# Patient Record
Sex: FEMALE | Race: WHITE | NOT HISPANIC OR LATINO | Employment: OTHER | URBAN - METROPOLITAN AREA
[De-identification: names, ages, dates, MRNs, and addresses within clinical notes are randomized per-mention and may not be internally consistent; named-entity substitution may affect disease eponyms.]

---

## 2017-09-11 ENCOUNTER — OFFICE VISIT (OUTPATIENT)
Dept: HEMATOLOGY/ONCOLOGY | Facility: CLINIC | Age: 72
End: 2017-09-11
Payer: COMMERCIAL

## 2017-09-11 VITALS
BODY MASS INDEX: 23.46 KG/M2 | DIASTOLIC BLOOD PRESSURE: 68 MMHG | HEIGHT: 66 IN | RESPIRATION RATE: 18 BRPM | SYSTOLIC BLOOD PRESSURE: 158 MMHG | TEMPERATURE: 98 F | WEIGHT: 146 LBS | HEART RATE: 74 BPM

## 2017-09-11 DIAGNOSIS — I10 ESSENTIAL HYPERTENSION: ICD-10-CM

## 2017-09-11 DIAGNOSIS — E78.00 HIGH CHOLESTEROL: ICD-10-CM

## 2017-09-11 DIAGNOSIS — G20.A1 PARKINSON'S DISEASE: Primary | ICD-10-CM

## 2017-09-11 DIAGNOSIS — G20.A1 PARKINSON DISEASE: ICD-10-CM

## 2017-09-11 DIAGNOSIS — E83.110 HEREDITARY HEMOCHROMATOSIS: ICD-10-CM

## 2017-09-11 PROBLEM — E83.119 HEMOCHROMATOSIS: Status: ACTIVE | Noted: 2017-09-11

## 2017-09-11 PROCEDURE — 1159F MED LIST DOCD IN RCRD: CPT | Mod: ,,, | Performed by: INTERNAL MEDICINE

## 2017-09-11 PROCEDURE — 1126F AMNT PAIN NOTED NONE PRSNT: CPT | Mod: ,,, | Performed by: INTERNAL MEDICINE

## 2017-09-11 PROCEDURE — 99214 OFFICE O/P EST MOD 30 MIN: CPT | Mod: ,,, | Performed by: INTERNAL MEDICINE

## 2017-09-11 PROCEDURE — 3008F BODY MASS INDEX DOCD: CPT | Mod: ,,, | Performed by: INTERNAL MEDICINE

## 2017-09-11 RX ORDER — BIMATOPROST 0.3 MG/ML
1 SOLUTION/ DROPS OPHTHALMIC NIGHTLY
COMMUNITY
End: 2020-11-02

## 2017-09-11 RX ORDER — PRAVASTATIN SODIUM 10 MG/1
10 TABLET ORAL DAILY
COMMUNITY
End: 2019-09-16

## 2017-09-11 RX ORDER — LITHIUM CARBONATE 150 MG/1
150 CAPSULE ORAL 2 TIMES DAILY
COMMUNITY
End: 2022-04-20 | Stop reason: SDUPTHER

## 2017-09-11 RX ORDER — AA/PROT/LYSINE/METHIO/VIT C/B6 50-12.5 MG
10 TABLET ORAL DAILY
COMMUNITY
End: 2021-05-03

## 2017-09-11 RX ORDER — CARBIDOPA AND LEVODOPA 10; 100 MG/1; MG/1
1 TABLET ORAL 2 TIMES DAILY
Qty: 60 TABLET | Refills: 2
Start: 2017-09-11 | End: 2020-11-02

## 2017-09-11 RX ORDER — CARVEDILOL 12.5 MG/1
12.5 TABLET ORAL 2 TIMES DAILY WITH MEALS
COMMUNITY
End: 2017-09-11 | Stop reason: CLARIF

## 2017-09-11 RX ORDER — FLUOXETINE 10 MG/1
10 CAPSULE ORAL DAILY
COMMUNITY
End: 2020-04-17 | Stop reason: SDUPTHER

## 2017-09-11 RX ORDER — LISINOPRIL 5 MG/1
5 TABLET ORAL DAILY
COMMUNITY
End: 2020-01-08

## 2017-09-11 NOTE — LETTER
September 11, 2017      Max Landeros MD  [address]  [address]  [facility] [address]           [facility]  [address] [facility] [address]  [address]  [facility] [address]  Phone: [phone]  Fax: [phone]          Patient: Payton Gutierrez   MR Number: [id number]   YOB: 1945   Date of Visit: 9/11/2017       Dear Dr. Max Landeros:    Thank you for referring Payton Gutierrez to me for evaluation. Attached you will find relevant portions of my assessment and plan of care.    If you have questions, please do not hesitate to call me. I look forward to following Payton Gutierrez along with you.    Sincerely,    BRANDON Nava MD    Enclosure  CC:  No Recipients    If you would like to receive this communication electronically, please contact [email or web address] or [phone] to request more information on [facility] access.    For providers and/or their staff who would like to refer a patient to [facility], please contact us through our one-stop-shop provider referral line, [facility], at [phone].    If you feel you have received this communication in error or would no longer like to receive these types of communications, please e-mail [email or web address]

## 2017-09-11 NOTE — PROGRESS NOTES
[facility] History & Physical    Subjective:      Patient ID:   Payton Gutierrez  71 y.o. female  1945  [place]      Chief Complaint:   Iron discorder follow up    HPI:  71 y.o. female with diagnosis of increased arm stores, hemochromatosis treated with intermittent phlebotomy in the past. Last ferritin was 31, current ferritin is 52, phlebotomy is on hold.    She has history of GERD, glaucoma, hypercholesterolemia,hypertension, and has been diagnosed with Parkinson's disease.She sees Dr. Spencer.    She is status post tonsillectomy, appendectomy, complete hysterectomy.    She is a retired , she is allergic to codeine. She does not smoke, she does not drink alcohol with regularity.    Her dad passed away of a motor vehicle accident and her mother  of vaginal cancer. She has a brother with the hemochromatosis gene and increased ferritin on intermittent phlebotomy.     She denies gallbladder disease jaundice or hepatitis.      ROS:   GEN: normal without any fever, night sweats or weight loss  HEENT: normal with no HA's, sore throat, stiff neck, changes in vision  CV: normal with no CP, SOB, PND, SAUNDERS or orthopnea  PULM: normal with no SOB, cough, hemoptysis, sputum or pleuritic pain  GI: normal with no abdominal pain, nausea, vomiting, constipation, diarrhea, melanotic stools, BRBPR, or hematemesis  : normal with no hematuria, dysuria  BREAST: normal with no mass, discharge, pain  SKIN: normal with no rash, erythema, bruising, or swelling     Past Medical History:   Diagnosis Date    Glaucoma      Past Surgical History:   Procedure Laterality Date    CATARACT EXTRACTION  2016    HYSTERECTOMY         Review of patient's allergies indicates:   Allergen Reactions    Codeine     Sulfa (sulfonamide antibiotics)      Social History     Social History    Marital status: Single     Spouse name: N/A    Number of children: N/A    Years of education: N/A     Occupational History    Not on file.     Social  "History Main Topics    Smoking status: Never Smoker    Smokeless tobacco: Never Used    Alcohol use No    Drug use: No    Sexual activity: Not on file     Other Topics Concern    Not on file     Social History Narrative    No narrative on file         Current Outpatient Prescriptions:     bimatoprost (LUMIGAN) 0.03 % ophthalmic drops, 1 drop every evening., Disp: , Rfl:     coenzyme Q10 (CO Q-10) 10 mg capsule, Take 10 mg by mouth once daily., Disp: , Rfl:     fluoxetine (PROZAC) 10 MG capsule, Take 10 mg by mouth once daily., Disp: , Rfl:     lisinopril (PRINIVIL,ZESTRIL) 5 MG tablet, Take 5 mg by mouth once daily., Disp: , Rfl:     lithium carbonate 150 MG capsule, Take 150 mg by mouth 3 (three) times daily with meals., Disp: , Rfl:     pravastatin (PRAVACHOL) 10 MG tablet, Take 10 mg by mouth once daily., Disp: , Rfl:     carbidopa-levodopa  mg (SINEMET)  mg per tablet, Take 1 tablet by mouth 2 (two) times daily. Pt not sure of pill strength, bring in bottle next time, Disp: 60 tablet, Rfl: 2    travoprost, benzalkonium, (TRAVATAN) 0.004 % ophthalmic solution, 1 drop every evening.  , Disp: , Rfl:           Objective:   Vitals:  Blood pressure (!) 158/68, pulse 74, temperature 98.2 °F (36.8 °C), resp. rate 18, height 5' 6" (1.676 m), weight 66.2 kg (146 lb).    Physical Examination:   GEN: no apparent distress, comfortable  HEAD: atraumatic and normocephalic  EYES: no pallor, no icterus  ENT: OMM, no pharyngeal erythema, external ears WNL; no nasal discharge; no thrush  NECK: no masses, thyroid normal, trachea midline, no LAD/LN's, supple  CV: RRR with no murmur; normal pulse; normal S1 and S2; no pedal edema  CHEST: Normal respiratory effort; CTAB; normal breath sounds; no wheeze or crackles  ABDOM: nontender and nondistended; soft; normal bowel sounds; no rebound/guarding, the liver and spleen are not palpable  MUSC/Skeletal: ROM normal; no crepitus; joints normal  EXTREM: no " clubbing, cyanosis, inflammation or swelling  SKIN: no rashes, lesions, ulcers, petechia  : no maradiaga  NEURO: grossly intact; motor/sensory WNL;  no tremors  PSYCH: normal mood, affect and behavior  LYMPH: normal cervical, supraclavicular, axillary and groin LN's  BREASTS:she left the bra on, I did not examine the breasts    Radiology/Diagnostic Studies:    Lab from 09/05/2017 showed a hemoglobin of 12.9, and a ferritin of 52.    Dr. Jain did colonoscopy on August 17, diverticular disease were found, biopsy was negative for colitis.      Assessment:   (1) 71 y.o. female with diagnosis of increased iron store worse, or hemochromatosis. She is status post intermittent phlebotomy in the past. Ferritin level is normal at 52 and relatively stable. Observation is the plan. She will have follow-up CBC, and ferritin and return to clinic in 6 months time    (2)other history includes hypertension, hypercholesterolemia, she will follow-up with Dr. Landeros for primary care. She has recently been diagnosed with Parkinson's disease and will follow-up with Dr. Spencer for that condition.    Return to clinic here in 6 months with CBC and ferritin.          1. Parkinson's disease    2. Parkinson disease    3. Essential hypertension    4. High cholesterol    5. Hereditary hemochromatosis        Plan:     Return in about 6 months (around 3/11/2018) for check of blood status after therapy.

## 2018-02-26 LAB
BASOPHILS ABSOLUTE COUNT: 0.1 K/UL (ref 0–0.2)
BASOPHILS NFR BLD: 1.3 % (ref 0–2)
DIFFERENTIAL TYPE: NORMAL
EOSINOPHIL NFR BLD: 3.9 % (ref 0–4)
EOSINOPHILS ABSOLUTE COUNT: 0.2 K/UL (ref 0–0.7)
ERYTHROCYTE [DISTWIDTH] IN BLOOD BY AUTOMATED COUNT: 14 GRAM/DL (ref 12–15.3)
FERRITIN SERPL-MCNC: 99.2 NANOGRAM/ML (ref 13–150)
HCT VFR BLD AUTO: 40.9 % (ref 37–47)
HGB BLD-MCNC: 14.2 GRAM/DL (ref 12–16)
LYMPHOCYTES %: 31.5 % (ref 15–45)
LYMPHOCYTES ABSOLUTE COUNT: 1.5 K/UL (ref 1–4.2)
MCH RBC QN AUTO: 30.5 PICOGRAM (ref 27–33)
MCHC RBC AUTO-ENTMCNC: 34.7 GRAM/DL (ref 32–36)
MCV RBC AUTO: 88.1 FEMTOLITER (ref 81–99)
MONOCYTES %: 9.2 % (ref 3–13)
MONOCYTES ABSOLUTE COUNT: 0.4 K/UL (ref 0.1–0.8)
NEUTROPHILS ABSOLUTE COUNT: 2.5 K/UL (ref 2.1–7.6)
NEUTROPHILS RELATIVE PERCENT: 54.1 % (ref 32–80)
PLATELET # BLD AUTO: 280 K/UL (ref 150–350)
PMV BLD AUTO: 8.1 FEMTOLITER (ref 7–10.2)
RBC # BLD AUTO: 4.64 MIL/UL (ref 4.2–5.4)
WBC # BLD AUTO: 4.6 K/UL (ref 4.5–11)

## 2018-03-05 ENCOUNTER — OFFICE VISIT (OUTPATIENT)
Dept: HEMATOLOGY/ONCOLOGY | Facility: CLINIC | Age: 73
End: 2018-03-05
Payer: COMMERCIAL

## 2018-03-05 VITALS
WEIGHT: 143 LBS | SYSTOLIC BLOOD PRESSURE: 172 MMHG | RESPIRATION RATE: 18 BRPM | BODY MASS INDEX: 23.08 KG/M2 | DIASTOLIC BLOOD PRESSURE: 74 MMHG | TEMPERATURE: 98 F | HEART RATE: 64 BPM

## 2018-03-05 DIAGNOSIS — E83.110 HEREDITARY HEMOCHROMATOSIS: Primary | ICD-10-CM

## 2018-03-05 PROCEDURE — 3078F DIAST BP <80 MM HG: CPT | Mod: ,,, | Performed by: INTERNAL MEDICINE

## 2018-03-05 PROCEDURE — 99214 OFFICE O/P EST MOD 30 MIN: CPT | Mod: ,,, | Performed by: INTERNAL MEDICINE

## 2018-03-05 PROCEDURE — 3077F SYST BP >= 140 MM HG: CPT | Mod: ,,, | Performed by: INTERNAL MEDICINE

## 2018-03-05 NOTE — LETTER
March 5, 2018      Max Landeros MD  [address]  [address]  [facility] [address]           [facility]  [address] [facility]  [address]  [facility] [address]  Phone: [phone]  Fax: [phone]          Patient: Payton Gutierrez   MR Number: [id number]   YOB: 1945   Date of Visit: 3/5/2018       Dear Dr. Max Landeros:    Thank you for referring Payton Gutierrez to me for evaluation. Attached you will find relevant portions of my assessment and plan of care.    If you have questions, please do not hesitate to call me. I look forward to following Payton Gutierrez along with you.    Sincerely,    BRANDON Nava MD    Enclosure  CC:  No Recipients    If you would like to receive this communication electronically, please contact [email or web address] or [phone] to request more information on [facility] access.    For providers and/or their staff who would like to refer a patient to [facility], please contact us through our one-stop-shop provider referral line, [facility], at [phone].    If you feel you have received this communication in error or would no longer like to receive these types of communications, please e-mail [email or web address]

## 2018-03-05 NOTE — PROGRESS NOTES
[facility] History & Physical    Subjective:      Patient ID:   Payton Gutierrez  72 y.o. female  1945  [place]      Chief Complaint:   Iron discorder follow up    HPI:  72 y.o. female with diagnosis of increased iron stores, hemochromatosis treated with intermittent phlebotomy in the past. Ferritin is 99, Hgb 14 now, phlebotomy is on hold.    She has history of GERD, glaucoma, hypercholesterolemia,hypertension, and has been diagnosed with Parkinson's disease.  She sees Dr. Spencer.    She is status post tonsillectomy, appendectomy, complete hysterectomy.    She is a retired , she is allergic to codeine. She does not smoke, she does not drink alcohol with regularity.    Her dad passed away of a motor vehicle accident and her mother  of vaginal cancer. She has a brother with the hemochromatosis gene and increased ferritin on intermittent phlebotomy.    She denies gallbladder disease jaundice or hepatitis.  She is on lithium.  She is seeing Dr. Watts for renal dx.  She recently had diverticulitis.    2nd cousin Adry STARKS.    ROS:   GEN: normal without any fever, night sweats or weight loss  HEENT: normal with no HA's, sore throat, stiff neck, changes in vision  CV: normal with no CP, SOB, PND, SAUNDERS or orthopnea  PULM: normal with no SOB, cough, hemoptysis, sputum or pleuritic pain  GI: normal with no abdominal pain, nausea, vomiting, constipation, diarrhea, melanotic stools, BRBPR, or hematemesis  : normal with no hematuria, dysuria  BREAST: normal with no mass, discharge, pain  SKIN: normal with no rash, erythema, bruising, or swelling     Past Medical History:   Diagnosis Date    Glaucoma      Past Surgical History:   Procedure Laterality Date    CATARACT EXTRACTION  2016    HYSTERECTOMY         Review of patient's allergies indicates:   Allergen Reactions    Codeine     Sulfa (sulfonamide antibiotics)      Social History     Social History    Marital status: Single     Spouse name: N/A     Number of children: N/A    Years of education: N/A     Occupational History    Not on file.     Social History Main Topics    Smoking status: Never Smoker    Smokeless tobacco: Never Used    Alcohol use No    Drug use: No    Sexual activity: Not on file     Other Topics Concern    Not on file     Social History Narrative    No narrative on file         Current Outpatient Prescriptions:     bimatoprost (LUMIGAN) 0.03 % ophthalmic drops, 1 drop every evening., Disp: , Rfl:     carbidopa-levodopa  mg (SINEMET)  mg per tablet, Take 1 tablet by mouth 2 (two) times daily. Pt not sure of pill strength, bring in bottle next time, Disp: 60 tablet, Rfl: 2    coenzyme Q10 (CO Q-10) 10 mg capsule, Take 10 mg by mouth once daily., Disp: , Rfl:     fluoxetine (PROZAC) 10 MG capsule, Take 10 mg by mouth once daily., Disp: , Rfl:     lisinopril (PRINIVIL,ZESTRIL) 5 MG tablet, Take 5 mg by mouth once daily., Disp: , Rfl:     lithium carbonate 150 MG capsule, Take 150 mg by mouth 3 (three) times daily with meals., Disp: , Rfl:     pravastatin (PRAVACHOL) 10 MG tablet, Take 10 mg by mouth once daily., Disp: , Rfl:     travoprost, benzalkonium, (TRAVATAN) 0.004 % ophthalmic solution, 1 drop every evening.  , Disp: , Rfl:           Objective:   Vitals:  Blood pressure (!) 172/74, pulse 64, temperature 97.9 °F (36.6 °C), resp. rate 18, weight 64.9 kg (143 lb).    Physical Examination:   GEN: no apparent distress, comfortable  HEAD: atraumatic and normocephalic  EYES: no pallor, no icterus  ENT: OMM, no pharyngeal erythema, external ears WNL; no nasal discharge; no thrush  NECK: no masses, thyroid normal, trachea midline, no LAD/LN's, supple  CV: RRR with no murmur; normal pulse; normal S1 and S2; no pedal edema  CHEST: Normal respiratory effort; CTAB; normal breath sounds; no wheeze or crackles  ABDOM: nontender and nondistended; soft; normal bowel sounds; no rebound/guarding, the liver and spleen are not  palpable  MUSC/Skeletal: ROM normal; no crepitus; joints normal  EXTREM: no clubbing, cyanosis, inflammation or swelling  SKIN: no rashes, lesions, ulcers, petechia  : no maradiaga  NEURO: grossly intact; motor/sensory WNL;  no tremors  PSYCH: normal mood, affect and behavior  LYMPH: normal cervical, supraclavicular, axillary and groin LN's  BREASTS:she left the bra on, I did not examine the breasts    Radiology/Diagnostic Studies:    Ferritin 99, Hgb 14.    Dr. Jain did colonoscopy on August 17, diverticular disease were found, biopsy was negative for colitis.      Assessment:   (1) 72 y.o. female with diagnosis of increased iron stores, or hemochromatosis. She is status post intermittent phlebotomy in the past. Ferritin level is normal at 80-90  and relatively stable. Observation is the plan. She will have follow-up CBC, and ferritin and return to clinic in 6 months time.    (2)other history includes hypertension, hypercholesterolemia, she will follow-up with Dr. Landeros for primary care. She has recently been diagnosed with Parkinson's disease and will follow-up with Dr. Spencer for that condition and Dr. Chapa for renal.    Return to clinic here in 6 months with CBC and ferritin.

## 2018-09-04 LAB
BASOPHILS NFR BLD: 0.1 K/UL (ref 0–0.2)
BASOPHILS NFR BLD: 1 %
EOSINOPHIL NFR BLD: 0.3 K/UL (ref 0–0.7)
EOSINOPHIL NFR BLD: 4.4 %
ERYTHROCYTE [DISTWIDTH] IN BLOOD BY AUTOMATED COUNT: 12.7 % (ref 11.7–14.9)
FERRITIN SERPL-MCNC: 85 NG/ML (ref 24–162)
GRAN #: 3.3 K/UL (ref 1.4–6.5)
GRAN%: 54.1 %
HCT VFR BLD AUTO: 41.1 % (ref 36–48)
HGB BLD-MCNC: 13.8 G/DL (ref 12–15)
IMMATURE GRANS (ABS): 0 K/UL (ref 0–1)
IMMATURE GRANULOCYTES: 0.3 %
LYMPH #: 1.9 K/UL (ref 1.2–3.4)
LYMPH%: 30.4 %
MCH RBC QN AUTO: 31.4 PG (ref 25–35)
MCHC RBC AUTO-ENTMCNC: 33.6 G/DL (ref 31–36)
MCV RBC AUTO: 93.6 FL (ref 79–98)
MONO #: 0.6 K/UL (ref 0.1–0.6)
MONO%: 9.8 %
NUCLEATED RBCS: 0 %
PLATELET # BLD AUTO: 235 K/UL (ref 140–440)
PMV BLD AUTO: 10.4 FL (ref 8.8–12.7)
RBC # BLD AUTO: 4.39 M/UL (ref 3.5–5.5)
WBC # BLD AUTO: 6.1 K/UL (ref 5–10)

## 2018-09-10 ENCOUNTER — OFFICE VISIT (OUTPATIENT)
Dept: HEMATOLOGY/ONCOLOGY | Facility: CLINIC | Age: 73
End: 2018-09-10
Payer: COMMERCIAL

## 2018-09-10 VITALS
SYSTOLIC BLOOD PRESSURE: 168 MMHG | TEMPERATURE: 98 F | BODY MASS INDEX: 24.41 KG/M2 | WEIGHT: 151.88 LBS | HEIGHT: 66 IN | HEART RATE: 73 BPM | DIASTOLIC BLOOD PRESSURE: 76 MMHG

## 2018-09-10 DIAGNOSIS — E83.110 HEREDITARY HEMOCHROMATOSIS: Primary | ICD-10-CM

## 2018-09-10 PROCEDURE — 1101F PT FALLS ASSESS-DOCD LE1/YR: CPT | Mod: ,,, | Performed by: INTERNAL MEDICINE

## 2018-09-10 PROCEDURE — 3077F SYST BP >= 140 MM HG: CPT | Mod: ,,, | Performed by: INTERNAL MEDICINE

## 2018-09-10 PROCEDURE — 99214 OFFICE O/P EST MOD 30 MIN: CPT | Mod: ,,, | Performed by: INTERNAL MEDICINE

## 2018-09-10 PROCEDURE — 3078F DIAST BP <80 MM HG: CPT | Mod: ,,, | Performed by: INTERNAL MEDICINE

## 2018-09-10 NOTE — LETTER
September 10, 2018      Max Landeros MD  [address]  [address]  [facility]  [place] [address]           [facility] - Hematology Oncology  [address] [facility] [address]  [address]  [place] [address]  Phone: [phone]  Fax: [phone]          Patient: Payton Gutierrez   MR Number: [id number]   YOB: 1945   Date of Visit: 9/10/2018       Dear Dr. Max Landeros:    Thank you for referring Payton Gutierrez to me for evaluation. Attached you will find relevant portions of my assessment and plan of care.    If you have questions, please do not hesitate to call me. I look forward to following Payton Gutierrez along with you.    Sincerely,    BRANDON Nava MD    Enclosure  CC:  No Recipients    If you would like to receive this communication electronically, please contact [email or web address] or [phone] to request more information on [facility] access.    For providers and/or their staff who would like to refer a patient to [facility], please contact us through our one-stop-shop provider referral line, [facility] [place], at [phone].    If you feel you have received this communication in error or would no longer like to receive these types of communications, please e-mail [email or web address]

## 2018-09-10 NOTE — PROGRESS NOTES
[facility] History & Physical    Subjective:      Patient ID:   Payton Gutierrez  72 y.o. female  1945  [place]      Chief Complaint:   Iron discorder follow up    HPI:  72 y.o. female with diagnosis of increased iron stores, hemochromatosis treated with intermittent phlebotomy in the past. Ferritin is 99, Hgb 14 now, phlebotomy is on hold. Her last therapeutic phlebotomy was 1-1/2 year ago.    She has history of GERD, glaucoma, hypercholesterolemia,hypertension, and has been diagnosed with Parkinson's disease.  She sees Dr. Spencer.    She is status post tonsillectomy, appendectomy, complete hysterectomy.    She is a retired , she is allergic to codeine. She does not smoke, she does not drink alcohol with regularity.    Her dad passed away of a motor vehicle accident and her mother  of vaginal cancer. She has a brother with the hemochromatosis gene and increased ferritin on intermittent phlebotomy.    She denies gallbladder disease jaundice or hepatitis.  She is on lithium.  She is seeing Dr. Watts for renal dx.  She recently had diverticulitis.    2nd cousin Adry STARKS.    ROS:   GEN: normal without any fever, night sweats or weight loss  HEENT: normal with no HA's, sore throat, stiff neck, changes in vision  CV: normal with no CP, SOB, PND, SAUNDERS or orthopnea  PULM: normal with no SOB, cough, hemoptysis, sputum or pleuritic pain  GI: normal with no abdominal pain, nausea, vomiting, constipation, diarrhea, melanotic stools, BRBPR, or hematemesis  : normal with no hematuria, dysuria  BREAST: normal with no mass, discharge, pain  SKIN: normal with no rash, erythema, bruising, or swelling     Past Medical History:   Diagnosis Date    Glaucoma      Past Surgical History:   Procedure Laterality Date    CATARACT EXTRACTION  2016    HYSTERECTOMY         Review of patient's allergies indicates:   Allergen Reactions    Codeine     Sulfa (sulfonamide antibiotics)      Social History     Socioeconomic  "History    Marital status: Single     Spouse name: Not on file    Number of children: Not on file    Years of education: Not on file    Highest education level: Not on file   Social Needs    Financial resource strain: Not on file    Food insecurity - worry: Not on file    Food insecurity - inability: Not on file    Transportation needs - medical: Not on file    Transportation needs - non-medical: Not on file   Occupational History    Not on file   Tobacco Use    Smoking status: Never Smoker    Smokeless tobacco: Never Used   Substance and Sexual Activity    Alcohol use: No    Drug use: No    Sexual activity: Not on file   Other Topics Concern    Not on file   Social History Narrative    Not on file         Current Outpatient Medications:     bimatoprost (LUMIGAN) 0.03 % ophthalmic drops, 1 drop every evening., Disp: , Rfl:     carbidopa-levodopa  mg (SINEMET)  mg per tablet, Take 1 tablet by mouth 2 (two) times daily. Pt not sure of pill strength, bring in bottle next time, Disp: 60 tablet, Rfl: 2    coenzyme Q10 (CO Q-10) 10 mg capsule, Take 10 mg by mouth once daily., Disp: , Rfl:     fluoxetine (PROZAC) 10 MG capsule, Take 10 mg by mouth once daily., Disp: , Rfl:     lisinopril (PRINIVIL,ZESTRIL) 5 MG tablet, Take 5 mg by mouth once daily., Disp: , Rfl:     lithium carbonate 150 MG capsule, Take 150 mg by mouth 3 (three) times daily with meals., Disp: , Rfl:     pravastatin (PRAVACHOL) 10 MG tablet, Take 10 mg by mouth once daily., Disp: , Rfl:     travoprost, benzalkonium, (TRAVATAN) 0.004 % ophthalmic solution, 1 drop every evening.  , Disp: , Rfl:           Objective:   Vitals:  Blood pressure (!) 168/76, pulse 73, temperature 97.5 °F (36.4 °C), height 5' 6" (1.676 m), weight 68.9 kg (151 lb 14.4 oz).    Physical Examination:   GEN: no apparent distress, comfortable  HEAD: atraumatic and normocephalic  EYES: no pallor, no icterus  ENT: OMM, no pharyngeal erythema, external " ears WNL; no nasal discharge; no thrush  NECK: no masses, thyroid normal, trachea midline, no LAD/LN's, supple  CV: RRR with no murmur; normal pulse; normal S1 and S2; no pedal edema  CHEST: Normal respiratory effort; CTAB; normal breath sounds; no wheeze or crackles  ABDOM: nontender and nondistended; soft; normal bowel sounds; no rebound/guarding, the liver and spleen are not palpable  MUSC/Skeletal: ROM normal; no crepitus; joints normal  EXTREM: no clubbing, cyanosis, inflammation or swelling  SKIN: no rashes, lesions, ulcers, petechia  : no maradiaga  NEURO: grossly intact; motor/sensory WNL;  no tremors  PSYCH: normal mood, affect and behavior  LYMPH: normal cervical, supraclavicular, axillary and groin LN's  BREASTS:she left the bra on, I did not examine the breasts    Radiology/Diagnostic Studies:    Ferritin 85, Hgb 14.    Dr. Jain did colonoscopy on August 17, 2017, diverticular disease were found, biopsy was negative for colitis.      Assessment:   (1) 72 y.o. female with diagnosis of increased iron stores, or hemochromatosis. She is status post intermittent phlebotomy in the past. Ferritin level is normal at 80-90  and relatively stable. Observation is the plan. She will have follow-up CBC, and ferritin and return to clinic in 6 months time.    (2)other history includes hypertension, hypercholesterolemia, she will follow-up with Dr. Landeros for primary care. She has recently been diagnosed with Parkinson's disease and will follow-up with Dr. Spencer for that condition and Dr. Chapa for renal.    Return to clinic here in 6 months with CBC and ferritin.

## 2019-03-04 LAB
BASOPHILS NFR BLD: 0.1 K/UL (ref 0–0.2)
BASOPHILS NFR BLD: 1.2 %
EOSINOPHIL NFR BLD: 0.3 K/UL (ref 0–0.7)
EOSINOPHIL NFR BLD: 4.8 %
ERYTHROCYTE [DISTWIDTH] IN BLOOD BY AUTOMATED COUNT: 12.8 % (ref 11.7–14.9)
FERRITIN SERPL-MCNC: 107 NG/ML (ref 24–162)
GRAN #: 3.2 K/UL (ref 1.4–6.5)
GRAN%: 57 %
HCT VFR BLD AUTO: 42 % (ref 36–48)
HGB BLD-MCNC: 13.7 G/DL (ref 12–15)
IMMATURE GRANS (ABS): 0 K/UL (ref 0–1)
IMMATURE GRANULOCYTES: 0.2 %
LYMPH #: 1.6 K/UL (ref 1.2–3.4)
LYMPH%: 27.6 %
MCH RBC QN AUTO: 30.2 PG (ref 25–35)
MCHC RBC AUTO-ENTMCNC: 32.6 G/DL (ref 31–36)
MCV RBC AUTO: 92.5 FL (ref 79–98)
MONO #: 0.5 K/UL (ref 0.1–0.6)
MONO%: 9.2 %
NUCLEATED RBCS: 0 %
PLATELET # BLD AUTO: 263 K/UL (ref 140–440)
PMV BLD AUTO: 10.2 FL (ref 8.8–12.7)
RBC # BLD AUTO: 4.54 M/UL (ref 3.5–5.5)
WBC # BLD AUTO: 5.7 K/UL (ref 5–10)

## 2019-03-11 ENCOUNTER — OFFICE VISIT (OUTPATIENT)
Dept: HEMATOLOGY/ONCOLOGY | Facility: CLINIC | Age: 74
End: 2019-03-11
Payer: COMMERCIAL

## 2019-03-11 DIAGNOSIS — E83.119 HEMOCHROMATOSIS, UNSPECIFIED HEMOCHROMATOSIS TYPE: Primary | ICD-10-CM

## 2019-03-11 PROCEDURE — 99214 PR OFFICE/OUTPT VISIT, EST, LEVL IV, 30-39 MIN: ICD-10-PCS | Mod: ,,, | Performed by: INTERNAL MEDICINE

## 2019-03-11 PROCEDURE — 99214 OFFICE O/P EST MOD 30 MIN: CPT | Mod: ,,, | Performed by: INTERNAL MEDICINE

## 2019-03-12 NOTE — PROGRESS NOTES
[facility] History & Physical    Subjective:      Patient ID:   Payton Gutierrez  73 y.o. female  1945  [place]      Chief Complaint:   Iron discorder follow up    HPI:  73 y.o. female with diagnosis of increased iron stores, hemochromatosis treated with intermittent phlebotomy in the past. Ferritin is 107 , Hgb 14 now, phlebotomy is on hold. Her last therapeutic phlebotomy was 2 years  ago.    She has history of GERD, glaucoma, hypercholesterolemia,hypertension, and has been diagnosed with Parkinson's disease.  She sees Dr. Spencer.    She is status post tonsillectomy, appendectomy, complete hysterectomy.    She is a retired , she is allergic to codeine. She does not smoke, she does not drink alcohol with regularity.    Her dad passed away of a motor vehicle accident and her mother  of vaginal cancer. She has a brother with the hemochromatosis gene and increased ferritin on intermittent phlebotomy.    She denies gallbladder disease jaundice or hepatitis.  She is on lithium.  She is seeing Dr. Watts for renal dx.  She recently had diverticulitis.    2nd cousin Adry STARKS.    ROS:   GEN: normal without any fever, night sweats or weight loss  HEENT: normal with no HA's, sore throat, stiff neck, changes in vision  CV: normal with no CP, SOB, PND, SAUNDERS or orthopnea  PULM: normal with no SOB, cough, hemoptysis, sputum or pleuritic pain  GI: normal with no abdominal pain, nausea, vomiting, constipation, diarrhea, melanotic stools, BRBPR, or hematemesis  : normal with no hematuria, dysuria  BREAST: normal with no mass, discharge, pain  SKIN: normal with no rash, erythema, bruising, or swelling     Past Medical History:   Diagnosis Date    Glaucoma      Past Surgical History:   Procedure Laterality Date    CATARACT EXTRACTION  2016    HYSTERECTOMY         Review of patient's allergies indicates:   Allergen Reactions    Codeine     Sulfa (sulfonamide antibiotics)      Social History     Socioeconomic  History    Marital status: Single     Spouse name: Not on file    Number of children: Not on file    Years of education: Not on file    Highest education level: Not on file   Social Needs    Financial resource strain: Not on file    Food insecurity - worry: Not on file    Food insecurity - inability: Not on file    Transportation needs - medical: Not on file    Transportation needs - non-medical: Not on file   Occupational History    Not on file   Tobacco Use    Smoking status: Never Smoker    Smokeless tobacco: Never Used   Substance and Sexual Activity    Alcohol use: No    Drug use: No    Sexual activity: Not on file   Other Topics Concern    Not on file   Social History Narrative    Not on file         Current Outpatient Medications:     bimatoprost (LUMIGAN) 0.03 % ophthalmic drops, 1 drop every evening., Disp: , Rfl:     carbidopa-levodopa  mg (SINEMET)  mg per tablet, Take 1 tablet by mouth 2 (two) times daily. Pt not sure of pill strength, bring in bottle next time, Disp: 60 tablet, Rfl: 2    coenzyme Q10 (CO Q-10) 10 mg capsule, Take 10 mg by mouth once daily., Disp: , Rfl:     fluoxetine (PROZAC) 10 MG capsule, Take 10 mg by mouth once daily., Disp: , Rfl:     lisinopril (PRINIVIL,ZESTRIL) 5 MG tablet, Take 5 mg by mouth once daily., Disp: , Rfl:     lithium carbonate 150 MG capsule, Take 150 mg by mouth 3 (three) times daily with meals., Disp: , Rfl:     pravastatin (PRAVACHOL) 10 MG tablet, Take 10 mg by mouth once daily., Disp: , Rfl:     travoprost, benzalkonium, (TRAVATAN) 0.004 % ophthalmic solution, 1 drop every evening.  , Disp: , Rfl:           Objective:   Vitals:  There were no vitals taken for this visit.    Physical Examination:   GEN: no apparent distress, comfortable  HEAD: atraumatic and normocephalic  EYES: no pallor, no icterus  ENT: OMM, no pharyngeal erythema, external ears WNL; no nasal discharge; no thrush  NECK: no masses, thyroid normal, trachea  midline, no LAD/LN's, supple  CV: RRR with no murmur; normal pulse; normal S1 and S2; no pedal edema  CHEST: Normal respiratory effort; CTAB; normal breath sounds; no wheeze or crackles  ABDOM: nontender and nondistended; soft;  no rebound/guarding, the liver and spleen are not palpable  MUSC/Skeletal: ROM normal; no crepitus; joints normal  EXTREM: no clubbing, cyanosis, inflammation or swelling  SKIN: no rashes, lesions, ulcers, petechia  : no cvat  NEURO: grossly intact; motor/sensory WNL;  no tremors  PSYCH: normal mood, affect and behavior  LYMPH: normal cervical, supraclavicular, axillary and groin LN's  BREASTS:she left the bra on, I did not examine the breasts    Radiology/Diagnostic Studies:    Ferritin 107, Hgb 14.    Dr. Jain did colonoscopy on August 17, 2017, diverticular disease were found, biopsy was negative for colitis.      Assessment:   (1) 73 y.o. female with diagnosis of increased iron stores, or hemochromatosis. She is status post intermittent phlebotomy in the past. Ferritin level is normal at 107  and relatively stable. Observation is the plan. She will have follow-up CBC, and ferritin and return to clinic in 6 months time.    (2)other history includes hypertension, hypercholesterolemia, she will follow-up with Dr. Landeros for primary care. She has recently been diagnosed with Parkinson's disease and will follow-up with Dr. Spencer for that condition and Dr. Chapa for renal.    Return to clinic here in 6 months with CBC and ferritin.

## 2019-09-10 ENCOUNTER — LAB VISIT (OUTPATIENT)
Dept: LAB | Facility: HOSPITAL | Age: 74
End: 2019-09-10
Attending: INTERNAL MEDICINE
Payer: COMMERCIAL

## 2019-09-10 DIAGNOSIS — E83.119 DILATED CARDIOMYOPATHY SECONDARY TO HEMOCHROMATOSIS: Primary | ICD-10-CM

## 2019-09-10 DIAGNOSIS — I43 DILATED CARDIOMYOPATHY SECONDARY TO HEMOCHROMATOSIS: Primary | ICD-10-CM

## 2019-09-10 LAB
BASOPHILS # BLD AUTO: 0.07 K/UL (ref 0–0.2)
BASOPHILS NFR BLD: 1.5 % (ref 0–1.9)
DIFFERENTIAL METHOD: NORMAL
EOSINOPHIL # BLD AUTO: 0.2 K/UL (ref 0–0.5)
EOSINOPHIL NFR BLD: 4.2 % (ref 0–8)
ERYTHROCYTE [DISTWIDTH] IN BLOOD BY AUTOMATED COUNT: 12.8 % (ref 11.5–14.5)
FERRITIN SERPL-MCNC: 117 NG/ML (ref 20–300)
HCT VFR BLD AUTO: 40.1 % (ref 37–48.5)
HGB BLD-MCNC: 13.2 G/DL (ref 12–16)
IMM GRANULOCYTES # BLD AUTO: 0.01 K/UL (ref 0–0.04)
IMM GRANULOCYTES NFR BLD AUTO: 0.2 % (ref 0–0.5)
LYMPHOCYTES # BLD AUTO: 1.3 K/UL (ref 1–4.8)
LYMPHOCYTES NFR BLD: 28.9 % (ref 18–48)
MCH RBC QN AUTO: 30.3 PG (ref 27–31)
MCHC RBC AUTO-ENTMCNC: 32.9 G/DL (ref 32–36)
MCV RBC AUTO: 92 FL (ref 82–98)
MONOCYTES # BLD AUTO: 0.4 K/UL (ref 0.3–1)
MONOCYTES NFR BLD: 8.6 % (ref 4–15)
NEUTROPHILS # BLD AUTO: 2.6 K/UL (ref 1.8–7.7)
NEUTROPHILS NFR BLD: 56.6 % (ref 38–73)
NRBC BLD-RTO: 0 /100 WBC
PLATELET # BLD AUTO: 221 K/UL (ref 150–350)
PMV BLD AUTO: 10.5 FL (ref 9.2–12.9)
RBC # BLD AUTO: 4.36 M/UL (ref 4–5.4)
WBC # BLD AUTO: 4.53 K/UL (ref 3.9–12.7)

## 2019-09-10 PROCEDURE — 82728 ASSAY OF FERRITIN: CPT

## 2019-09-10 PROCEDURE — 36415 COLL VENOUS BLD VENIPUNCTURE: CPT

## 2019-09-10 PROCEDURE — 85025 COMPLETE CBC W/AUTO DIFF WBC: CPT

## 2019-09-16 ENCOUNTER — OFFICE VISIT (OUTPATIENT)
Dept: HEMATOLOGY/ONCOLOGY | Facility: CLINIC | Age: 74
End: 2019-09-16
Payer: COMMERCIAL

## 2019-09-16 VITALS
HEART RATE: 65 BPM | DIASTOLIC BLOOD PRESSURE: 70 MMHG | WEIGHT: 154.38 LBS | BODY MASS INDEX: 24.92 KG/M2 | TEMPERATURE: 98 F | RESPIRATION RATE: 20 BRPM | SYSTOLIC BLOOD PRESSURE: 155 MMHG

## 2019-09-16 DIAGNOSIS — E83.119 HEMOCHROMATOSIS, UNSPECIFIED HEMOCHROMATOSIS TYPE: Primary | ICD-10-CM

## 2019-09-16 PROCEDURE — 1101F PT FALLS ASSESS-DOCD LE1/YR: CPT | Mod: S$GLB,,, | Performed by: INTERNAL MEDICINE

## 2019-09-16 PROCEDURE — 1101F PR PT FALLS ASSESS DOC 0-1 FALLS W/OUT INJ PAST YR: ICD-10-PCS | Mod: S$GLB,,, | Performed by: INTERNAL MEDICINE

## 2019-09-16 PROCEDURE — 3078F DIAST BP <80 MM HG: CPT | Mod: S$GLB,,, | Performed by: INTERNAL MEDICINE

## 2019-09-16 PROCEDURE — 3077F SYST BP >= 140 MM HG: CPT | Mod: S$GLB,,, | Performed by: INTERNAL MEDICINE

## 2019-09-16 PROCEDURE — 3077F PR MOST RECENT SYSTOLIC BLOOD PRESSURE >= 140 MM HG: ICD-10-PCS | Mod: S$GLB,,, | Performed by: INTERNAL MEDICINE

## 2019-09-16 PROCEDURE — 3078F PR MOST RECENT DIASTOLIC BLOOD PRESSURE < 80 MM HG: ICD-10-PCS | Mod: S$GLB,,, | Performed by: INTERNAL MEDICINE

## 2019-09-16 PROCEDURE — 99214 PR OFFICE/OUTPT VISIT, EST, LEVL IV, 30-39 MIN: ICD-10-PCS | Mod: S$GLB,,, | Performed by: INTERNAL MEDICINE

## 2019-09-16 PROCEDURE — 99214 OFFICE O/P EST MOD 30 MIN: CPT | Mod: S$GLB,,, | Performed by: INTERNAL MEDICINE

## 2019-09-16 RX ORDER — ROSUVASTATIN CALCIUM 10 MG/1
10 TABLET, COATED ORAL DAILY
COMMUNITY
End: 2020-04-17 | Stop reason: SDUPTHER

## 2019-09-16 NOTE — PROGRESS NOTES
[facility] History & Physical    Subjective:      Patient ID:   Payton Gutierrez  73 y.o. female  1945  [place]      Chief Complaint:   Iron discorder follow up    HPI:  73 y.o. female with diagnosis of increased iron stores, hemochromatosis treated with intermittent phlebotomy in the past. Ferritin is 117 , Hgb 14 now, phlebotomy is on hold. Her last therapeutic phlebotomy   was 2 1/2 years  Ago.    She will have CBC done every 6 months, our plan is to resume phlebotomy when her ferritin is in the 150 range.    She has history of GERD, glaucoma, hypercholesterolemia,hypertension, and has been diagnosed with Parkinson's disease.  She sees Dr. Spencer.  He tells me her Parkinson's symptoms have resolved, and she is off Parkinson medications.    She is status post tonsillectomy, appendectomy, complete hysterectomy.    She is a retired , she is allergic to codeine. She does not smoke, she does not drink alcohol with regularity.    Her dad passed away of a motor vehicle accident and her mother  of vaginal cancer. She has a brother with the hemochromatosis gene and increased ferritin on intermittent phlebotomy.    She denies gallbladder disease jaundice or hepatitis.  She is on lithium.  She is seeing Dr. Watts for renal dx.  She recently had diverticulitis.    2nd cousin Adry STARKS.    ROS:   GEN: normal without any fever, night sweats or weight loss  HEENT: normal with no HA's, sore throat, stiff neck, changes in vision  CV: normal with no CP, SOB, PND, SAUNDERS or orthopnea  PULM: normal with no SOB, cough, hemoptysis, sputum or pleuritic pain  GI: normal with no abdominal pain, nausea, vomiting, constipation, diarrhea, melanotic stools, BRBPR, or hematemesis  : normal with no hematuria, dysuria  BREAST: normal with no mass, discharge, pain  SKIN: normal with no rash, erythema, bruising, or swelling     Past Medical History:   Diagnosis Date    Glaucoma      Past Surgical History:   Procedure Laterality  Date    CATARACT EXTRACTION  2016    HYSTERECTOMY         Review of patient's allergies indicates:   Allergen Reactions    Codeine     Sulfa (sulfonamide antibiotics)            Current Outpatient Medications:     bimatoprost (LUMIGAN) 0.03 % ophthalmic drops, 1 drop every evening., Disp: , Rfl:     coenzyme Q10 (CO Q-10) 10 mg capsule, Take 10 mg by mouth once daily., Disp: , Rfl:     fluoxetine (PROZAC) 10 MG capsule, Take 10 mg by mouth once daily., Disp: , Rfl:     lisinopril (PRINIVIL,ZESTRIL) 5 MG tablet, Take 5 mg by mouth once daily., Disp: , Rfl:     lithium carbonate 150 MG capsule, Take 150 mg by mouth 3 (three) times daily with meals., Disp: , Rfl:     rosuvastatin (CRESTOR) 10 MG tablet, Take 10 mg by mouth once daily., Disp: , Rfl:     travoprost, benzalkonium, (TRAVATAN) 0.004 % ophthalmic solution, 1 drop every evening.  , Disp: , Rfl:     carbidopa-levodopa  mg (SINEMET)  mg per tablet, Take 1 tablet by mouth 2 (two) times daily. Pt not sure of pill strength, bring in bottle next time, Disp: 60 tablet, Rfl: 2          Objective:   Vitals:  Blood pressure (!) 155/70, pulse 65, temperature 97.7 °F (36.5 °C), temperature source Oral, resp. rate 20, weight 70 kg (154 lb 6.4 oz).    Physical Examination:   GEN: no apparent distress, comfortable  HEAD: atraumatic and normocephalic  EYES: no pallor, no icterus  ENT: OMM, no pharyngeal erythema, external ears WNL; no nasal discharge; no thrush  NECK: no masses, thyroid normal, trachea midline, no LAD/LN's, supple  CV: RRR with no murmur; normal pulse; normal S1 and S2; no pedal edema  CHEST: Normal respiratory effort; CTAB; normal breath sounds; no wheeze or crackles  ABDOM: nontender and nondistended; soft;  no rebound/guarding, the liver and spleen are not palpable  MUSC/Skeletal: ROM normal; no crepitus; joints normal  EXTREM: no clubbing, cyanosis, inflammation or swelling  SKIN: no rashes, lesions, ulcers, petechia  : no  cvat  NEURO: grossly intact; motor/sensory WNL;  no tremors  PSYCH: normal mood, affect and behavior  LYMPH: normal cervical, supraclavicular, axillary and groin LN's  BREASTS:she left the bra on, I did not examine the breasts    Radiology/Diagnostic Studies:    Ferritin 117, Hgb 14.    Dr. Jain did colonoscopy on August 17, 2017, diverticular disease were found, biopsy was negative for colitis.      Assessment:   (1) 73 y.o. female with diagnosis of increased iron stores, or hemochromatosis. She is status post intermittent phlebotomy in the past. Ferritin level is normal at 117  and relatively stable. Observation is the plan. She will have follow-up CBC, and ferritin and return to clinic in 6 months time.    (2)other history includes hypertension, hypercholesterolemia, she will follow-up with Dr. Landeros for primary care. She has recently been diagnosed with Parkinson's disease and will follow-up with Dr. Spencer for that condition and Dr. Chapa for renal.    Return to clinic here in 6 months with CBC and ferritin.  Can Cancel.  RTC 1 year.

## 2019-10-08 ENCOUNTER — CLINICAL SUPPORT (OUTPATIENT)
Dept: FAMILY MEDICINE | Facility: CLINIC | Age: 74
End: 2019-10-08
Payer: COMMERCIAL

## 2019-10-08 VITALS — TEMPERATURE: 98 F

## 2019-10-08 DIAGNOSIS — Z23 NEED FOR INFLUENZA VACCINATION: Primary | ICD-10-CM

## 2019-10-08 PROCEDURE — 90471 FLU VACCINE - HIGH DOSE (65+) PRESERVATIVE FREE IM: ICD-10-PCS | Mod: S$GLB,,, | Performed by: NURSE PRACTITIONER

## 2019-10-08 PROCEDURE — 90471 IMMUNIZATION ADMIN: CPT | Mod: S$GLB,,, | Performed by: NURSE PRACTITIONER

## 2019-10-08 PROCEDURE — 90662 FLU VACCINE - HIGH DOSE (65+) PRESERVATIVE FREE IM: ICD-10-PCS | Mod: S$GLB,,, | Performed by: NURSE PRACTITIONER

## 2019-10-08 PROCEDURE — 90662 IIV NO PRSV INCREASED AG IM: CPT | Mod: S$GLB,,, | Performed by: NURSE PRACTITIONER

## 2019-11-18 DIAGNOSIS — Z12.31 ENCOUNTER FOR SCREENING MAMMOGRAM FOR MALIGNANT NEOPLASM OF BREAST: Primary | ICD-10-CM

## 2019-12-02 ENCOUNTER — HOSPITAL ENCOUNTER (OUTPATIENT)
Dept: RADIOLOGY | Facility: HOSPITAL | Age: 74
Discharge: HOME | End: 2019-12-02
Attending: FAMILY MEDICINE
Payer: COMMERCIAL

## 2019-12-02 VITALS — BODY MASS INDEX: 24.92 KG/M2 | HEIGHT: 66 IN

## 2019-12-02 DIAGNOSIS — Z12.31 ENCOUNTER FOR SCREENING MAMMOGRAM FOR MALIGNANT NEOPLASM OF BREAST: ICD-10-CM

## 2019-12-02 PROCEDURE — 77067 SCR MAMMO BI INCL CAD: CPT | Mod: TC,PO

## 2019-12-02 PROCEDURE — 77063 BREAST TOMOSYNTHESIS BI: CPT | Mod: TC,PO

## 2019-12-10 ENCOUNTER — TELEPHONE (OUTPATIENT)
Dept: FAMILY MEDICINE | Facility: CLINIC | Age: 74
End: 2019-12-10

## 2019-12-10 NOTE — TELEPHONE ENCOUNTER
----- Message from Sushil Bolton PA-C sent at 12/9/2019  7:38 PM CST -----  NO evidence of malignancy on mammogram. Repeat again in one year.

## 2019-12-23 ENCOUNTER — LAB VISIT (OUTPATIENT)
Dept: LAB | Facility: HOSPITAL | Age: 74
End: 2019-12-23
Attending: INTERNAL MEDICINE
Payer: COMMERCIAL

## 2019-12-23 DIAGNOSIS — N18.30 CHRONIC KIDNEY DISEASE, STAGE III (MODERATE): ICD-10-CM

## 2019-12-23 DIAGNOSIS — H40.9 GLAUCOMA SYNDROME: ICD-10-CM

## 2019-12-23 DIAGNOSIS — I10 ESSENTIAL HYPERTENSION, MALIGNANT: ICD-10-CM

## 2019-12-23 DIAGNOSIS — E55.9 AVITAMINOSIS D: ICD-10-CM

## 2019-12-23 DIAGNOSIS — D64.9 ANEMIA, UNSPECIFIED: Primary | ICD-10-CM

## 2019-12-23 DIAGNOSIS — D68.52 HETEROZYGOUS FOR PROTHROMBIN G20210A MUTATION: ICD-10-CM

## 2019-12-23 DIAGNOSIS — F31.9 BIPOLAR DISORDER, UNSPECIFIED: ICD-10-CM

## 2019-12-23 DIAGNOSIS — Z79.899 ENCOUNTER FOR LONG-TERM (CURRENT) USE OF OTHER MEDICATIONS: ICD-10-CM

## 2019-12-23 DIAGNOSIS — E78.5 HYPERLIPEMIA: ICD-10-CM

## 2019-12-23 LAB
ALBUMIN SERPL BCP-MCNC: 4 G/DL (ref 3.5–5.2)
ANION GAP SERPL CALC-SCNC: 8 MMOL/L (ref 8–16)
BACTERIA #/AREA URNS HPF: NEGATIVE /HPF
BASOPHILS # BLD AUTO: 0.05 K/UL (ref 0–0.2)
BASOPHILS NFR BLD: 1.1 % (ref 0–1.9)
BILIRUB UR QL STRIP: NEGATIVE
BUN SERPL-MCNC: 24 MG/DL (ref 8–23)
CALCIUM SERPL-MCNC: 9.4 MG/DL (ref 8.7–10.5)
CHLORIDE SERPL-SCNC: 106 MMOL/L (ref 95–110)
CLARITY UR: CLEAR
CO2 SERPL-SCNC: 24 MMOL/L (ref 23–29)
COLOR UR: YELLOW
CREAT SERPL-MCNC: 1.3 MG/DL (ref 0.5–1.4)
CREAT UR-MCNC: 156 MG/DL (ref 15–325)
DIFFERENTIAL METHOD: NORMAL
EOSINOPHIL # BLD AUTO: 0.2 K/UL (ref 0–0.5)
EOSINOPHIL NFR BLD: 3.4 % (ref 0–8)
ERYTHROCYTE [DISTWIDTH] IN BLOOD BY AUTOMATED COUNT: 12.2 % (ref 11.5–14.5)
EST. GFR  (AFRICAN AMERICAN): 46 ML/MIN/1.73 M^2
EST. GFR  (NON AFRICAN AMERICAN): 39.9 ML/MIN/1.73 M^2
GLUCOSE SERPL-MCNC: 105 MG/DL (ref 70–110)
GLUCOSE UR QL STRIP: NEGATIVE
HCT VFR BLD AUTO: 40.1 % (ref 37–48.5)
HGB BLD-MCNC: 13.6 G/DL (ref 12–16)
HGB UR QL STRIP: ABNORMAL
HYALINE CASTS #/AREA URNS LPF: 8 /LPF
IMM GRANULOCYTES # BLD AUTO: 0.01 K/UL (ref 0–0.04)
IMM GRANULOCYTES NFR BLD AUTO: 0.2 % (ref 0–0.5)
KETONES UR QL STRIP: NEGATIVE
LEUKOCYTE ESTERASE UR QL STRIP: ABNORMAL
LYMPHOCYTES # BLD AUTO: 1.3 K/UL (ref 1–4.8)
LYMPHOCYTES NFR BLD: 28.2 % (ref 18–48)
MCH RBC QN AUTO: 30.4 PG (ref 27–31)
MCHC RBC AUTO-ENTMCNC: 33.9 G/DL (ref 32–36)
MCV RBC AUTO: 90 FL (ref 82–98)
MICROSCOPIC COMMENT: ABNORMAL
MONOCYTES # BLD AUTO: 0.4 K/UL (ref 0.3–1)
MONOCYTES NFR BLD: 8.2 % (ref 4–15)
NEUTROPHILS # BLD AUTO: 2.8 K/UL (ref 1.8–7.7)
NEUTROPHILS NFR BLD: 58.9 % (ref 38–73)
NITRITE UR QL STRIP: NEGATIVE
NRBC BLD-RTO: 0 /100 WBC
PH UR STRIP: 5 [PH] (ref 5–8)
PHOSPHATE SERPL-MCNC: 3.3 MG/DL (ref 2.7–4.5)
PLATELET # BLD AUTO: 227 K/UL (ref 150–350)
PMV BLD AUTO: 10.1 FL (ref 9.2–12.9)
POTASSIUM SERPL-SCNC: 3.8 MMOL/L (ref 3.5–5.1)
PROT UR QL STRIP: NEGATIVE
PROT UR-MCNC: 13 MG/DL (ref 0–15)
PROT/CREAT UR: 0.08 MG/G{CREAT} (ref 0–0.2)
RBC # BLD AUTO: 4.47 M/UL (ref 4–5.4)
RBC #/AREA URNS HPF: 3 /HPF (ref 0–4)
SODIUM SERPL-SCNC: 138 MMOL/L (ref 136–145)
SP GR UR STRIP: 1.02 (ref 1–1.03)
SQUAMOUS #/AREA URNS HPF: 2 /HPF
URN SPEC COLLECT METH UR: ABNORMAL
UROBILINOGEN UR STRIP-ACNC: NEGATIVE EU/DL
WBC # BLD AUTO: 4.76 K/UL (ref 3.9–12.7)
WBC #/AREA URNS HPF: 5 /HPF (ref 0–5)

## 2019-12-23 PROCEDURE — 85025 COMPLETE CBC W/AUTO DIFF WBC: CPT

## 2019-12-23 PROCEDURE — 80069 RENAL FUNCTION PANEL: CPT

## 2019-12-23 PROCEDURE — 81001 URINALYSIS AUTO W/SCOPE: CPT

## 2019-12-23 PROCEDURE — 84156 ASSAY OF PROTEIN URINE: CPT

## 2019-12-23 PROCEDURE — 36415 COLL VENOUS BLD VENIPUNCTURE: CPT

## 2020-01-08 ENCOUNTER — LAB VISIT (OUTPATIENT)
Dept: LAB | Facility: HOSPITAL | Age: 75
End: 2020-01-08
Payer: COMMERCIAL

## 2020-01-08 DIAGNOSIS — I10 ESSENTIAL HYPERTENSION: Primary | ICD-10-CM

## 2020-01-08 DIAGNOSIS — F31.30 BIPOLAR I DISORDER, MOST RECENT EPISODE DEPRESSED: Primary | ICD-10-CM

## 2020-01-08 LAB — LITHIUM SERPL-SCNC: 0.2 MMOL/L (ref 0.6–1.2)

## 2020-01-08 PROCEDURE — 36415 COLL VENOUS BLD VENIPUNCTURE: CPT

## 2020-01-08 PROCEDURE — 80178 ASSAY OF LITHIUM: CPT

## 2020-01-08 RX ORDER — LISINOPRIL 20 MG/1
1 TABLET ORAL DAILY
COMMUNITY
Start: 2019-10-14 | End: 2020-01-08 | Stop reason: SDUPTHER

## 2020-01-08 RX ORDER — LISINOPRIL 20 MG/1
20 TABLET ORAL DAILY
Qty: 90 TABLET | Refills: 1 | Status: SHIPPED | OUTPATIENT
Start: 2020-01-08 | End: 2020-04-17 | Stop reason: SDUPTHER

## 2020-01-08 NOTE — TELEPHONE ENCOUNTER
----- Message from Kaela Gifford sent at 1/8/2020  1:47 PM CST -----  Contact: Payton  Refill lisinopril 20 mg tablets a  90 days supply.  Changing pharmacies  To [facility] on [address]. pts # [phone]

## 2020-03-02 ENCOUNTER — LAB VISIT (OUTPATIENT)
Dept: LAB | Facility: HOSPITAL | Age: 75
End: 2020-03-02
Attending: INTERNAL MEDICINE
Payer: COMMERCIAL

## 2020-03-02 DIAGNOSIS — I43 DILATED CARDIOMYOPATHY SECONDARY TO HEMOCHROMATOSIS: Primary | ICD-10-CM

## 2020-03-02 DIAGNOSIS — E83.119 DILATED CARDIOMYOPATHY SECONDARY TO HEMOCHROMATOSIS: Primary | ICD-10-CM

## 2020-03-02 LAB
BASOPHILS # BLD AUTO: 0.07 K/UL (ref 0–0.2)
BASOPHILS NFR BLD: 1.3 % (ref 0–1.9)
DIFFERENTIAL METHOD: NORMAL
EOSINOPHIL # BLD AUTO: 0.3 K/UL (ref 0–0.5)
EOSINOPHIL NFR BLD: 5.8 % (ref 0–8)
ERYTHROCYTE [DISTWIDTH] IN BLOOD BY AUTOMATED COUNT: 13 % (ref 11.5–14.5)
FERRITIN SERPL-MCNC: 157 NG/ML (ref 20–300)
HCT VFR BLD AUTO: 40.3 % (ref 37–48.5)
HGB BLD-MCNC: 13.3 G/DL (ref 12–16)
IMM GRANULOCYTES # BLD AUTO: 0.01 K/UL (ref 0–0.04)
IMM GRANULOCYTES NFR BLD AUTO: 0.2 % (ref 0–0.5)
LYMPHOCYTES # BLD AUTO: 1.3 K/UL (ref 1–4.8)
LYMPHOCYTES NFR BLD: 23 % (ref 18–48)
MCH RBC QN AUTO: 30.4 PG (ref 27–31)
MCHC RBC AUTO-ENTMCNC: 33 G/DL (ref 32–36)
MCV RBC AUTO: 92 FL (ref 82–98)
MONOCYTES # BLD AUTO: 0.4 K/UL (ref 0.3–1)
MONOCYTES NFR BLD: 7.1 % (ref 4–15)
NEUTROPHILS # BLD AUTO: 3.4 K/UL (ref 1.8–7.7)
NEUTROPHILS NFR BLD: 62.6 % (ref 38–73)
NRBC BLD-RTO: 0 /100 WBC
PLATELET # BLD AUTO: 247 K/UL (ref 150–350)
PMV BLD AUTO: 10.2 FL (ref 9.2–12.9)
RBC # BLD AUTO: 4.38 M/UL (ref 4–5.4)
WBC # BLD AUTO: 5.49 K/UL (ref 3.9–12.7)

## 2020-03-02 PROCEDURE — 36415 COLL VENOUS BLD VENIPUNCTURE: CPT

## 2020-03-02 PROCEDURE — 82728 ASSAY OF FERRITIN: CPT

## 2020-03-02 PROCEDURE — 85025 COMPLETE CBC W/AUTO DIFF WBC: CPT

## 2020-03-05 ENCOUNTER — TELEPHONE (OUTPATIENT)
Dept: HEMATOLOGY/ONCOLOGY | Facility: CLINIC | Age: 75
End: 2020-03-05

## 2020-03-05 DIAGNOSIS — E83.119 HEMOCHROMATOSIS, UNSPECIFIED HEMOCHROMATOSIS TYPE: Primary | ICD-10-CM

## 2020-04-14 ENCOUNTER — TELEPHONE (OUTPATIENT)
Dept: FAMILY MEDICINE | Facility: CLINIC | Age: 75
End: 2020-04-14

## 2020-04-14 NOTE — TELEPHONE ENCOUNTER
----- Message from Kaela Gifford sent at 4/14/2020 10:30 AM CDT -----  Contact: Payton  The patient has an apt April 23rd with Dr. Landeros. She can not do the virtual apt. She will need Rxs filled . What does she do ? pts # [phone]

## 2020-04-14 NOTE — TELEPHONE ENCOUNTER
Pt called back, states she does not have access to a smart phone but will need refills on all of her meds before the end of the month. Audio visit scheduled

## 2020-04-17 RX ORDER — BIMATOPROST 0.1 MG/ML
1 SOLUTION/ DROPS OPHTHALMIC NIGHTLY
COMMUNITY
Start: 2020-03-26

## 2020-04-23 ENCOUNTER — OFFICE VISIT (OUTPATIENT)
Dept: FAMILY MEDICINE | Facility: CLINIC | Age: 75
End: 2020-04-23
Payer: COMMERCIAL

## 2020-04-23 DIAGNOSIS — I10 ESSENTIAL HYPERTENSION: ICD-10-CM

## 2020-04-23 DIAGNOSIS — G20.A1 PARKINSON DISEASE: Primary | ICD-10-CM

## 2020-04-23 DIAGNOSIS — E78.00 HIGH CHOLESTEROL: ICD-10-CM

## 2020-04-23 DIAGNOSIS — Z78.0 MENOPAUSE: ICD-10-CM

## 2020-04-23 DIAGNOSIS — E83.119 HEMOCHROMATOSIS, UNSPECIFIED HEMOCHROMATOSIS TYPE: ICD-10-CM

## 2020-04-23 DIAGNOSIS — K21.9 GASTROESOPHAGEAL REFLUX DISEASE WITHOUT ESOPHAGITIS: ICD-10-CM

## 2020-04-23 DIAGNOSIS — N18.30 CKD (CHRONIC KIDNEY DISEASE) STAGE 3, GFR 30-59 ML/MIN: ICD-10-CM

## 2020-04-23 PROCEDURE — 1159F PR MEDICATION LIST DOCUMENTED IN MEDICAL RECORD: ICD-10-PCS | Mod: ,,, | Performed by: FAMILY MEDICINE

## 2020-04-23 PROCEDURE — 99213 OFFICE O/P EST LOW 20 MIN: CPT | Mod: 95,,, | Performed by: FAMILY MEDICINE

## 2020-04-23 PROCEDURE — 1101F PR PT FALLS ASSESS DOC 0-1 FALLS W/OUT INJ PAST YR: ICD-10-PCS | Mod: ,,, | Performed by: FAMILY MEDICINE

## 2020-04-23 PROCEDURE — 1159F MED LIST DOCD IN RCRD: CPT | Mod: ,,, | Performed by: FAMILY MEDICINE

## 2020-04-23 PROCEDURE — 99213 PR OFFICE/OUTPT VISIT, EST, LEVL III, 20-29 MIN: ICD-10-PCS | Mod: 95,,, | Performed by: FAMILY MEDICINE

## 2020-04-23 PROCEDURE — 1101F PT FALLS ASSESS-DOCD LE1/YR: CPT | Mod: ,,, | Performed by: FAMILY MEDICINE

## 2020-04-23 RX ORDER — LISINOPRIL 20 MG/1
20 TABLET ORAL DAILY
Qty: 90 TABLET | Refills: 1 | Status: SHIPPED | OUTPATIENT
Start: 2020-04-23 | End: 2020-11-02 | Stop reason: SDUPTHER

## 2020-04-23 RX ORDER — OMEPRAZOLE 20 MG/1
20 CAPSULE, DELAYED RELEASE ORAL DAILY
Qty: 30 CAPSULE | Refills: 5 | Status: SHIPPED | OUTPATIENT
Start: 2020-04-23 | End: 2021-05-03

## 2020-04-23 RX ORDER — FLUOXETINE 10 MG/1
10 CAPSULE ORAL DAILY
Qty: 90 CAPSULE | Refills: 1 | Status: SHIPPED | OUTPATIENT
Start: 2020-04-23 | End: 2022-05-05 | Stop reason: SDUPTHER

## 2020-04-23 RX ORDER — ROSUVASTATIN CALCIUM 10 MG/1
10 TABLET, COATED ORAL DAILY
Qty: 90 TABLET | Refills: 1 | Status: SHIPPED | OUTPATIENT
Start: 2020-04-23 | End: 2020-11-02

## 2020-04-23 NOTE — PROGRESS NOTES
Subjective:        The chief complaint leading to consultation is:  Acid reflux  The patient location is:  Home  Visit type: Virtual visit with synchronous audio/video or audio only  This was a phone conversation in lieu of in-person visit due to the coronavirus emergency. Patient acknowledged and agreed to the telephone encounter.     This 74-year-old female is here for a telemedicine visit.  Audio only.  She states she is being staying safe at home during this COVID-19 virus restrictions.  For exercise she will take a 45 min walk 5-6 times per week.  She denies any upper respiratory symptoms such as fever cough or shortness of breath.  She does admit to acid reflux flaring up recently.    Chronic kidney disease stage 3-sees Dr. Lukas Horton-GFR equals 46    She sees Dr. Derick Nava for hemochromatosis.  Last hematocrit was 40 ferritin level 157.    Sees Dr. David Bradley for Parkinson's disease-she has increased tone and cogwheeling but no tremor      Past Surgical History:   Procedure Laterality Date    CATARACT EXTRACTION  2016    HYSTERECTOMY       Past Medical History:   Diagnosis Date    Glaucoma      Family History   Problem Relation Age of Onset    Cancer Mother         Social History:   Marital Status: Single  Alcohol History:  reports that she does not drink alcohol.  Tobacco History:  reports that she has never smoked. She has never used smokeless tobacco.  Drug History:  reports that she does not use drugs.    Review of patient's allergies indicates:   Allergen Reactions    Codeine     Sulfa (sulfonamide antibiotics)     Sulfur Other (See Comments)       Current Outpatient Medications   Medication Sig Dispense Refill    bimatoprost (LUMIGAN) 0.03 % ophthalmic drops 1 drop every evening.      carbidopa-levodopa  mg (SINEMET)  mg per tablet Take 1 tablet by mouth 2 (two) times daily. Pt not sure of pill strength, bring in bottle next time 60 tablet 2    coenzyme Q10 (CO Q-10)  10 mg capsule Take 10 mg by mouth once daily.      FLUoxetine 10 MG capsule Take 1 capsule (10 mg total) by mouth once daily. 90 capsule 1    lisinopriL (PRINIVIL,ZESTRIL) 20 MG tablet Take 1 tablet (20 mg total) by mouth once daily. 90 tablet 1    lithium carbonate 150 MG capsule Take 150 mg by mouth 3 (three) times daily with meals.      LUMIGAN 0.01 % Drop INSTILL 1 DROP BOTH EYES EVERY NIGHT      omeprazole (PRILOSEC) 20 MG capsule Take 1 capsule (20 mg total) by mouth once daily. 30 capsule 5    rosuvastatin (CRESTOR) 10 MG tablet Take 1 tablet (10 mg total) by mouth once daily. 90 tablet 1    travoprost, benzalkonium, (TRAVATAN) 0.004 % ophthalmic solution 1 drop every evening.         No current facility-administered medications for this visit.        Review of Systems   Constitutional: Negative for appetite change, chills, fatigue, fever and unexpected weight change.        Trying to increase fiber   HENT: Negative for congestion, ear pain, sinus pain, sore throat and trouble swallowing.    Eyes: Negative for pain, discharge and visual disturbance.   Respiratory: Negative for apnea, cough, shortness of breath and wheezing.    Cardiovascular: Negative for chest pain, palpitations and leg swelling.   Gastrointestinal: Positive for diarrhea (after meals,Dr Jain blamed lithium). Negative for abdominal pain, blood in stool, constipation, nausea and vomiting.        Gerd is flaring up   Endocrine: Negative for heat intolerance, polydipsia and polyuria.   Genitourinary: Negative for difficulty urinating, dyspareunia, dysuria, frequency, hematuria and menstrual problem.   Musculoskeletal: Negative for arthralgias, back pain, gait problem, joint swelling and myalgias.        Still like to bowl   Allergic/Immunologic: Negative for environmental allergies, food allergies and immunocompromised state.   Neurological: Negative for dizziness, tremors, seizures, numbness and headaches.        See Dr Bradley for  parkinson's disease, increased tone ,cogwheeling on no sinemet x 1 yr   Psychiatric/Behavioral: Negative for behavioral problems, confusion, hallucinations and suicidal ideas. The patient is not nervous/anxious.          Objective:        Physical Exam:   Physical Exam         Assessment:       1. Parkinson disease    2. Essential hypertension    3. Menopause    4. Gastroesophageal reflux disease without esophagitis    5. Hemochromatosis, unspecified hemochromatosis type    6. High cholesterol    7. CKD (chronic kidney disease) stage 3, GFR 30-59 ml/min      Plan:   Parkinson disease  Continue current Parkinson's medications  Essential hypertension  -     lisinopriL (PRINIVIL,ZESTRIL) 20 MG tablet; Take 1 tablet (20 mg total) by mouth once daily.  Dispense: 90 tablet; Refill: 1  Blood pressure stable on current medication  Menopause  -     FLUoxetine 10 MG capsule; Take 1 capsule (10 mg total) by mouth once daily.  Dispense: 90 capsule; Refill: 1  -     DXA Bone Density Spine And Hip; Future; Expected date: 05/23/2020  DEXA scan later in fall  Gastroesophageal reflux disease without esophagitis  -     omeprazole (PRILOSEC) 20 MG capsule; Take 1 capsule (20 mg total) by mouth once daily.  Dispense: 30 capsule; Refill: 5  Add omeprazole daily  Hemochromatosis, unspecified hemochromatosis type  Continue to see Dr. Derick Nava  High cholesterol  -     rosuvastatin (CRESTOR) 10 MG tablet; Take 1 tablet (10 mg total) by mouth once daily.  Dispense: 90 tablet; Refill: 1  -     Lipid panel; Future; Expected date: 04/23/2020  -     Comprehensive metabolic panel; Future; Expected date: 04/23/2020  Continue rosuvastatin will get a lipid panel in 6 months  CKD (chronic kidney disease) stage 3, GFR 30-59 ml/min  Stable with a GFR in the 40s    No follow-ups on file.    Total time spent with patient:  20 min    Each patient to whom he or she provides medical services by telemedicine is:  (1) informed of the relationship between  the physician and patient and the respective role of any other health care provider with respect to management of the patient; and (2) notified that he or she may decline to receive medical services by telemedicine and may withdraw from such care at any time.    This note was created using [employer] voice recognition software that occasionally misinterprets phrases or words. Established Patient - Audio Only Telehealth Visit                               This service was not originating from a related E/M service provided within the previous 7 days nor will  to an E/M service or procedure within the next 24 hours or my soonest available appointment.  Prevailing standard of care was able to be met in this audio-only visit.

## 2020-04-29 ENCOUNTER — TELEPHONE (OUTPATIENT)
Dept: FAMILY MEDICINE | Facility: CLINIC | Age: 75
End: 2020-04-29

## 2020-04-29 NOTE — TELEPHONE ENCOUNTER
----- Message from Soco Avila MA sent at 4/29/2020 11:52 AM CDT -----      ----- Message -----  From: Max Landeros MD  Sent: 4/23/2020   8:22 PM CDT  To: Soco Aivla MA    Call patient and set up a six-month office visit with lab work

## 2020-06-09 ENCOUNTER — LAB VISIT (OUTPATIENT)
Dept: LAB | Facility: HOSPITAL | Age: 75
End: 2020-06-09
Payer: COMMERCIAL

## 2020-06-09 DIAGNOSIS — F31.30 BIPOLAR I DISORDER, MOST RECENT EPISODE DEPRESSED: Primary | ICD-10-CM

## 2020-06-09 LAB — LITHIUM SERPL-SCNC: 1 MMOL/L (ref 0.6–1.2)

## 2020-06-09 PROCEDURE — 80178 ASSAY OF LITHIUM: CPT

## 2020-06-09 PROCEDURE — 36415 COLL VENOUS BLD VENIPUNCTURE: CPT

## 2020-06-22 ENCOUNTER — HOSPITAL ENCOUNTER (OUTPATIENT)
Dept: RADIOLOGY | Facility: HOSPITAL | Age: 75
Discharge: HOME | End: 2020-06-22
Attending: FAMILY MEDICINE
Payer: COMMERCIAL

## 2020-06-22 DIAGNOSIS — Z78.0 MENOPAUSE: ICD-10-CM

## 2020-06-22 PROCEDURE — 77080 DXA BONE DENSITY AXIAL: CPT | Mod: TC,PO

## 2020-07-01 ENCOUNTER — TELEPHONE (OUTPATIENT)
Dept: FAMILY MEDICINE | Facility: CLINIC | Age: 75
End: 2020-07-01

## 2020-07-01 NOTE — TELEPHONE ENCOUNTER
Spoke to patient with results verbatim per Dr Landeros. Informed her Dr Landeros would likd for her to take Calcium plus D twice a day, states she was only taking Vitamin D, but will start calcium as well. Added to med list.

## 2020-07-01 NOTE — TELEPHONE ENCOUNTER
----- Message from Max Landeros MD sent at 7/1/2020  3:07 PM CDT -----  Call patient.  DEXA scan shows osteopenia of the bones.  Treatment includes taking calcium plus D vitamins twice a day.  Recheck DEXA scan in 2 years

## 2020-09-08 ENCOUNTER — LAB VISIT (OUTPATIENT)
Dept: LAB | Facility: HOSPITAL | Age: 75
End: 2020-09-08
Attending: INTERNAL MEDICINE
Payer: COMMERCIAL

## 2020-09-08 DIAGNOSIS — E83.119 HEMOCHROMATOSIS, UNSPECIFIED HEMOCHROMATOSIS TYPE: ICD-10-CM

## 2020-09-08 LAB
BASOPHILS # BLD AUTO: 0.04 K/UL (ref 0–0.2)
BASOPHILS NFR BLD: 0.8 % (ref 0–1.9)
DIFFERENTIAL METHOD: NORMAL
EOSINOPHIL # BLD AUTO: 0.2 K/UL (ref 0–0.5)
EOSINOPHIL NFR BLD: 5.1 % (ref 0–8)
ERYTHROCYTE [DISTWIDTH] IN BLOOD BY AUTOMATED COUNT: 12.7 % (ref 11.5–14.5)
FERRITIN SERPL-MCNC: 138 NG/ML (ref 20–300)
HCT VFR BLD AUTO: 39.6 % (ref 37–48.5)
HGB BLD-MCNC: 12.9 G/DL (ref 12–16)
IMM GRANULOCYTES # BLD AUTO: 0.02 K/UL (ref 0–0.04)
IMM GRANULOCYTES NFR BLD AUTO: 0.4 % (ref 0–0.5)
LYMPHOCYTES # BLD AUTO: 1.4 K/UL (ref 1–4.8)
LYMPHOCYTES NFR BLD: 29.1 % (ref 18–48)
MCH RBC QN AUTO: 30.6 PG (ref 27–31)
MCHC RBC AUTO-ENTMCNC: 32.6 G/DL (ref 32–36)
MCV RBC AUTO: 94 FL (ref 82–98)
MONOCYTES # BLD AUTO: 0.4 K/UL (ref 0.3–1)
MONOCYTES NFR BLD: 8.5 % (ref 4–15)
NEUTROPHILS # BLD AUTO: 2.6 K/UL (ref 1.8–7.7)
NEUTROPHILS NFR BLD: 56.1 % (ref 38–73)
NRBC BLD-RTO: 0 /100 WBC
PLATELET # BLD AUTO: 242 K/UL (ref 150–350)
PMV BLD AUTO: 10.1 FL (ref 9.2–12.9)
RBC # BLD AUTO: 4.21 M/UL (ref 4–5.4)
WBC # BLD AUTO: 4.71 K/UL (ref 3.9–12.7)

## 2020-09-08 PROCEDURE — 36415 COLL VENOUS BLD VENIPUNCTURE: CPT

## 2020-09-08 PROCEDURE — 82728 ASSAY OF FERRITIN: CPT

## 2020-09-08 PROCEDURE — 85025 COMPLETE CBC W/AUTO DIFF WBC: CPT

## 2020-09-14 ENCOUNTER — OFFICE VISIT (OUTPATIENT)
Dept: HEMATOLOGY/ONCOLOGY | Facility: CLINIC | Age: 75
End: 2020-09-14
Payer: COMMERCIAL

## 2020-09-14 VITALS
RESPIRATION RATE: 18 BRPM | TEMPERATURE: 98 F | DIASTOLIC BLOOD PRESSURE: 71 MMHG | BODY MASS INDEX: 25.23 KG/M2 | WEIGHT: 156.31 LBS | SYSTOLIC BLOOD PRESSURE: 193 MMHG | HEART RATE: 74 BPM

## 2020-09-14 DIAGNOSIS — E83.118 OTHER HEMOCHROMATOSIS: Primary | ICD-10-CM

## 2020-09-14 PROCEDURE — 1101F PR PT FALLS ASSESS DOC 0-1 FALLS W/OUT INJ PAST YR: ICD-10-PCS | Mod: S$GLB,,, | Performed by: INTERNAL MEDICINE

## 2020-09-14 PROCEDURE — 3077F PR MOST RECENT SYSTOLIC BLOOD PRESSURE >= 140 MM HG: ICD-10-PCS | Mod: S$GLB,,, | Performed by: INTERNAL MEDICINE

## 2020-09-14 PROCEDURE — 1126F PR PAIN SEVERITY QUANTIFIED, NO PAIN PRESENT: ICD-10-PCS | Mod: S$GLB,,, | Performed by: INTERNAL MEDICINE

## 2020-09-14 PROCEDURE — 3078F DIAST BP <80 MM HG: CPT | Mod: S$GLB,,, | Performed by: INTERNAL MEDICINE

## 2020-09-14 PROCEDURE — 3077F SYST BP >= 140 MM HG: CPT | Mod: S$GLB,,, | Performed by: INTERNAL MEDICINE

## 2020-09-14 PROCEDURE — 1159F MED LIST DOCD IN RCRD: CPT | Mod: S$GLB,,, | Performed by: INTERNAL MEDICINE

## 2020-09-14 PROCEDURE — 1101F PT FALLS ASSESS-DOCD LE1/YR: CPT | Mod: S$GLB,,, | Performed by: INTERNAL MEDICINE

## 2020-09-14 PROCEDURE — 3078F PR MOST RECENT DIASTOLIC BLOOD PRESSURE < 80 MM HG: ICD-10-PCS | Mod: S$GLB,,, | Performed by: INTERNAL MEDICINE

## 2020-09-14 PROCEDURE — 99214 PR OFFICE/OUTPT VISIT, EST, LEVL IV, 30-39 MIN: ICD-10-PCS | Mod: S$GLB,,, | Performed by: INTERNAL MEDICINE

## 2020-09-14 PROCEDURE — 99214 OFFICE O/P EST MOD 30 MIN: CPT | Mod: S$GLB,,, | Performed by: INTERNAL MEDICINE

## 2020-09-14 PROCEDURE — 1159F PR MEDICATION LIST DOCUMENTED IN MEDICAL RECORD: ICD-10-PCS | Mod: S$GLB,,, | Performed by: INTERNAL MEDICINE

## 2020-09-14 PROCEDURE — 1126F AMNT PAIN NOTED NONE PRSNT: CPT | Mod: S$GLB,,, | Performed by: INTERNAL MEDICINE

## 2020-09-15 NOTE — PROGRESS NOTES
[facility] hematology oncology in office follow-up encounter progress note    2020    Subjective:      Patient ID:   Payton Gutierrez  74 y.o. female  1945  [place]      Chief Complaint:   Iron discorder follow up    HPI:  74 y.o. female with diagnosis of increased iron stores, hemochromatosis treated with intermittent phlebotomy in the past. Ferritin is 117 , Hgb 14 now, phlebotomy is on hold. Her last therapeutic phlebotomy   was 3 1/2 years  Ago.    She will have CBC done every 6 months, our plan is to resume phlebotomy when her ferritin is in the 150 range.    She has history of GERD, glaucoma, hypercholesterolemia,hypertension, and has been diagnosed with Parkinson's disease.  She sees Dr. Spencer.  He tells me her Parkinson's symptoms have resolved, and she is off Parkinson medications.    She is status post tonsillectomy, appendectomy, complete hysterectomy.    She is a retired , she is allergic to codeine. She does not smoke, she does not drink alcohol with regularity.    Her dad passed away of a motor vehicle accident and her mother  of vaginal cancer. She has a brother with the hemochromatosis gene and increased ferritin on intermittent phlebotomy.    She denies gallbladder disease jaundice or hepatitis.  She is on lithium.  She is seeing Dr. Watts for renal dx.  She recently had diverticulitis.    2nd cousin Adry STARKS.    ROS:   GEN: normal without any fever, night sweats or weight loss  HEENT: normal with no HA's, sore throat, stiff neck, changes in vision  CV: normal with no CP, SOB, PND, SAUNDERS or orthopnea  PULM: normal with no SOB, cough, hemoptysis, sputum or pleuritic pain  GI: normal with no abdominal pain, nausea, vomiting, constipation, diarrhea, melanotic stools, BRBPR, or hematemesis  : normal with no hematuria, dysuria  BREAST: normal with no mass, discharge, pain  SKIN: normal with no rash, erythema, bruising, or swelling     Past Medical History:    Diagnosis Date    Glaucoma      Past Surgical History:   Procedure Laterality Date    CATARACT EXTRACTION  2016    HYSTERECTOMY         Review of patient's allergies indicates:   Allergen Reactions    Codeine     Sulfa (sulfonamide antibiotics)            Current Outpatient Medications:     bimatoprost (LUMIGAN) 0.03 % ophthalmic drops, 1 drop every evening., Disp: , Rfl:     calcium carbonate/vitamin D3 (CALCIUM 500 + D ORAL), Take 1 capsule by mouth 2 (two) times a day., Disp: , Rfl:     coenzyme Q10 (CO Q-10) 10 mg capsule, Take 10 mg by mouth once daily., Disp: , Rfl:     FLUoxetine 10 MG capsule, Take 1 capsule (10 mg total) by mouth once daily., Disp: 90 capsule, Rfl: 1    lisinopriL (PRINIVIL,ZESTRIL) 20 MG tablet, Take 1 tablet (20 mg total) by mouth once daily., Disp: 90 tablet, Rfl: 1    lithium carbonate 150 MG capsule, Take 150 mg by mouth 3 (three) times daily with meals., Disp: , Rfl:     LUMIGAN 0.01 % Drop, INSTILL 1 DROP BOTH EYES EVERY NIGHT, Disp: , Rfl:     omeprazole (PRILOSEC) 20 MG capsule, Take 1 capsule (20 mg total) by mouth once daily., Disp: 30 capsule, Rfl: 5    rosuvastatin (CRESTOR) 10 MG tablet, Take 1 tablet (10 mg total) by mouth once daily., Disp: 90 tablet, Rfl: 1    travoprost, benzalkonium, (TRAVATAN) 0.004 % ophthalmic solution, 1 drop every evening.  , Disp: , Rfl:     carbidopa-levodopa  mg (SINEMET)  mg per tablet, Take 1 tablet by mouth 2 (two) times daily. Pt not sure of pill strength, bring in bottle next time, Disp: 60 tablet, Rfl: 2          Objective:   Vitals:  Blood pressure (!) 193/71, pulse 74, temperature 98.2 °F (36.8 °C), resp. rate 18, weight 70.9 kg (156 lb 4.8 oz).    Physical Examination:   GEN: no apparent distress, comfortable  HEAD: atraumatic and normocephalic  EYES: no pallor, no icterus  ENT: OMM, no pharyngeal erythema, external ears WNL; no nasal discharge; no thrush  NECK: no masses, thyroid normal, trachea midline, no  LAD/LN's, supple  CV: RRR with no murmur; normal pulse; normal S1 and S2; no pedal edema  CHEST: Normal respiratory effort; CTAB; normal breath sounds; no wheeze or crackles  ABDOM: nontender and nondistended; soft;  no rebound/guarding, the liver and spleen are not palpable  MUSC/Skeletal: ROM normal; no crepitus; joints normal  EXTREM: no clubbing, cyanosis, inflammation or swelling  SKIN: no rashes, lesions, ulcers, petechia  : no cvat  NEURO: grossly intact; motor/sensory WNL;  no tremors  PSYCH: normal mood, affect and behavior  LYMPH: normal cervical, supraclavicular, axillary and groin LN's  BREASTS:she left the bra on, I did not examine the breasts    Radiology/Diagnostic Studies:    Ferritin 139, Hgb 12.8.  Stable.    Dr. Jain did colonoscopy on August 17, 2017, diverticular disease were found, biopsy was negative for colitis.      Assessment:   (1) 74 y.o. female with diagnosis of increased iron stores, or hemochromatosis. She is status post intermittent phlebotomy in the past. Ferritin level is normal at 139  and relatively stable. Observation is the plan. She will have follow-up CBC, and ferritin and return to clinic in 6 months time.    (2)other history includes hypertension, hypercholesterolemia, she will follow-up with Dr. Landeros for primary care. She has recently been diagnosed with Parkinson's disease and will follow-up with Dr. Spencer for that condition and Dr. Chapa for renal.    Return to clinic here in 6 months with CBC and ferritin.  Can Cancel.  RTC 1 year.

## 2020-09-23 ENCOUNTER — LAB VISIT (OUTPATIENT)
Dept: LAB | Facility: HOSPITAL | Age: 75
End: 2020-09-23
Attending: INTERNAL MEDICINE
Payer: COMMERCIAL

## 2020-09-23 DIAGNOSIS — N18.30 CHRONIC KIDNEY DISEASE, STAGE III (MODERATE): Primary | ICD-10-CM

## 2020-09-23 LAB
25(OH)D3+25(OH)D2 SERPL-MCNC: 55 NG/ML (ref 30–96)
ALBUMIN SERPL BCP-MCNC: 3.9 G/DL (ref 3.5–5.2)
ANION GAP SERPL CALC-SCNC: 9 MMOL/L (ref 8–16)
BACTERIA #/AREA URNS HPF: NEGATIVE /HPF
BASOPHILS # BLD AUTO: 0.05 K/UL (ref 0–0.2)
BASOPHILS NFR BLD: 1 % (ref 0–1.9)
BUN SERPL-MCNC: 20 MG/DL (ref 8–23)
CALCIUM SERPL-MCNC: 9.7 MG/DL (ref 8.7–10.5)
CHLORIDE SERPL-SCNC: 106 MMOL/L (ref 95–110)
CO2 SERPL-SCNC: 23 MMOL/L (ref 23–29)
CREAT SERPL-MCNC: 1.3 MG/DL (ref 0.5–1.4)
DIFFERENTIAL METHOD: NORMAL
EOSINOPHIL # BLD AUTO: 0.2 K/UL (ref 0–0.5)
EOSINOPHIL NFR BLD: 3.8 % (ref 0–8)
ERYTHROCYTE [DISTWIDTH] IN BLOOD BY AUTOMATED COUNT: 12.7 % (ref 11.5–14.5)
EST. GFR  (AFRICAN AMERICAN): 46 ML/MIN/1.73 M^2
EST. GFR  (NON AFRICAN AMERICAN): 39.9 ML/MIN/1.73 M^2
GLUCOSE SERPL-MCNC: 103 MG/DL (ref 70–110)
HCT VFR BLD AUTO: 37.9 % (ref 37–48.5)
HGB BLD-MCNC: 12.5 G/DL (ref 12–16)
HYALINE CASTS #/AREA URNS LPF: 3 /LPF
IMM GRANULOCYTES # BLD AUTO: 0.02 K/UL (ref 0–0.04)
IMM GRANULOCYTES NFR BLD AUTO: 0.4 % (ref 0–0.5)
LYMPHOCYTES # BLD AUTO: 1.3 K/UL (ref 1–4.8)
LYMPHOCYTES NFR BLD: 25.8 % (ref 18–48)
MCH RBC QN AUTO: 30.9 PG (ref 27–31)
MCHC RBC AUTO-ENTMCNC: 33 G/DL (ref 32–36)
MCV RBC AUTO: 94 FL (ref 82–98)
MICROSCOPIC COMMENT: ABNORMAL
MONOCYTES # BLD AUTO: 0.5 K/UL (ref 0.3–1)
MONOCYTES NFR BLD: 9.1 % (ref 4–15)
NEUTROPHILS # BLD AUTO: 3 K/UL (ref 1.8–7.7)
NEUTROPHILS NFR BLD: 59.9 % (ref 38–73)
NRBC BLD-RTO: 0 /100 WBC
PHOSPHATE SERPL-MCNC: 3.6 MG/DL (ref 2.7–4.5)
PLATELET # BLD AUTO: 243 K/UL (ref 150–350)
PMV BLD AUTO: 10.2 FL (ref 9.2–12.9)
POTASSIUM SERPL-SCNC: 4.2 MMOL/L (ref 3.5–5.1)
PROT UR-MCNC: 10 MG/DL (ref 6–15)
RBC # BLD AUTO: 4.05 M/UL (ref 4–5.4)
RBC #/AREA URNS HPF: 1 /HPF (ref 0–4)
SODIUM SERPL-SCNC: 138 MMOL/L (ref 136–145)
SQUAMOUS #/AREA URNS HPF: 1 /HPF
WBC # BLD AUTO: 5.04 K/UL (ref 3.9–12.7)
WBC #/AREA URNS HPF: 2 /HPF (ref 0–5)

## 2020-09-23 PROCEDURE — 85025 COMPLETE CBC W/AUTO DIFF WBC: CPT

## 2020-09-23 PROCEDURE — 80069 RENAL FUNCTION PANEL: CPT

## 2020-09-23 PROCEDURE — 84156 ASSAY OF PROTEIN URINE: CPT

## 2020-09-23 PROCEDURE — 82306 VITAMIN D 25 HYDROXY: CPT

## 2020-09-23 PROCEDURE — 36415 COLL VENOUS BLD VENIPUNCTURE: CPT

## 2020-09-23 PROCEDURE — 81001 URINALYSIS AUTO W/SCOPE: CPT

## 2020-10-22 ENCOUNTER — TELEPHONE (OUTPATIENT)
Dept: FAMILY MEDICINE | Facility: CLINIC | Age: 75
End: 2020-10-22

## 2020-10-22 NOTE — TELEPHONE ENCOUNTER
----- Message from Mile Vargas sent at 10/22/2020 11:04 AM CDT -----  Ms. Cain came in and has an upcoming appt she needs to know if she needs labs before her appt?

## 2020-10-30 ENCOUNTER — TELEPHONE (OUTPATIENT)
Dept: FAMILY MEDICINE | Facility: CLINIC | Age: 75
End: 2020-10-30

## 2020-10-30 NOTE — TELEPHONE ENCOUNTER
Lines either busy on no answer at all phone #s given.  She needs lab done during her OV 11/2 if she is fasting/ba

## 2020-11-02 ENCOUNTER — OFFICE VISIT (OUTPATIENT)
Dept: FAMILY MEDICINE | Facility: CLINIC | Age: 75
End: 2020-11-02
Payer: COMMERCIAL

## 2020-11-02 VITALS
SYSTOLIC BLOOD PRESSURE: 134 MMHG | TEMPERATURE: 98 F | HEIGHT: 66 IN | HEART RATE: 68 BPM | BODY MASS INDEX: 24.75 KG/M2 | WEIGHT: 154 LBS | DIASTOLIC BLOOD PRESSURE: 88 MMHG

## 2020-11-02 DIAGNOSIS — G20.A1 PARKINSON DISEASE: Primary | ICD-10-CM

## 2020-11-02 DIAGNOSIS — K21.9 GASTROESOPHAGEAL REFLUX DISEASE WITHOUT ESOPHAGITIS: ICD-10-CM

## 2020-11-02 DIAGNOSIS — I10 ESSENTIAL HYPERTENSION: ICD-10-CM

## 2020-11-02 DIAGNOSIS — E83.118 OTHER HEMOCHROMATOSIS: ICD-10-CM

## 2020-11-02 DIAGNOSIS — Z78.0 MENOPAUSE: ICD-10-CM

## 2020-11-02 DIAGNOSIS — N18.31 STAGE 3A CHRONIC KIDNEY DISEASE: ICD-10-CM

## 2020-11-02 DIAGNOSIS — Z23 NEED FOR INFLUENZA VACCINATION: ICD-10-CM

## 2020-11-02 DIAGNOSIS — E78.00 HIGH CHOLESTEROL: ICD-10-CM

## 2020-11-02 DIAGNOSIS — F31.76 BIPOLAR DISORDER, IN FULL REMISSION, MOST RECENT EPISODE DEPRESSED: ICD-10-CM

## 2020-11-02 PROCEDURE — 3079F DIAST BP 80-89 MM HG: CPT | Mod: S$GLB,,, | Performed by: FAMILY MEDICINE

## 2020-11-02 PROCEDURE — 3075F PR MOST RECENT SYSTOLIC BLOOD PRESS GE 130-139MM HG: ICD-10-PCS | Mod: S$GLB,,, | Performed by: FAMILY MEDICINE

## 2020-11-02 PROCEDURE — 3075F SYST BP GE 130 - 139MM HG: CPT | Mod: S$GLB,,, | Performed by: FAMILY MEDICINE

## 2020-11-02 PROCEDURE — 90471 FLU VACCINE - QUADRIVALENT - HIGH DOSE (65+) PRESERVATIVE FREE IM: ICD-10-PCS | Mod: S$GLB,,, | Performed by: FAMILY MEDICINE

## 2020-11-02 PROCEDURE — 90662 FLU VACCINE - QUADRIVALENT - HIGH DOSE (65+) PRESERVATIVE FREE IM: ICD-10-PCS | Mod: S$GLB,,, | Performed by: FAMILY MEDICINE

## 2020-11-02 PROCEDURE — 3079F PR MOST RECENT DIASTOLIC BLOOD PRESSURE 80-89 MM HG: ICD-10-PCS | Mod: S$GLB,,, | Performed by: FAMILY MEDICINE

## 2020-11-02 PROCEDURE — 90662 IIV NO PRSV INCREASED AG IM: CPT | Mod: S$GLB,,, | Performed by: FAMILY MEDICINE

## 2020-11-02 PROCEDURE — 1159F MED LIST DOCD IN RCRD: CPT | Mod: S$GLB,,, | Performed by: FAMILY MEDICINE

## 2020-11-02 PROCEDURE — 99214 OFFICE O/P EST MOD 30 MIN: CPT | Mod: 25,S$GLB,, | Performed by: FAMILY MEDICINE

## 2020-11-02 PROCEDURE — 90471 IMMUNIZATION ADMIN: CPT | Mod: S$GLB,,, | Performed by: FAMILY MEDICINE

## 2020-11-02 PROCEDURE — 1101F PT FALLS ASSESS-DOCD LE1/YR: CPT | Mod: S$GLB,,, | Performed by: FAMILY MEDICINE

## 2020-11-02 PROCEDURE — 1159F PR MEDICATION LIST DOCUMENTED IN MEDICAL RECORD: ICD-10-PCS | Mod: S$GLB,,, | Performed by: FAMILY MEDICINE

## 2020-11-02 PROCEDURE — 99214 PR OFFICE/OUTPT VISIT, EST, LEVL IV, 30-39 MIN: ICD-10-PCS | Mod: 25,S$GLB,, | Performed by: FAMILY MEDICINE

## 2020-11-02 PROCEDURE — 1101F PR PT FALLS ASSESS DOC 0-1 FALLS W/OUT INJ PAST YR: ICD-10-PCS | Mod: S$GLB,,, | Performed by: FAMILY MEDICINE

## 2020-11-02 RX ORDER — OMEPRAZOLE 20 MG/1
20 CAPSULE, DELAYED RELEASE ORAL DAILY
Qty: 30 CAPSULE | Refills: 5 | Status: CANCELLED | OUTPATIENT
Start: 2020-11-02 | End: 2021-11-02

## 2020-11-02 RX ORDER — FLUOXETINE 10 MG/1
10 CAPSULE ORAL DAILY
Qty: 90 CAPSULE | Refills: 1 | Status: CANCELLED | OUTPATIENT
Start: 2020-11-02

## 2020-11-02 RX ORDER — LITHIUM CARBONATE 150 MG/1
150 CAPSULE ORAL
Status: CANCELLED | OUTPATIENT
Start: 2020-11-02

## 2020-11-02 RX ORDER — LISINOPRIL 20 MG/1
20 TABLET ORAL DAILY
Qty: 90 TABLET | Refills: 1 | Status: SHIPPED | OUTPATIENT
Start: 2020-11-02 | End: 2021-05-03 | Stop reason: SDUPTHER

## 2020-11-02 NOTE — PROGRESS NOTES
SUBJECTIVE:    Patient ID: Payton Gutierrez is a 75 y.o. female.    Chief Complaint: Follow-up (brought bottles ac)    This 75-year-old female stays active by walking 45 minutes 4-5 days per week.  She has taken herself off her cholesterol medication rosuvastatin accidentally.  She sees Dr. Derick Nava every 6 months for hemochromatosis.  Her ferritin levels have been normal lately and she has not had to do a phlebotomy in the last 4 years.    Chronic kidney disease-sees -GFR is 46 and stable.    Bipolar disease- sees Dr. Loni Lombardo is maintained on lithium 150 mg b.i.d.    Parkinson's disease is mild and she is currently on no medications for this.  She was taken off her Sinemet concurrent is followed by .      Office Visit on 11/02/2020   Component Date Value Ref Range Status    Lithium Level 11/02/2020 0.5* 0.6 - 1.2 mmol/L Final    Cholesterol 11/02/2020 274* <200 mg/dL Final    HDL 11/02/2020 56  > OR = 50 mg/dL Final    Triglycerides 11/02/2020 146  <150 mg/dL Final    LDL Cholesterol 11/02/2020 189* mg/dL (calc) Final    HDL/Cholesterol Ratio 11/02/2020 4.9  <5.0 (calc) Final    Non HDL Chol. (LDL+VLDL) 11/02/2020 218* <130 mg/dL (calc) Final   Lab Visit on 09/23/2020   Component Date Value Ref Range Status    Protein, Urine Random 09/23/2020 10  6 - 15 mg/dL Final    Vit D, 25-Hydroxy 09/23/2020 55  30 - 96 ng/mL Final    WBC 09/23/2020 5.04  3.90 - 12.70 K/uL Final    RBC 09/23/2020 4.05  4.00 - 5.40 M/uL Final    Hemoglobin 09/23/2020 12.5  12.0 - 16.0 g/dL Final    Hematocrit 09/23/2020 37.9  37.0 - 48.5 % Final    MCV 09/23/2020 94  82 - 98 fL Final    MCH 09/23/2020 30.9  27.0 - 31.0 pg Final    MCHC 09/23/2020 33.0  32.0 - 36.0 g/dL Final    RDW 09/23/2020 12.7  11.5 - 14.5 % Final    Platelets 09/23/2020 243  150 - 350 K/uL Final    MPV 09/23/2020 10.2  9.2 - 12.9 fL Final    Immature Granulocytes 09/23/2020 0.4  0.0 - 0.5 % Final    Gran # (ANC)  09/23/2020 3.0  1.8 - 7.7 K/uL Final    Immature Grans (Abs) 09/23/2020 0.02  0.00 - 0.04 K/uL Final    Lymph # 09/23/2020 1.3  1.0 - 4.8 K/uL Final    Mono # 09/23/2020 0.5  0.3 - 1.0 K/uL Final    Eos # 09/23/2020 0.2  0.0 - 0.5 K/uL Final    Baso # 09/23/2020 0.05  0.00 - 0.20 K/uL Final    nRBC 09/23/2020 0  0 /100 WBC Final    Gran % 09/23/2020 59.9  38.0 - 73.0 % Final    Lymph % 09/23/2020 25.8  18.0 - 48.0 % Final    Mono % 09/23/2020 9.1  4.0 - 15.0 % Final    Eosinophil % 09/23/2020 3.8  0.0 - 8.0 % Final    Basophil % 09/23/2020 1.0  0.0 - 1.9 % Final    Differential Method 09/23/2020 Automated   Final    Glucose 09/23/2020 103  70 - 110 mg/dL Final    Sodium 09/23/2020 138  136 - 145 mmol/L Final    Potassium 09/23/2020 4.2  3.5 - 5.1 mmol/L Final    Chloride 09/23/2020 106  95 - 110 mmol/L Final    CO2 09/23/2020 23  23 - 29 mmol/L Final    BUN 09/23/2020 20  8 - 23 mg/dL Final    Calcium 09/23/2020 9.7  8.7 - 10.5 mg/dL Final    Creatinine 09/23/2020 1.3  0.5 - 1.4 mg/dL Final    Albumin 09/23/2020 3.9  3.5 - 5.2 g/dL Final    Phosphorus 09/23/2020 3.6  2.7 - 4.5 mg/dL Final    eGFR if  09/23/2020 46.0* >60 mL/min/1.73 m^2 Final    eGFR if non African American 09/23/2020 39.9* >60 mL/min/1.73 m^2 Final    Anion Gap 09/23/2020 9  8 - 16 mmol/L Final    RBC, UA 09/23/2020 1  0 - 4 /hpf Final    WBC, UA 09/23/2020 2  0 - 5 /hpf Final    Bacteria 09/23/2020 Negative  None-Occ /hpf Final    Squam Epithel, UA 09/23/2020 1  /hpf Final    Hyaline Casts, UA 09/23/2020 3* 0-1/lpf /lpf Final    Microscopic Comment 09/23/2020 SEE COMMENT   Final   Lab Visit on 09/08/2020   Component Date Value Ref Range Status    WBC 09/08/2020 4.71  3.90 - 12.70 K/uL Final    RBC 09/08/2020 4.21  4.00 - 5.40 M/uL Final    Hemoglobin 09/08/2020 12.9  12.0 - 16.0 g/dL Final    Hematocrit 09/08/2020 39.6  37.0 - 48.5 % Final    MCV 09/08/2020 94  82 - 98 fL Final    MCH 09/08/2020  30.6  27.0 - 31.0 pg Final    MCHC 09/08/2020 32.6  32.0 - 36.0 g/dL Final    RDW 09/08/2020 12.7  11.5 - 14.5 % Final    Platelets 09/08/2020 242  150 - 350 K/uL Final    MPV 09/08/2020 10.1  9.2 - 12.9 fL Final    Immature Granulocytes 09/08/2020 0.4  0.0 - 0.5 % Final    Gran # (ANC) 09/08/2020 2.6  1.8 - 7.7 K/uL Final    Immature Grans (Abs) 09/08/2020 0.02  0.00 - 0.04 K/uL Final    Lymph # 09/08/2020 1.4  1.0 - 4.8 K/uL Final    Mono # 09/08/2020 0.4  0.3 - 1.0 K/uL Final    Eos # 09/08/2020 0.2  0.0 - 0.5 K/uL Final    Baso # 09/08/2020 0.04  0.00 - 0.20 K/uL Final    nRBC 09/08/2020 0  0 /100 WBC Final    Gran % 09/08/2020 56.1  38.0 - 73.0 % Final    Lymph % 09/08/2020 29.1  18.0 - 48.0 % Final    Mono % 09/08/2020 8.5  4.0 - 15.0 % Final    Eosinophil % 09/08/2020 5.1  0.0 - 8.0 % Final    Basophil % 09/08/2020 0.8  0.0 - 1.9 % Final    Differential Method 09/08/2020 Automated   Final    Ferritin 09/08/2020 138  20.0 - 300.0 ng/mL Final   Lab Visit on 06/09/2020   Component Date Value Ref Range Status    Lithium Level 06/09/2020 1.0  0.6 - 1.2 mmol/L Final       Past Medical History:   Diagnosis Date    Glaucoma      Social History     Socioeconomic History    Marital status: Single     Spouse name: Not on file    Number of children: Not on file    Years of education: Not on file    Highest education level: Not on file   Occupational History    Not on file   Social Needs    Financial resource strain: Not on file    Food insecurity     Worry: Not on file     Inability: Not on file    Transportation needs     Medical: Not on file     Non-medical: Not on file   Tobacco Use    Smoking status: Never Smoker    Smokeless tobacco: Never Used   Substance and Sexual Activity    Alcohol use: No    Drug use: No    Sexual activity: Not on file   Lifestyle    Physical activity     Days per week: Not on file     Minutes per session: Not on file    Stress: Not on file   Relationships     Social connections     Talks on phone: Not on file     Gets together: Not on file     Attends Hinduism service: Not on file     Active member of club or organization: Not on file     Attends meetings of clubs or organizations: Not on file     Relationship status: Not on file   Other Topics Concern    Not on file   Social History Narrative    Not on file     Past Surgical History:   Procedure Laterality Date    CATARACT EXTRACTION  2016    HYSTERECTOMY       Family History   Problem Relation Age of Onset    Cancer Mother        Review of patient's allergies indicates:   Allergen Reactions    Codeine     Sulfa (sulfonamide antibiotics)     Sulfur Other (See Comments)       Current Outpatient Medications:     FLUoxetine 10 MG capsule, Take 1 capsule (10 mg total) by mouth once daily., Disp: 90 capsule, Rfl: 1    lisinopriL (PRINIVIL,ZESTRIL) 20 MG tablet, Take 1 tablet (20 mg total) by mouth once daily., Disp: 90 tablet, Rfl: 1    lithium carbonate 150 MG capsule, Take 150 mg by mouth 3 (three) times daily with meals., Disp: , Rfl:     LUMIGAN 0.01 % Drop, INSTILL 1 DROP BOTH EYES EVERY NIGHT, Disp: , Rfl:     calcium carbonate/vitamin D3 (CALCIUM 500 + D ORAL), Take 1 capsule by mouth 2 (two) times a day., Disp: , Rfl:     coenzyme Q10 (CO Q-10) 10 mg capsule, Take 10 mg by mouth once daily., Disp: , Rfl:     omeprazole (PRILOSEC) 20 MG capsule, Take 1 capsule (20 mg total) by mouth once daily., Disp: 30 capsule, Rfl: 5    Review of Systems   Constitutional: Negative for appetite change, chills, fatigue, fever and unexpected weight change.   HENT: Negative for congestion, ear pain, sinus pain, sore throat and trouble swallowing.    Eyes: Negative for pain, discharge and visual disturbance.   Respiratory: Negative for apnea, cough, shortness of breath and wheezing.    Cardiovascular: Negative for chest pain, palpitations and leg swelling.   Gastrointestinal: Negative for abdominal pain, blood in  "stool, constipation, diarrhea, nausea and vomiting.   Endocrine: Negative for heat intolerance, polydipsia and polyuria.   Genitourinary: Negative for difficulty urinating, dyspareunia, dysuria, frequency, hematuria and menstrual problem.   Musculoskeletal: Negative for arthralgias, back pain, gait problem, joint swelling and myalgias.   Allergic/Immunologic: Negative for environmental allergies, food allergies and immunocompromised state.   Neurological: Negative for dizziness, tremors, seizures, numbness and headaches.   Psychiatric/Behavioral: Negative for behavioral problems, confusion, hallucinations and suicidal ideas. The patient is not nervous/anxious.           Objective:      Vitals:    11/02/20 0919   BP: 134/88   Pulse: 68   Temp: 97.9 °F (36.6 °C)   Weight: 69.9 kg (154 lb)   Height: 5' 6" (1.676 m)     Physical Exam  Vitals signs and nursing note reviewed.   Constitutional:       Appearance: She is well-developed.   HENT:      Head: Normocephalic and atraumatic.      Right Ear: External ear normal.      Left Ear: External ear normal.      Nose: Nose normal.   Eyes:      Pupils: Pupils are equal, round, and reactive to light.   Neck:      Musculoskeletal: Normal range of motion and neck supple.      Thyroid: No thyromegaly.      Vascular: No carotid bruit.   Cardiovascular:      Rate and Rhythm: Normal rate and regular rhythm.      Heart sounds: Normal heart sounds. No murmur.   Pulmonary:      Effort: Pulmonary effort is normal.      Breath sounds: Normal breath sounds. No wheezing or rales.   Abdominal:      General: Bowel sounds are normal. There is no distension.      Palpations: Abdomen is soft.      Tenderness: There is no abdominal tenderness.   Musculoskeletal: Normal range of motion.         General: No tenderness or deformity.      Lumbar back: Normal. She exhibits no pain and no spasm.      Comments: Bends 90 degrees at  waist   Lymphadenopathy:      Cervical: No cervical adenopathy. "   Skin:     General: Skin is warm and dry.      Findings: No rash.   Neurological:      Mental Status: She is alert and oriented to person, place, and time.      Cranial Nerves: No cranial nerve deficit.      Coordination: Coordination normal.      Comments: No cogwheeling noted, walks with a normal gait   Psychiatric:         Behavior: Behavior normal.         Thought Content: Thought content normal.         Judgment: Judgment normal.           Assessment:       1. Parkinson disease    2. Gastroesophageal reflux disease without esophagitis    3. Essential hypertension    4. Menopause    5. High cholesterol    6. Stage 3a chronic kidney disease    7. Other hemochromatosis    8. Need for influenza vaccination    9. Bipolar disorder, in full remission, most recent episode depressed         Plan:       Parkinson disease  Patient doing very well on no medication at this time  Gastroesophageal reflux disease without esophagitis    Essential hypertension  -     lisinopriL (PRINIVIL,ZESTRIL) 20 MG tablet; Take 1 tablet (20 mg total) by mouth once daily.  Dispense: 90 tablet; Refill: 1  Stable on current regimen  Menopause    High cholesterol  -     Lipid Panel; Future; Expected date: 11/02/2020  Today's cholesterol high at 274 as she is off her cholesterol medication.  Will restart rosuvastatin daily  Stage 3a chronic kidney disease    Other hemochromatosis    Need for influenza vaccination  -     Influenza - Quadrivalent - High Dose (65+) (PF) (IM)  Flu vaccine today  Bipolar disorder, in full remission, most recent episode depressed  -     Lithium level; Future; Expected date: 11/02/2020  Lithium level low but patient under control.  Other orders  -     Lipid Panel      Follow up in about 6 months (around 5/2/2021), or parkinsons.        11/3/2020 Max Landeros

## 2020-11-03 PROBLEM — F31.76 BIPOLAR DISORDER, IN FULL REMISSION, MOST RECENT EPISODE DEPRESSED: Status: ACTIVE | Noted: 2020-11-03

## 2020-11-03 PROBLEM — Z78.0 MENOPAUSE: Status: ACTIVE | Noted: 2020-11-03

## 2020-11-03 LAB
CHOLEST SERPL-MCNC: 274 MG/DL
CHOLEST/HDLC SERPL: 4.9 (CALC)
HDLC SERPL-MCNC: 56 MG/DL
LDLC SERPL CALC-MCNC: 189 MG/DL (CALC)
LITHIUM SERPL-SCNC: 0.5 MMOL/L (ref 0.6–1.2)
NONHDLC SERPL-MCNC: 218 MG/DL (CALC)
TRIGL SERPL-MCNC: 146 MG/DL

## 2020-11-03 RX ORDER — ROSUVASTATIN CALCIUM 10 MG/1
10 TABLET, COATED ORAL DAILY
Qty: 90 TABLET | Refills: 1 | Status: SHIPPED | OUTPATIENT
Start: 2020-11-03 | End: 2021-05-03 | Stop reason: SDUPTHER

## 2020-11-21 ENCOUNTER — OFFICE VISIT (OUTPATIENT)
Dept: URGENT CARE | Facility: CLINIC | Age: 75
End: 2020-11-21
Payer: COMMERCIAL

## 2020-11-21 VITALS
DIASTOLIC BLOOD PRESSURE: 65 MMHG | BODY MASS INDEX: 24.91 KG/M2 | TEMPERATURE: 98 F | OXYGEN SATURATION: 100 % | WEIGHT: 155 LBS | HEIGHT: 66 IN | SYSTOLIC BLOOD PRESSURE: 137 MMHG | HEART RATE: 67 BPM

## 2020-11-21 DIAGNOSIS — N30.01 ACUTE CYSTITIS WITH HEMATURIA: Primary | ICD-10-CM

## 2020-11-21 DIAGNOSIS — R30.0 DYSURIA: ICD-10-CM

## 2020-11-21 LAB
BILIRUB UR QL STRIP: NEGATIVE
GLUCOSE UR QL STRIP: NEGATIVE
KETONES UR QL STRIP: NEGATIVE
LEUKOCYTE ESTERASE UR QL STRIP: POSITIVE
PH, POC UA: 5
POC BLOOD, URINE: POSITIVE
POC NITRATES, URINE: NEGATIVE
PROT UR QL STRIP: POSITIVE
SP GR UR STRIP: 1.02 (ref 1–1.03)
UROBILINOGEN UR STRIP-ACNC: NORMAL (ref 0.1–1.1)

## 2020-11-21 PROCEDURE — 81003 URINALYSIS AUTO W/O SCOPE: CPT | Mod: QW,S$GLB,, | Performed by: NURSE PRACTITIONER

## 2020-11-21 PROCEDURE — 81003 POCT URINALYSIS, DIPSTICK, AUTOMATED, W/O SCOPE: ICD-10-PCS | Mod: QW,S$GLB,, | Performed by: NURSE PRACTITIONER

## 2020-11-21 PROCEDURE — 99204 OFFICE O/P NEW MOD 45 MIN: CPT | Mod: 25,S$GLB,, | Performed by: NURSE PRACTITIONER

## 2020-11-21 PROCEDURE — 99204 PR OFFICE/OUTPT VISIT, NEW, LEVL IV, 45-59 MIN: ICD-10-PCS | Mod: 25,S$GLB,, | Performed by: NURSE PRACTITIONER

## 2020-11-21 RX ORDER — CEPHALEXIN 500 MG/1
500 CAPSULE ORAL EVERY 12 HOURS
Qty: 14 CAPSULE | Refills: 0 | Status: SHIPPED | OUTPATIENT
Start: 2020-11-21 | End: 2020-11-22 | Stop reason: SDUPTHER

## 2020-11-21 NOTE — PROGRESS NOTES
"Subjective:       Patient ID: Payton Gutierrez is a 75 y.o. female.    Vitals:  height is 5' 6" (1.676 m) and weight is 70.3 kg (155 lb). Her oral temperature is 97.5 °F (36.4 °C). Her blood pressure is 137/65 and her pulse is 67. Her oxygen saturation is 100%.     Chief Complaint: Dysuria    Pt states "urine urgency/painful urination/says she is also feeling a little irritation from her belly button to her breast bone when she urinates. X 3 days."      Constitution: Negative for chills and fever.   Neck: Negative for painful lymph nodes.   Gastrointestinal: Negative for abdominal pain, nausea and vomiting.   Genitourinary: Positive for dysuria, frequency and urgency. Negative for urine decreased, hematuria, history of kidney stones, painful menstruation, irregular menstruation, missed menses, heavy menstrual bleeding, ovarian cysts, genital trauma, vaginal pain, vaginal discharge, vaginal bleeding, vaginal odor, painful intercourse, genital sore, painful ejaculation and pelvic pain.   Musculoskeletal: Negative for back pain.   Skin: Negative for rash and lesion.   Hematologic/Lymphatic: Negative for swollen lymph nodes.       Objective:      Physical Exam   Constitutional: She is oriented to person, place, and time. She appears well-developed.   HENT:   Head: Normocephalic and atraumatic.   Ears:   Right Ear: External ear normal.   Left Ear: External ear normal.   Nose: Nose normal. No nasal deformity. No epistaxis.   Mouth/Throat: Oropharynx is clear and moist and mucous membranes are normal.   Eyes: Lids are normal.   Neck: Trachea normal, normal range of motion and phonation normal. Neck supple.   Cardiovascular: Normal pulses.   Pulmonary/Chest: Effort normal.   Abdominal: Soft. Normal appearance and bowel sounds are normal. She exhibits no distension. There is no abdominal tenderness.   Neurological: She is alert and oriented to person, place, and time.   Skin: Skin is warm, dry and intact. Psychiatric: Her " speech is normal and behavior is normal.   Nursing note and vitals reviewed.        Assessment:       1. Acute cystitis with hematuria    2. Dysuria        Plan:         Acute cystitis with hematuria    Dysuria  -     POCT Urinalysis, Dipstick, Automated, W/O Scope  -     Urine culture    Other orders  -     cephALEXin (KEFLEX) 500 MG capsule; Take 1 capsule (500 mg total) by mouth every 12 (twelve) hours. for 7 days  Dispense: 14 capsule; Refill: 0

## 2020-11-21 NOTE — PATIENT INSTRUCTIONS
Understanding Urinary Tract Infections (UTIs)  Most UTIs are caused by bacteria, although they may also be caused by viruses or fungi. Bacteria from the bowel are the most common source of infection. The infection may start because of any of the following:  · Sexual activity. During sex, bacteria can travel from the penis, vagina, or rectum into the urethra.   · Bacteria on the skin outside the rectum may travel into the urethra. This is more common in women since the rectum and urethra are closer to each other than in men. Wiping from front to back after using the toilet and keeping the area clean can help prevent germs from getting to the urethra.  · Blockage of urine flow through the urinary tract. If urine sits too long, germs may start to grow out of control.      Parts of the urinary tract  The infection can occur in any part of the urinary tract.  · The kidneys collect and store urine.  · The ureters carry urine from the kidneys to the bladder.  · The bladder holds urine until you are ready to let it out.  · The urethra carries urine from the bladder out of the body. It is shorter in women, so bacteria can move through it more easily. The urethra is longer in men, so a UTI is less likely to reach the bladder or kidneys in men.  Date Last Reviewed: 1/1/2017  © 6070-8567 [employer]. [address] [place] [address], [place] [address]. All rights reserved. This information is not intended as a substitute for professional medical care. Always follow your healthcare professional's instructions.        Dysuria     Painful urination (dysuria) is often caused by a problem in the urinary tract.   Dysuria is pain felt during urination. It is often described as a burning. Learn more about this problem and how it can be treated.  What causes dysuria?  Possible causes include:  · Infection with a bacteria or virus such as a urinary tract infection (UTI or a sexually transmitted infection (STI)  · Sensitivity or  "allergy to chemicals such as those found in lotions and other products  · Prostate or bladder problems  · Radiation therapy to the pelvic area  How is dysuria diagnosed?  Your healthcare provider will examine you. He or she will ask about your symptoms and health. After talking with you and doing a physical exam, your healthcare provider may know what is causing your dysuria. He or she will usually request  a sample of your urine. Tests of your urine, or a "urinalysis," are done. A urinalysis may include:  · Looking at the urine sample (visual exam)  · Checking for substances (chemical exam)  · Looking at a small amount under a microscope (microscopic exam)  Some parts of the urinalysis may be done in the provider's office and some in a lab. And, the urine sample may be checked for bacteria and yeast (urine culture). Your healthcare provider will tell you more about these tests if they are needed.  How is dysuria treated?  Treatment depends on the cause. If you have a bacterial infection, you may need antibiotics. You may be given medicines to make it easier for you to urinate and help relieve pain. Your healthcare provider can tell you more about your treatment options. Untreated, symptoms may get worse.  When to call your healthcare provider  Call the healthcare provider right away if you have any of the following:  · Fever of 100.4°F (38°C) or higher   · No improvement after three days of treatment  · Trouble urinating because of pain  · New or increased discharge from the vagina or penis  · Rash or joint pain  · Increased back or abdominal pain  · Enlarged painful lymph nodes (lumps) in the groin   Date Last Reviewed: 1/1/2017 © 2000-2017 [employer]. [address] [place] [address], [place] [address]. All rights reserved. This information is not intended as a substitute for professional medical care. Always follow your healthcare professional's instructions.        Blood in the Urine    Blood in the urine " (hematuria) has many possible causes. If it occurs after an injury (such as a car accident or fall), it is most often a sign of bruising to the kidney or bladder. Common causes of blood in the urine include urinary tract infections, kidney stones, inflammation, tumors, or certain other diseases of the kidney or bladder. Menstruation can cause blood to appear in the urine sample, although it is not coming from the urinary tract.  If only a trace amount of blood is present, it will show up on the urine test, even though the urine may be yellow and not pink or red. This may occur with any of the above conditions, as well as heavy exercise or high fever. In this case, your doctor may want to repeat the urine test on another day. This will show if the blood is still present. If it is, then other tests can be done to find out the cause.  Home care  Follow these home care guidelines:  · If your urine does not appear bloody (pink, brown or red) then you do not need to restrict your activity in any way.  · If you can see blood in your urine, rest and avoid heavy exertion until your next exam. Do not use aspirin, blood thinners, or anti-platelet or anti-inflammatory medicines. These include ibuprofen and naproxen. These thin the blood and may increase bleeding.  Follow-up care  Follow up with your healthcare provider, or as advised. If you were injured and had blood in your urine, you should have a repeat urine test in 1 to 2 days. Contact your doctor for this test.  A radiologist will review any X-rays that were taken. You will be told of any new findings that may affect your care.  When to seek medical advice  Call your healthcare provider right away if any of these occur:  · Bright red blood or blood clots in the urine (if you did not have this before)  · Weakness, dizziness or fainting  · New groin, abdominal, or back pain  · Fever of 100.4ºF (38ºC) or higher, or as directed by your healthcare provider  · Repeated  vomiting  · Bleeding from the nose or gums or easy bruising  Date Last Reviewed: 9/1/2016  © 6652-1031 [employer]. [address] [place] [address], [place] [address]. All rights reserved. This information is not intended as a substitute for professional medical care. Always follow your healthcare professional's instructions.

## 2020-11-22 RX ORDER — CEPHALEXIN 500 MG/1
500 CAPSULE ORAL EVERY 12 HOURS
Qty: 14 CAPSULE | Refills: 0 | Status: SHIPPED | OUTPATIENT
Start: 2020-11-22 | End: 2020-11-29

## 2020-11-25 DIAGNOSIS — Z12.31 ENCOUNTER FOR SCREENING MAMMOGRAM FOR MALIGNANT NEOPLASM OF BREAST: Primary | ICD-10-CM

## 2020-11-28 LAB
BACTERIA UR CULT: ABNORMAL
BACTERIA UR CULT: ABNORMAL
OTHER ANTIBIOTIC SUSC ISLT: ABNORMAL

## 2020-11-29 ENCOUNTER — TELEPHONE (OUTPATIENT)
Dept: URGENT CARE | Facility: CLINIC | Age: 75
End: 2020-11-29

## 2020-11-29 NOTE — TELEPHONE ENCOUNTER
----- Message from JOO Strange sent at 11/28/2020 10:52 AM CST -----  Culture results positive, prescribed keflex which is appropriate. Please call to notify patient

## 2020-11-30 RX ORDER — NITROFURANTOIN 25; 75 MG/1; MG/1
100 CAPSULE ORAL 2 TIMES DAILY
Qty: 14 CAPSULE | Refills: 0 | Status: SHIPPED | OUTPATIENT
Start: 2020-11-30 | End: 2021-05-03

## 2020-11-30 NOTE — TELEPHONE ENCOUNTER
----- Message from Robin Britt sent at 11/30/2020  2:46 PM CST -----  Regarding: needing call back  Pt went to urgent care a week ago  Saturday with uti and you pt still has it   Pt [phone]

## 2020-11-30 NOTE — TELEPHONE ENCOUNTER
Please call pt and see what sx she is currently having. May need another urine. Keflex should have taken care of infection. Will have to ask  if he wants to send in something else or get another sample. But see what sx she is having first.

## 2020-11-30 NOTE — TELEPHONE ENCOUNTER
"Spoke with pt, states she is 80% better but now she is "stinging" when she goes to the bathroom.    Printed   "

## 2020-12-11 ENCOUNTER — TELEPHONE (OUTPATIENT)
Dept: FAMILY MEDICINE | Facility: CLINIC | Age: 75
End: 2020-12-11
Payer: COMMERCIAL

## 2020-12-11 DIAGNOSIS — R31.9 HEMATURIA, UNSPECIFIED TYPE: Primary | ICD-10-CM

## 2020-12-11 LAB
BILIRUB UR QL STRIP: NEGATIVE
GLUCOSE UR QL STRIP: NEGATIVE
KETONES UR QL STRIP: NEGATIVE
LEUKOCYTE ESTERASE UR QL STRIP: NEGATIVE
PH, POC UA: 5.5
POC BLOOD, URINE: POSITIVE
POC NITRATES, URINE: NEGATIVE
PROT UR QL STRIP: NEGATIVE
SP GR UR STRIP: 1.03 (ref 1–1.03)
UROBILINOGEN UR STRIP-ACNC: 0.2 (ref 0.1–1.1)

## 2020-12-11 PROCEDURE — 81003 URINALYSIS AUTO W/O SCOPE: CPT | Mod: QW,S$GLB,, | Performed by: FAMILY MEDICINE

## 2020-12-11 PROCEDURE — 81003 POCT URINALYSIS, DIPSTICK, AUTOMATED, W/O SCOPE: ICD-10-PCS | Mod: QW,S$GLB,, | Performed by: FAMILY MEDICINE

## 2020-12-11 NOTE — TELEPHONE ENCOUNTER
"Pt dropped off a urine sample - states she had a UTI weeks ago and she "feels" better but states last time she had an infection it lasted over a month so she just wanted to be sure.   Dip U/A shows 1+ blood.  "

## 2020-12-11 NOTE — TELEPHONE ENCOUNTER
Should be fine if she is not having any other symptoms. Looks like she had Keflex at UC and then a round of Macrobid as well. We can culture it if she wants but considering how much abx she just got, would not treat again unless culture grew out.

## 2020-12-11 NOTE — TELEPHONE ENCOUNTER
Spoke with pt, states she would rather not taking anything else but wants a culture sent and will take something else if she has to.

## 2020-12-14 ENCOUNTER — HOSPITAL ENCOUNTER (OUTPATIENT)
Dept: RADIOLOGY | Facility: HOSPITAL | Age: 75
Discharge: HOME | End: 2020-12-14
Attending: FAMILY MEDICINE
Payer: COMMERCIAL

## 2020-12-14 DIAGNOSIS — Z12.31 ENCOUNTER FOR SCREENING MAMMOGRAM FOR MALIGNANT NEOPLASM OF BREAST: ICD-10-CM

## 2020-12-14 PROCEDURE — 77067 SCR MAMMO BI INCL CAD: CPT | Mod: TC,PO

## 2020-12-21 ENCOUNTER — TELEPHONE (OUTPATIENT)
Dept: FAMILY MEDICINE | Facility: CLINIC | Age: 75
End: 2020-12-21

## 2020-12-21 NOTE — TELEPHONE ENCOUNTER
----- Message from Max Landeros MD sent at 12/20/2020  6:11 PM CST -----  Call patient.  Mammogram was normal.  Repeat mammogram in 1 year.

## 2021-01-12 ENCOUNTER — TELEPHONE (OUTPATIENT)
Dept: FAMILY MEDICINE | Facility: CLINIC | Age: 76
End: 2021-01-12

## 2021-02-13 ENCOUNTER — IMMUNIZATION (OUTPATIENT)
Dept: PRIMARY CARE CLINIC | Facility: CLINIC | Age: 76
End: 2021-02-13
Payer: COMMERCIAL

## 2021-02-13 DIAGNOSIS — Z23 NEED FOR VACCINATION: Primary | ICD-10-CM

## 2021-02-13 PROCEDURE — 0001A COVID-19, MRNA, LNP-S, PF, 30 MCG/0.3 ML DOSE VACCINE: ICD-10-PCS | Mod: S$GLB,,, | Performed by: FAMILY MEDICINE

## 2021-02-13 PROCEDURE — 91300 COVID-19, MRNA, LNP-S, PF, 30 MCG/0.3 ML DOSE VACCINE: CPT | Mod: S$GLB,,, | Performed by: FAMILY MEDICINE

## 2021-02-13 PROCEDURE — 91300 COVID-19, MRNA, LNP-S, PF, 30 MCG/0.3 ML DOSE VACCINE: ICD-10-PCS | Mod: S$GLB,,, | Performed by: FAMILY MEDICINE

## 2021-02-13 PROCEDURE — 0001A COVID-19, MRNA, LNP-S, PF, 30 MCG/0.3 ML DOSE VACCINE: CPT | Mod: S$GLB,,, | Performed by: FAMILY MEDICINE

## 2021-03-06 ENCOUNTER — IMMUNIZATION (OUTPATIENT)
Dept: PRIMARY CARE CLINIC | Facility: CLINIC | Age: 76
End: 2021-03-06
Payer: COMMERCIAL

## 2021-03-06 DIAGNOSIS — Z23 NEED FOR VACCINATION: Primary | ICD-10-CM

## 2021-03-06 PROCEDURE — 91300 COVID-19, MRNA, LNP-S, PF, 30 MCG/0.3 ML DOSE VACCINE: ICD-10-PCS | Mod: S$GLB,,, | Performed by: FAMILY MEDICINE

## 2021-03-06 PROCEDURE — 0002A COVID-19, MRNA, LNP-S, PF, 30 MCG/0.3 ML DOSE VACCINE: CPT | Mod: CV19,S$GLB,, | Performed by: FAMILY MEDICINE

## 2021-03-06 PROCEDURE — 91300 COVID-19, MRNA, LNP-S, PF, 30 MCG/0.3 ML DOSE VACCINE: CPT | Mod: S$GLB,,, | Performed by: FAMILY MEDICINE

## 2021-03-06 PROCEDURE — 0002A COVID-19, MRNA, LNP-S, PF, 30 MCG/0.3 ML DOSE VACCINE: ICD-10-PCS | Mod: CV19,S$GLB,, | Performed by: FAMILY MEDICINE

## 2021-03-08 ENCOUNTER — LAB VISIT (OUTPATIENT)
Dept: LAB | Facility: HOSPITAL | Age: 76
End: 2021-03-08
Attending: INTERNAL MEDICINE
Payer: COMMERCIAL

## 2021-03-08 DIAGNOSIS — E83.118 OTHER HEMOCHROMATOSIS: ICD-10-CM

## 2021-03-08 LAB
BASOPHILS # BLD AUTO: 0.06 K/UL (ref 0–0.2)
BASOPHILS NFR BLD: 1.4 % (ref 0–1.9)
DIFFERENTIAL METHOD: NORMAL
EOSINOPHIL # BLD AUTO: 0.2 K/UL (ref 0–0.5)
EOSINOPHIL NFR BLD: 4.1 % (ref 0–8)
ERYTHROCYTE [DISTWIDTH] IN BLOOD BY AUTOMATED COUNT: 13 % (ref 11.5–14.5)
FERRITIN SERPL-MCNC: 132 NG/ML (ref 20–300)
HCT VFR BLD AUTO: 41.5 % (ref 37–48.5)
HGB BLD-MCNC: 13.6 G/DL (ref 12–16)
IMM GRANULOCYTES # BLD AUTO: 0.01 K/UL (ref 0–0.04)
IMM GRANULOCYTES NFR BLD AUTO: 0.2 % (ref 0–0.5)
LYMPHOCYTES # BLD AUTO: 1.1 K/UL (ref 1–4.8)
LYMPHOCYTES NFR BLD: 25.1 % (ref 18–48)
MCH RBC QN AUTO: 30.2 PG (ref 27–31)
MCHC RBC AUTO-ENTMCNC: 32.8 G/DL (ref 32–36)
MCV RBC AUTO: 92 FL (ref 82–98)
MONOCYTES # BLD AUTO: 0.5 K/UL (ref 0.3–1)
MONOCYTES NFR BLD: 12.2 % (ref 4–15)
NEUTROPHILS # BLD AUTO: 2.4 K/UL (ref 1.8–7.7)
NEUTROPHILS NFR BLD: 57 % (ref 38–73)
NRBC BLD-RTO: 0 /100 WBC
PLATELET # BLD AUTO: 226 K/UL (ref 150–350)
PMV BLD AUTO: 10.1 FL (ref 9.2–12.9)
RBC # BLD AUTO: 4.51 M/UL (ref 4–5.4)
WBC # BLD AUTO: 4.18 K/UL (ref 3.9–12.7)

## 2021-03-08 PROCEDURE — 36415 COLL VENOUS BLD VENIPUNCTURE: CPT | Performed by: INTERNAL MEDICINE

## 2021-03-08 PROCEDURE — 85025 COMPLETE CBC W/AUTO DIFF WBC: CPT | Performed by: INTERNAL MEDICINE

## 2021-03-08 PROCEDURE — 82728 ASSAY OF FERRITIN: CPT | Performed by: INTERNAL MEDICINE

## 2021-04-15 ENCOUNTER — TELEPHONE (OUTPATIENT)
Dept: FAMILY MEDICINE | Facility: CLINIC | Age: 76
End: 2021-04-15

## 2021-04-15 DIAGNOSIS — I10 ESSENTIAL HYPERTENSION: ICD-10-CM

## 2021-04-15 DIAGNOSIS — Z00.00 ROUTINE GENERAL MEDICAL EXAMINATION AT A HEALTH CARE FACILITY: Primary | ICD-10-CM

## 2021-04-15 DIAGNOSIS — Z79.899 ENCOUNTER FOR LONG-TERM (CURRENT) USE OF OTHER MEDICATIONS: ICD-10-CM

## 2021-04-15 DIAGNOSIS — E78.00 HIGH CHOLESTEROL: ICD-10-CM

## 2021-04-16 ENCOUNTER — LAB VISIT (OUTPATIENT)
Dept: LAB | Facility: HOSPITAL | Age: 76
End: 2021-04-16
Payer: COMMERCIAL

## 2021-04-16 DIAGNOSIS — F31.30 BIPOLAR I DISORDER, MOST RECENT EPISODE DEPRESSED: Primary | ICD-10-CM

## 2021-04-16 DIAGNOSIS — F33.1 MAJOR DEPRESSIVE DISORDER, RECURRENT EPISODE, MODERATE: ICD-10-CM

## 2021-04-16 LAB
LITHIUM SERPL-SCNC: 0.5 MMOL/L (ref 0.6–1.2)
TSH SERPL DL<=0.005 MIU/L-ACNC: 1.49 UIU/ML (ref 0.34–5.6)

## 2021-04-16 PROCEDURE — 80178 ASSAY OF LITHIUM: CPT

## 2021-04-16 PROCEDURE — 36415 COLL VENOUS BLD VENIPUNCTURE: CPT

## 2021-04-16 PROCEDURE — 84443 ASSAY THYROID STIM HORMONE: CPT

## 2021-04-29 ENCOUNTER — TELEPHONE (OUTPATIENT)
Dept: FAMILY MEDICINE | Facility: CLINIC | Age: 76
End: 2021-04-29

## 2021-05-01 LAB
ALBUMIN SERPL-MCNC: 4.4 G/DL (ref 3.6–5.1)
ALBUMIN/CREAT UR: 8 MCG/MG CREAT
ALBUMIN/GLOB SERPL: 1.7 (CALC) (ref 1–2.5)
ALP SERPL-CCNC: 117 U/L (ref 37–153)
ALT SERPL-CCNC: 11 U/L (ref 6–29)
APPEARANCE UR: CLEAR
AST SERPL-CCNC: 14 U/L (ref 10–35)
BACTERIA #/AREA URNS HPF: ABNORMAL /HPF
BACTERIA UR CULT: ABNORMAL
BASOPHILS # BLD AUTO: 62 CELLS/UL (ref 0–200)
BASOPHILS NFR BLD AUTO: 1.2 %
BILIRUB SERPL-MCNC: 1 MG/DL (ref 0.2–1.2)
BILIRUB UR QL STRIP: NEGATIVE
BUN SERPL-MCNC: 18 MG/DL (ref 7–25)
BUN/CREAT SERPL: 15 (CALC) (ref 6–22)
CALCIUM SERPL-MCNC: 10.3 MG/DL (ref 8.6–10.4)
CHLORIDE SERPL-SCNC: 104 MMOL/L (ref 98–110)
CHOLEST SERPL-MCNC: 187 MG/DL
CHOLEST/HDLC SERPL: 2.9 (CALC)
CO2 SERPL-SCNC: 25 MMOL/L (ref 20–32)
COLOR UR: YELLOW
CREAT SERPL-MCNC: 1.23 MG/DL (ref 0.6–0.93)
CREAT UR-MCNC: 107 MG/DL (ref 20–275)
EOSINOPHIL # BLD AUTO: 172 CELLS/UL (ref 15–500)
EOSINOPHIL NFR BLD AUTO: 3.3 %
ERYTHROCYTE [DISTWIDTH] IN BLOOD BY AUTOMATED COUNT: 13 % (ref 11–15)
GLOBULIN SER CALC-MCNC: 2.6 G/DL (CALC) (ref 1.9–3.7)
GLUCOSE SERPL-MCNC: 98 MG/DL (ref 65–99)
GLUCOSE UR QL STRIP: NEGATIVE
HCT VFR BLD AUTO: 39.6 % (ref 35–45)
HDLC SERPL-MCNC: 64 MG/DL
HGB BLD-MCNC: 13.5 G/DL (ref 11.7–15.5)
HGB UR QL STRIP: ABNORMAL
HYALINE CASTS #/AREA URNS LPF: ABNORMAL /LPF
KETONES UR QL STRIP: NEGATIVE
LDLC SERPL CALC-MCNC: 100 MG/DL (CALC)
LEUKOCYTE ESTERASE UR QL STRIP: NEGATIVE
LYMPHOCYTES # BLD AUTO: 1378 CELLS/UL (ref 850–3900)
LYMPHOCYTES NFR BLD AUTO: 26.5 %
MCH RBC QN AUTO: 30.3 PG (ref 27–33)
MCHC RBC AUTO-ENTMCNC: 34.1 G/DL (ref 32–36)
MCV RBC AUTO: 88.8 FL (ref 80–100)
MICROALBUMIN UR-MCNC: 0.9 MG/DL
MONOCYTES # BLD AUTO: 364 CELLS/UL (ref 200–950)
MONOCYTES NFR BLD AUTO: 7 %
NEUTROPHILS # BLD AUTO: 3224 CELLS/UL (ref 1500–7800)
NEUTROPHILS NFR BLD AUTO: 62 %
NITRITE UR QL STRIP: NEGATIVE
NONHDLC SERPL-MCNC: 123 MG/DL (CALC)
PH UR STRIP: ABNORMAL [PH] (ref 5–8)
PLATELET # BLD AUTO: 206 THOUSAND/UL (ref 140–400)
PMV BLD REES-ECKER: 10.6 FL (ref 7.5–12.5)
POTASSIUM SERPL-SCNC: 4.1 MMOL/L (ref 3.5–5.3)
PROT SERPL-MCNC: 7 G/DL (ref 6.1–8.1)
PROT UR QL STRIP: NEGATIVE
RBC # BLD AUTO: 4.46 MILLION/UL (ref 3.8–5.1)
RBC #/AREA URNS HPF: ABNORMAL /HPF
SODIUM SERPL-SCNC: 139 MMOL/L (ref 135–146)
SP GR UR STRIP: 1.01 (ref 1–1.03)
SQUAMOUS #/AREA URNS HPF: ABNORMAL /HPF
TRIGL SERPL-MCNC: 135 MG/DL
TSH SERPL-ACNC: 1.54 MIU/L (ref 0.4–4.5)
WBC # BLD AUTO: 5.2 THOUSAND/UL (ref 3.8–10.8)
WBC #/AREA URNS HPF: ABNORMAL /HPF

## 2021-05-03 ENCOUNTER — OFFICE VISIT (OUTPATIENT)
Dept: FAMILY MEDICINE | Facility: CLINIC | Age: 76
End: 2021-05-03
Payer: COMMERCIAL

## 2021-05-03 VITALS
BODY MASS INDEX: 24.91 KG/M2 | HEART RATE: 76 BPM | HEIGHT: 66 IN | DIASTOLIC BLOOD PRESSURE: 86 MMHG | WEIGHT: 155 LBS | SYSTOLIC BLOOD PRESSURE: 124 MMHG

## 2021-05-03 DIAGNOSIS — K21.9 GASTROESOPHAGEAL REFLUX DISEASE WITHOUT ESOPHAGITIS: ICD-10-CM

## 2021-05-03 DIAGNOSIS — F31.76 BIPOLAR DISORDER, IN FULL REMISSION, MOST RECENT EPISODE DEPRESSED: ICD-10-CM

## 2021-05-03 DIAGNOSIS — G20.A1 PARKINSON DISEASE: Primary | ICD-10-CM

## 2021-05-03 DIAGNOSIS — N18.31 STAGE 3A CHRONIC KIDNEY DISEASE: ICD-10-CM

## 2021-05-03 DIAGNOSIS — I10 ESSENTIAL HYPERTENSION: ICD-10-CM

## 2021-05-03 DIAGNOSIS — Z78.0 MENOPAUSE: ICD-10-CM

## 2021-05-03 DIAGNOSIS — E83.118 OTHER HEMOCHROMATOSIS: ICD-10-CM

## 2021-05-03 DIAGNOSIS — E78.00 HIGH CHOLESTEROL: ICD-10-CM

## 2021-05-03 PROCEDURE — 3079F DIAST BP 80-89 MM HG: CPT | Mod: S$GLB,,, | Performed by: FAMILY MEDICINE

## 2021-05-03 PROCEDURE — 1159F MED LIST DOCD IN RCRD: CPT | Mod: S$GLB,,, | Performed by: FAMILY MEDICINE

## 2021-05-03 PROCEDURE — 3074F SYST BP LT 130 MM HG: CPT | Mod: S$GLB,,, | Performed by: FAMILY MEDICINE

## 2021-05-03 PROCEDURE — 1101F PR PT FALLS ASSESS DOC 0-1 FALLS W/OUT INJ PAST YR: ICD-10-PCS | Mod: S$GLB,,, | Performed by: FAMILY MEDICINE

## 2021-05-03 PROCEDURE — 3074F PR MOST RECENT SYSTOLIC BLOOD PRESSURE < 130 MM HG: ICD-10-PCS | Mod: S$GLB,,, | Performed by: FAMILY MEDICINE

## 2021-05-03 PROCEDURE — 3288F FALL RISK ASSESSMENT DOCD: CPT | Mod: S$GLB,,, | Performed by: FAMILY MEDICINE

## 2021-05-03 PROCEDURE — 1101F PT FALLS ASSESS-DOCD LE1/YR: CPT | Mod: S$GLB,,, | Performed by: FAMILY MEDICINE

## 2021-05-03 PROCEDURE — 3288F PR FALLS RISK ASSESSMENT DOCUMENTED: ICD-10-PCS | Mod: S$GLB,,, | Performed by: FAMILY MEDICINE

## 2021-05-03 PROCEDURE — 99214 PR OFFICE/OUTPT VISIT, EST, LEVL IV, 30-39 MIN: ICD-10-PCS | Mod: S$GLB,,, | Performed by: FAMILY MEDICINE

## 2021-05-03 PROCEDURE — 3079F PR MOST RECENT DIASTOLIC BLOOD PRESSURE 80-89 MM HG: ICD-10-PCS | Mod: S$GLB,,, | Performed by: FAMILY MEDICINE

## 2021-05-03 PROCEDURE — 1159F PR MEDICATION LIST DOCUMENTED IN MEDICAL RECORD: ICD-10-PCS | Mod: S$GLB,,, | Performed by: FAMILY MEDICINE

## 2021-05-03 PROCEDURE — 99214 OFFICE O/P EST MOD 30 MIN: CPT | Mod: S$GLB,,, | Performed by: FAMILY MEDICINE

## 2021-05-03 RX ORDER — ROSUVASTATIN CALCIUM 10 MG/1
10 TABLET, COATED ORAL DAILY
Qty: 90 TABLET | Refills: 1 | Status: SHIPPED | OUTPATIENT
Start: 2021-05-03 | End: 2021-11-04 | Stop reason: SDUPTHER

## 2021-05-03 RX ORDER — LISINOPRIL 20 MG/1
20 TABLET ORAL DAILY
Qty: 90 TABLET | Refills: 1 | Status: SHIPPED | OUTPATIENT
Start: 2021-05-03 | End: 2021-11-04 | Stop reason: SDUPTHER

## 2021-05-03 RX ORDER — FLUOXETINE 10 MG/1
10 CAPSULE ORAL DAILY
Qty: 90 CAPSULE | Refills: 1 | Status: CANCELLED | OUTPATIENT
Start: 2021-05-03

## 2021-06-22 ENCOUNTER — LAB VISIT (OUTPATIENT)
Dept: LAB | Facility: HOSPITAL | Age: 76
End: 2021-06-22
Attending: INTERNAL MEDICINE
Payer: COMMERCIAL

## 2021-06-22 DIAGNOSIS — E55.9 AVITAMINOSIS D: ICD-10-CM

## 2021-06-22 DIAGNOSIS — N18.30 CHRONIC KIDNEY DISEASE, STAGE III (MODERATE): Primary | ICD-10-CM

## 2021-06-22 LAB
25(OH)D3+25(OH)D2 SERPL-MCNC: 53 NG/ML (ref 30–96)
ALBUMIN SERPL BCP-MCNC: 4.3 G/DL (ref 3.5–5.2)
ANION GAP SERPL CALC-SCNC: 11 MMOL/L (ref 8–16)
BACTERIA #/AREA URNS HPF: NEGATIVE /HPF
BILIRUB UR QL STRIP: NEGATIVE
BUN SERPL-MCNC: 28 MG/DL (ref 8–23)
CALCIUM SERPL-MCNC: 9.9 MG/DL (ref 8.7–10.5)
CHLORIDE SERPL-SCNC: 105 MMOL/L (ref 95–110)
CLARITY UR: CLEAR
CO2 SERPL-SCNC: 25 MMOL/L (ref 23–29)
COLOR UR: YELLOW
CREAT SERPL-MCNC: 1.2 MG/DL (ref 0.5–1.4)
CREAT UR-MCNC: 141 MG/DL (ref 15–325)
EST. GFR  (AFRICAN AMERICAN): 50.4 ML/MIN/1.73 M^2
EST. GFR  (NON AFRICAN AMERICAN): 43.7 ML/MIN/1.73 M^2
GLUCOSE SERPL-MCNC: 105 MG/DL (ref 70–110)
GLUCOSE UR QL STRIP: NEGATIVE
HGB UR QL STRIP: ABNORMAL
HYALINE CASTS #/AREA URNS LPF: 3 /LPF
KETONES UR QL STRIP: NEGATIVE
LEUKOCYTE ESTERASE UR QL STRIP: NEGATIVE
MICROSCOPIC COMMENT: ABNORMAL
NITRITE UR QL STRIP: NEGATIVE
PH UR STRIP: 5 [PH] (ref 5–8)
PHOSPHATE SERPL-MCNC: 3.8 MG/DL (ref 2.7–4.5)
POTASSIUM SERPL-SCNC: 4 MMOL/L (ref 3.5–5.1)
PROT UR QL STRIP: NEGATIVE
PROT UR-MCNC: 11 MG/DL (ref 6–15)
PROT/CREAT UR: 0.08 MG/G{CREAT} (ref 0–0.2)
RBC #/AREA URNS HPF: 1 /HPF (ref 0–4)
SODIUM SERPL-SCNC: 141 MMOL/L (ref 136–145)
SP GR UR STRIP: 1.02 (ref 1–1.03)
SQUAMOUS #/AREA URNS HPF: 0 /HPF
URN SPEC COLLECT METH UR: ABNORMAL
UROBILINOGEN UR STRIP-ACNC: NEGATIVE EU/DL
WBC #/AREA URNS HPF: 2 /HPF (ref 0–5)

## 2021-06-22 PROCEDURE — 82306 VITAMIN D 25 HYDROXY: CPT | Performed by: INTERNAL MEDICINE

## 2021-06-22 PROCEDURE — 81001 URINALYSIS AUTO W/SCOPE: CPT | Performed by: INTERNAL MEDICINE

## 2021-06-22 PROCEDURE — 36415 COLL VENOUS BLD VENIPUNCTURE: CPT | Performed by: INTERNAL MEDICINE

## 2021-06-22 PROCEDURE — 80069 RENAL FUNCTION PANEL: CPT | Performed by: INTERNAL MEDICINE

## 2021-06-22 PROCEDURE — 82570 ASSAY OF URINE CREATININE: CPT | Performed by: INTERNAL MEDICINE

## 2021-07-22 ENCOUNTER — OFFICE VISIT (OUTPATIENT)
Dept: FAMILY MEDICINE | Facility: CLINIC | Age: 76
End: 2021-07-22
Payer: COMMERCIAL

## 2021-07-22 VITALS
BODY MASS INDEX: 24.91 KG/M2 | HEIGHT: 66 IN | DIASTOLIC BLOOD PRESSURE: 88 MMHG | WEIGHT: 155 LBS | HEART RATE: 76 BPM | SYSTOLIC BLOOD PRESSURE: 136 MMHG

## 2021-07-22 DIAGNOSIS — N18.31 STAGE 3A CHRONIC KIDNEY DISEASE: ICD-10-CM

## 2021-07-22 DIAGNOSIS — Z78.0 MENOPAUSE: ICD-10-CM

## 2021-07-22 DIAGNOSIS — I10 ESSENTIAL HYPERTENSION: ICD-10-CM

## 2021-07-22 DIAGNOSIS — R39.9 UTI SYMPTOMS: Primary | ICD-10-CM

## 2021-07-22 LAB
BILIRUB UR QL STRIP: NEGATIVE
GLUCOSE UR QL STRIP: NEGATIVE
KETONES UR QL STRIP: NEGATIVE
LEUKOCYTE ESTERASE UR QL STRIP: POSITIVE
PH, POC UA: 5
POC BLOOD, URINE: POSITIVE
POC NITRATES, URINE: NEGATIVE
PROT UR QL STRIP: POSITIVE
SP GR UR STRIP: 1.03 (ref 1–1.03)
UROBILINOGEN UR STRIP-ACNC: 0.2 (ref 0.1–1.1)

## 2021-07-22 PROCEDURE — 1159F PR MEDICATION LIST DOCUMENTED IN MEDICAL RECORD: ICD-10-PCS | Mod: S$GLB,,, | Performed by: NURSE PRACTITIONER

## 2021-07-22 PROCEDURE — 3288F PR FALLS RISK ASSESSMENT DOCUMENTED: ICD-10-PCS | Mod: S$GLB,,, | Performed by: NURSE PRACTITIONER

## 2021-07-22 PROCEDURE — 3075F SYST BP GE 130 - 139MM HG: CPT | Mod: S$GLB,,, | Performed by: NURSE PRACTITIONER

## 2021-07-22 PROCEDURE — 1101F PR PT FALLS ASSESS DOC 0-1 FALLS W/OUT INJ PAST YR: ICD-10-PCS | Mod: S$GLB,,, | Performed by: NURSE PRACTITIONER

## 2021-07-22 PROCEDURE — 3075F PR MOST RECENT SYSTOLIC BLOOD PRESS GE 130-139MM HG: ICD-10-PCS | Mod: S$GLB,,, | Performed by: NURSE PRACTITIONER

## 2021-07-22 PROCEDURE — 81003 POCT URINALYSIS, DIPSTICK, AUTOMATED, W/O SCOPE: ICD-10-PCS | Mod: QW,S$GLB,, | Performed by: NURSE PRACTITIONER

## 2021-07-22 PROCEDURE — 3079F DIAST BP 80-89 MM HG: CPT | Mod: S$GLB,,, | Performed by: NURSE PRACTITIONER

## 2021-07-22 PROCEDURE — 3079F PR MOST RECENT DIASTOLIC BLOOD PRESSURE 80-89 MM HG: ICD-10-PCS | Mod: S$GLB,,, | Performed by: NURSE PRACTITIONER

## 2021-07-22 PROCEDURE — 1159F MED LIST DOCD IN RCRD: CPT | Mod: S$GLB,,, | Performed by: NURSE PRACTITIONER

## 2021-07-22 PROCEDURE — 99213 PR OFFICE/OUTPT VISIT, EST, LEVL III, 20-29 MIN: ICD-10-PCS | Mod: 25,S$GLB,, | Performed by: NURSE PRACTITIONER

## 2021-07-22 PROCEDURE — 99213 OFFICE O/P EST LOW 20 MIN: CPT | Mod: 25,S$GLB,, | Performed by: NURSE PRACTITIONER

## 2021-07-22 PROCEDURE — 1101F PT FALLS ASSESS-DOCD LE1/YR: CPT | Mod: S$GLB,,, | Performed by: NURSE PRACTITIONER

## 2021-07-22 PROCEDURE — 3288F FALL RISK ASSESSMENT DOCD: CPT | Mod: S$GLB,,, | Performed by: NURSE PRACTITIONER

## 2021-07-22 PROCEDURE — 81003 URINALYSIS AUTO W/O SCOPE: CPT | Mod: QW,S$GLB,, | Performed by: NURSE PRACTITIONER

## 2021-07-22 RX ORDER — CIPROFLOXACIN 500 MG/1
500 TABLET ORAL 2 TIMES DAILY
Qty: 10 TABLET | Refills: 0 | Status: SHIPPED | OUTPATIENT
Start: 2021-07-22 | End: 2021-11-04

## 2021-07-24 LAB — BACTERIA UR CULT: ABNORMAL

## 2021-07-26 ENCOUNTER — TELEPHONE (OUTPATIENT)
Dept: FAMILY MEDICINE | Facility: CLINIC | Age: 76
End: 2021-07-26

## 2021-09-07 ENCOUNTER — LAB VISIT (OUTPATIENT)
Dept: LAB | Facility: HOSPITAL | Age: 76
End: 2021-09-07
Attending: INTERNAL MEDICINE
Payer: COMMERCIAL

## 2021-09-07 DIAGNOSIS — E83.118 OTHER HEMOCHROMATOSIS: ICD-10-CM

## 2021-09-07 LAB
BASOPHILS # BLD AUTO: 0.04 K/UL (ref 0–0.2)
BASOPHILS NFR BLD: 0.8 % (ref 0–1.9)
DIFFERENTIAL METHOD: NORMAL
EOSINOPHIL # BLD AUTO: 0.2 K/UL (ref 0–0.5)
EOSINOPHIL NFR BLD: 4.3 % (ref 0–8)
ERYTHROCYTE [DISTWIDTH] IN BLOOD BY AUTOMATED COUNT: 13 % (ref 11.5–14.5)
FERRITIN SERPL-MCNC: 153 NG/ML (ref 20–300)
HCT VFR BLD AUTO: 40.7 % (ref 37–48.5)
HGB BLD-MCNC: 13.6 G/DL (ref 12–16)
IMM GRANULOCYTES # BLD AUTO: 0.01 K/UL (ref 0–0.04)
IMM GRANULOCYTES NFR BLD AUTO: 0.2 % (ref 0–0.5)
LYMPHOCYTES # BLD AUTO: 1.3 K/UL (ref 1–4.8)
LYMPHOCYTES NFR BLD: 26.8 % (ref 18–48)
MCH RBC QN AUTO: 30.3 PG (ref 27–31)
MCHC RBC AUTO-ENTMCNC: 33.4 G/DL (ref 32–36)
MCV RBC AUTO: 91 FL (ref 82–98)
MONOCYTES # BLD AUTO: 0.4 K/UL (ref 0.3–1)
MONOCYTES NFR BLD: 8.7 % (ref 4–15)
NEUTROPHILS # BLD AUTO: 2.9 K/UL (ref 1.8–7.7)
NEUTROPHILS NFR BLD: 59.2 % (ref 38–73)
NRBC BLD-RTO: 0 /100 WBC
PLATELET # BLD AUTO: 233 K/UL (ref 150–450)
PMV BLD AUTO: 10.1 FL (ref 9.2–12.9)
RBC # BLD AUTO: 4.49 M/UL (ref 4–5.4)
WBC # BLD AUTO: 4.85 K/UL (ref 3.9–12.7)

## 2021-09-07 PROCEDURE — 36415 COLL VENOUS BLD VENIPUNCTURE: CPT | Performed by: INTERNAL MEDICINE

## 2021-09-07 PROCEDURE — 82728 ASSAY OF FERRITIN: CPT | Performed by: INTERNAL MEDICINE

## 2021-09-07 PROCEDURE — 85025 COMPLETE CBC W/AUTO DIFF WBC: CPT | Performed by: INTERNAL MEDICINE

## 2021-09-13 ENCOUNTER — OFFICE VISIT (OUTPATIENT)
Dept: HEMATOLOGY/ONCOLOGY | Facility: CLINIC | Age: 76
End: 2021-09-13
Payer: COMMERCIAL

## 2021-09-13 VITALS
HEART RATE: 75 BPM | SYSTOLIC BLOOD PRESSURE: 156 MMHG | WEIGHT: 152.88 LBS | DIASTOLIC BLOOD PRESSURE: 66 MMHG | BODY MASS INDEX: 24.68 KG/M2 | RESPIRATION RATE: 18 BRPM | TEMPERATURE: 98 F

## 2021-09-13 DIAGNOSIS — E83.118 OTHER HEMOCHROMATOSIS: Primary | ICD-10-CM

## 2021-09-13 PROCEDURE — 3077F SYST BP >= 140 MM HG: CPT | Mod: S$GLB,,, | Performed by: INTERNAL MEDICINE

## 2021-09-13 PROCEDURE — 1101F PR PT FALLS ASSESS DOC 0-1 FALLS W/OUT INJ PAST YR: ICD-10-PCS | Mod: S$GLB,,, | Performed by: INTERNAL MEDICINE

## 2021-09-13 PROCEDURE — 99214 OFFICE O/P EST MOD 30 MIN: CPT | Mod: S$GLB,,, | Performed by: INTERNAL MEDICINE

## 2021-09-13 PROCEDURE — 3078F DIAST BP <80 MM HG: CPT | Mod: S$GLB,,, | Performed by: INTERNAL MEDICINE

## 2021-09-13 PROCEDURE — 1126F AMNT PAIN NOTED NONE PRSNT: CPT | Mod: S$GLB,,, | Performed by: INTERNAL MEDICINE

## 2021-09-13 PROCEDURE — 3077F PR MOST RECENT SYSTOLIC BLOOD PRESSURE >= 140 MM HG: ICD-10-PCS | Mod: S$GLB,,, | Performed by: INTERNAL MEDICINE

## 2021-09-13 PROCEDURE — 1101F PT FALLS ASSESS-DOCD LE1/YR: CPT | Mod: S$GLB,,, | Performed by: INTERNAL MEDICINE

## 2021-09-13 PROCEDURE — 1159F MED LIST DOCD IN RCRD: CPT | Mod: S$GLB,,, | Performed by: INTERNAL MEDICINE

## 2021-09-13 PROCEDURE — 99214 PR OFFICE/OUTPT VISIT, EST, LEVL IV, 30-39 MIN: ICD-10-PCS | Mod: S$GLB,,, | Performed by: INTERNAL MEDICINE

## 2021-09-13 PROCEDURE — 3288F PR FALLS RISK ASSESSMENT DOCUMENTED: ICD-10-PCS | Mod: S$GLB,,, | Performed by: INTERNAL MEDICINE

## 2021-09-13 PROCEDURE — 3288F FALL RISK ASSESSMENT DOCD: CPT | Mod: S$GLB,,, | Performed by: INTERNAL MEDICINE

## 2021-09-13 PROCEDURE — 1159F PR MEDICATION LIST DOCUMENTED IN MEDICAL RECORD: ICD-10-PCS | Mod: S$GLB,,, | Performed by: INTERNAL MEDICINE

## 2021-09-13 PROCEDURE — 3078F PR MOST RECENT DIASTOLIC BLOOD PRESSURE < 80 MM HG: ICD-10-PCS | Mod: S$GLB,,, | Performed by: INTERNAL MEDICINE

## 2021-09-13 PROCEDURE — 1126F PR PAIN SEVERITY QUANTIFIED, NO PAIN PRESENT: ICD-10-PCS | Mod: S$GLB,,, | Performed by: INTERNAL MEDICINE

## 2021-10-07 ENCOUNTER — CLINICAL SUPPORT (OUTPATIENT)
Dept: FAMILY MEDICINE | Facility: CLINIC | Age: 76
End: 2021-10-07
Payer: COMMERCIAL

## 2021-10-07 VITALS — TEMPERATURE: 99 F

## 2021-10-07 DIAGNOSIS — Z23 NEED FOR INFLUENZA VACCINATION: Primary | ICD-10-CM

## 2021-10-07 PROCEDURE — 90471 IMMUNIZATION ADMIN: CPT | Mod: S$GLB,,, | Performed by: FAMILY MEDICINE

## 2021-10-07 PROCEDURE — 90662 FLU VACCINE - QUADRIVALENT - HIGH DOSE (65+) PRESERVATIVE FREE IM: ICD-10-PCS | Mod: S$GLB,,, | Performed by: FAMILY MEDICINE

## 2021-10-07 PROCEDURE — 90662 IIV NO PRSV INCREASED AG IM: CPT | Mod: S$GLB,,, | Performed by: FAMILY MEDICINE

## 2021-10-07 PROCEDURE — 90471 FLU VACCINE - QUADRIVALENT - HIGH DOSE (65+) PRESERVATIVE FREE IM: ICD-10-PCS | Mod: S$GLB,,, | Performed by: FAMILY MEDICINE

## 2021-11-04 ENCOUNTER — OFFICE VISIT (OUTPATIENT)
Dept: FAMILY MEDICINE | Facility: CLINIC | Age: 76
End: 2021-11-04
Payer: COMMERCIAL

## 2021-11-04 VITALS
DIASTOLIC BLOOD PRESSURE: 62 MMHG | SYSTOLIC BLOOD PRESSURE: 130 MMHG | WEIGHT: 153 LBS | HEIGHT: 66 IN | BODY MASS INDEX: 24.59 KG/M2 | HEART RATE: 62 BPM

## 2021-11-04 DIAGNOSIS — G20.A1 PARKINSON DISEASE: ICD-10-CM

## 2021-11-04 DIAGNOSIS — F31.76 BIPOLAR DISORDER, IN FULL REMISSION, MOST RECENT EPISODE DEPRESSED: Primary | ICD-10-CM

## 2021-11-04 DIAGNOSIS — K21.9 GASTROESOPHAGEAL REFLUX DISEASE WITHOUT ESOPHAGITIS: ICD-10-CM

## 2021-11-04 DIAGNOSIS — N18.31 STAGE 3A CHRONIC KIDNEY DISEASE: ICD-10-CM

## 2021-11-04 DIAGNOSIS — E83.118 OTHER HEMOCHROMATOSIS: ICD-10-CM

## 2021-11-04 DIAGNOSIS — Z78.0 MENOPAUSE: ICD-10-CM

## 2021-11-04 DIAGNOSIS — I10 ESSENTIAL HYPERTENSION: ICD-10-CM

## 2021-11-04 DIAGNOSIS — E78.00 HIGH CHOLESTEROL: ICD-10-CM

## 2021-11-04 PROCEDURE — 1101F PR PT FALLS ASSESS DOC 0-1 FALLS W/OUT INJ PAST YR: ICD-10-PCS | Mod: S$GLB,,, | Performed by: FAMILY MEDICINE

## 2021-11-04 PROCEDURE — 99214 PR OFFICE/OUTPT VISIT, EST, LEVL IV, 30-39 MIN: ICD-10-PCS | Mod: S$GLB,,, | Performed by: FAMILY MEDICINE

## 2021-11-04 PROCEDURE — 1101F PT FALLS ASSESS-DOCD LE1/YR: CPT | Mod: S$GLB,,, | Performed by: FAMILY MEDICINE

## 2021-11-04 PROCEDURE — 3075F SYST BP GE 130 - 139MM HG: CPT | Mod: S$GLB,,, | Performed by: FAMILY MEDICINE

## 2021-11-04 PROCEDURE — 1159F PR MEDICATION LIST DOCUMENTED IN MEDICAL RECORD: ICD-10-PCS | Mod: S$GLB,,, | Performed by: FAMILY MEDICINE

## 2021-11-04 PROCEDURE — 1160F RVW MEDS BY RX/DR IN RCRD: CPT | Mod: S$GLB,,, | Performed by: FAMILY MEDICINE

## 2021-11-04 PROCEDURE — 3078F DIAST BP <80 MM HG: CPT | Mod: S$GLB,,, | Performed by: FAMILY MEDICINE

## 2021-11-04 PROCEDURE — 3078F PR MOST RECENT DIASTOLIC BLOOD PRESSURE < 80 MM HG: ICD-10-PCS | Mod: S$GLB,,, | Performed by: FAMILY MEDICINE

## 2021-11-04 PROCEDURE — 3288F PR FALLS RISK ASSESSMENT DOCUMENTED: ICD-10-PCS | Mod: S$GLB,,, | Performed by: FAMILY MEDICINE

## 2021-11-04 PROCEDURE — 1159F MED LIST DOCD IN RCRD: CPT | Mod: S$GLB,,, | Performed by: FAMILY MEDICINE

## 2021-11-04 PROCEDURE — 3288F FALL RISK ASSESSMENT DOCD: CPT | Mod: S$GLB,,, | Performed by: FAMILY MEDICINE

## 2021-11-04 PROCEDURE — 3075F PR MOST RECENT SYSTOLIC BLOOD PRESS GE 130-139MM HG: ICD-10-PCS | Mod: S$GLB,,, | Performed by: FAMILY MEDICINE

## 2021-11-04 PROCEDURE — 1160F PR REVIEW ALL MEDS BY PRESCRIBER/CLIN PHARMACIST DOCUMENTED: ICD-10-PCS | Mod: S$GLB,,, | Performed by: FAMILY MEDICINE

## 2021-11-04 PROCEDURE — 99214 OFFICE O/P EST MOD 30 MIN: CPT | Mod: S$GLB,,, | Performed by: FAMILY MEDICINE

## 2021-11-04 RX ORDER — ROSUVASTATIN CALCIUM 10 MG/1
10 TABLET, COATED ORAL DAILY
Qty: 90 TABLET | Refills: 1 | Status: SHIPPED | OUTPATIENT
Start: 2021-11-04 | End: 2021-11-29 | Stop reason: SDUPTHER

## 2021-11-04 RX ORDER — OMEPRAZOLE 20 MG/1
20 CAPSULE, DELAYED RELEASE ORAL DAILY
COMMUNITY
End: 2022-05-05 | Stop reason: SDUPTHER

## 2021-11-04 RX ORDER — LISINOPRIL 20 MG/1
20 TABLET ORAL DAILY
Qty: 90 TABLET | Refills: 1 | Status: SHIPPED | OUTPATIENT
Start: 2021-11-04 | End: 2021-11-29 | Stop reason: SDUPTHER

## 2021-11-05 ENCOUNTER — TELEPHONE (OUTPATIENT)
Dept: FAMILY MEDICINE | Facility: CLINIC | Age: 76
End: 2021-11-05
Payer: COMMERCIAL

## 2021-11-05 LAB
ALBUMIN SERPL-MCNC: 4.2 G/DL (ref 3.6–5.1)
ALBUMIN/GLOB SERPL: 1.8 (CALC) (ref 1–2.5)
ALP SERPL-CCNC: 120 U/L (ref 37–153)
ALT SERPL-CCNC: 12 U/L (ref 6–29)
AST SERPL-CCNC: 16 U/L (ref 10–35)
BASOPHILS # BLD AUTO: 50 CELLS/UL (ref 0–200)
BASOPHILS NFR BLD AUTO: 1.1 %
BILIRUB SERPL-MCNC: 0.9 MG/DL (ref 0.2–1.2)
BUN SERPL-MCNC: 17 MG/DL (ref 7–25)
BUN/CREAT SERPL: 14 (CALC) (ref 6–22)
CALCIUM SERPL-MCNC: 9.7 MG/DL (ref 8.6–10.4)
CHLORIDE SERPL-SCNC: 107 MMOL/L (ref 98–110)
CHOLEST SERPL-MCNC: 164 MG/DL
CHOLEST/HDLC SERPL: 2.6 (CALC)
CO2 SERPL-SCNC: 23 MMOL/L (ref 20–32)
CREAT SERPL-MCNC: 1.25 MG/DL (ref 0.6–0.93)
EOSINOPHIL # BLD AUTO: 149 CELLS/UL (ref 15–500)
EOSINOPHIL NFR BLD AUTO: 3.3 %
ERYTHROCYTE [DISTWIDTH] IN BLOOD BY AUTOMATED COUNT: 12.7 % (ref 11–15)
GLOBULIN SER CALC-MCNC: 2.4 G/DL (CALC) (ref 1.9–3.7)
GLUCOSE SERPL-MCNC: 97 MG/DL (ref 65–99)
HCT VFR BLD AUTO: 40.7 % (ref 35–45)
HDLC SERPL-MCNC: 62 MG/DL
HGB BLD-MCNC: 13.8 G/DL (ref 11.7–15.5)
LDLC SERPL CALC-MCNC: 82 MG/DL (CALC)
LITHIUM SERPL-SCNC: 0.3 MMOL/L (ref 0.6–1.2)
LYMPHOCYTES # BLD AUTO: 1107 CELLS/UL (ref 850–3900)
LYMPHOCYTES NFR BLD AUTO: 24.6 %
MCH RBC QN AUTO: 30.4 PG (ref 27–33)
MCHC RBC AUTO-ENTMCNC: 33.9 G/DL (ref 32–36)
MCV RBC AUTO: 89.6 FL (ref 80–100)
MONOCYTES # BLD AUTO: 329 CELLS/UL (ref 200–950)
MONOCYTES NFR BLD AUTO: 7.3 %
NEUTROPHILS # BLD AUTO: 2867 CELLS/UL (ref 1500–7800)
NEUTROPHILS NFR BLD AUTO: 63.7 %
NONHDLC SERPL-MCNC: 102 MG/DL (CALC)
PLATELET # BLD AUTO: 231 THOUSAND/UL (ref 140–400)
PMV BLD REES-ECKER: 9.9 FL (ref 7.5–12.5)
POTASSIUM SERPL-SCNC: 4.2 MMOL/L (ref 3.5–5.3)
PROT SERPL-MCNC: 6.6 G/DL (ref 6.1–8.1)
RBC # BLD AUTO: 4.54 MILLION/UL (ref 3.8–5.1)
SODIUM SERPL-SCNC: 140 MMOL/L (ref 135–146)
TRIGL SERPL-MCNC: 103 MG/DL
WBC # BLD AUTO: 4.5 THOUSAND/UL (ref 3.8–10.8)

## 2021-11-08 ENCOUNTER — TELEPHONE (OUTPATIENT)
Dept: FAMILY MEDICINE | Facility: CLINIC | Age: 76
End: 2021-11-08
Payer: COMMERCIAL

## 2021-11-29 DIAGNOSIS — I10 ESSENTIAL HYPERTENSION: ICD-10-CM

## 2021-11-29 DIAGNOSIS — E78.00 HIGH CHOLESTEROL: ICD-10-CM

## 2021-11-29 RX ORDER — ROSUVASTATIN CALCIUM 10 MG/1
10 TABLET, COATED ORAL DAILY
Qty: 90 TABLET | Refills: 1 | Status: SHIPPED | OUTPATIENT
Start: 2021-11-29 | End: 2022-05-05 | Stop reason: SDUPTHER

## 2021-11-29 RX ORDER — LISINOPRIL 20 MG/1
20 TABLET ORAL DAILY
Qty: 90 TABLET | Refills: 1 | Status: SHIPPED | OUTPATIENT
Start: 2021-11-29 | End: 2022-05-05 | Stop reason: SDUPTHER

## 2021-12-15 DIAGNOSIS — Z12.31 ENCOUNTER FOR SCREENING MAMMOGRAM FOR MALIGNANT NEOPLASM OF BREAST: Primary | ICD-10-CM

## 2022-01-04 ENCOUNTER — HOSPITAL ENCOUNTER (OUTPATIENT)
Dept: RADIOLOGY | Facility: HOSPITAL | Age: 77
Discharge: HOME | End: 2022-01-04
Attending: NURSE PRACTITIONER
Payer: COMMERCIAL

## 2022-01-04 DIAGNOSIS — Z12.31 ENCOUNTER FOR SCREENING MAMMOGRAM FOR MALIGNANT NEOPLASM OF BREAST: ICD-10-CM

## 2022-01-04 PROCEDURE — 77063 BREAST TOMOSYNTHESIS BI: CPT | Mod: TC,PO

## 2022-01-04 PROCEDURE — 77067 SCR MAMMO BI INCL CAD: CPT | Mod: TC,PO

## 2022-04-20 RX ORDER — LITHIUM CARBONATE 150 MG/1
150 CAPSULE ORAL DAILY
Qty: 90 CAPSULE | Refills: 1 | Status: SHIPPED | OUTPATIENT
Start: 2022-04-20 | End: 2022-11-16 | Stop reason: SDUPTHER

## 2022-04-20 NOTE — TELEPHONE ENCOUNTER
----- Message from Dotty Garnett sent at 4/20/2022  3:12 PM CDT -----  Vm- refill on lithium 150 once a day since her psychiatrist is no longer practicing   [facility] deb  [phone]

## 2022-05-02 ENCOUNTER — TELEPHONE (OUTPATIENT)
Dept: FAMILY MEDICINE | Facility: CLINIC | Age: 77
End: 2022-05-02

## 2022-05-02 DIAGNOSIS — N18.31 STAGE 3A CHRONIC KIDNEY DISEASE: ICD-10-CM

## 2022-05-02 DIAGNOSIS — Z79.899 ENCOUNTER FOR LONG-TERM (CURRENT) USE OF OTHER MEDICATIONS: ICD-10-CM

## 2022-05-02 DIAGNOSIS — F31.76 BIPOLAR DISORDER, IN FULL REMISSION, MOST RECENT EPISODE DEPRESSED: Primary | ICD-10-CM

## 2022-05-02 NOTE — TELEPHONE ENCOUNTER
----- Message from Dotty Garnett sent at 5/2/2022 10:11 AM CDT -----  Vm- pt would like to know if she needs blood work done before her appt   [phone]

## 2022-05-02 NOTE — TELEPHONE ENCOUNTER
Spoke with pt about message sent. Pt is wanting to know if she needs blood work before her up coming appointment. Verbalized that yes blood work is needed and orders are set-up for her. Pt acknowledge understanding.

## 2022-05-03 ENCOUNTER — TELEPHONE (OUTPATIENT)
Dept: FAMILY MEDICINE | Facility: CLINIC | Age: 77
End: 2022-05-03

## 2022-05-03 LAB
ALBUMIN SERPL-MCNC: 4.4 G/DL (ref 3.6–5.1)
ALBUMIN/GLOB SERPL: 1.8 (CALC) (ref 1–2.5)
ALP SERPL-CCNC: 121 U/L (ref 37–153)
ALT SERPL-CCNC: 12 U/L (ref 6–29)
APPEARANCE UR: CLEAR
AST SERPL-CCNC: 16 U/L (ref 10–35)
BACTERIA #/AREA URNS HPF: ABNORMAL /HPF
BACTERIA UR CULT: ABNORMAL
BASOPHILS # BLD AUTO: 51 CELLS/UL (ref 0–200)
BASOPHILS NFR BLD AUTO: 1.1 %
BILIRUB SERPL-MCNC: 1 MG/DL (ref 0.2–1.2)
BILIRUB UR QL STRIP: NEGATIVE
BUN SERPL-MCNC: 20 MG/DL (ref 7–25)
BUN/CREAT SERPL: 16 (CALC) (ref 6–22)
CALCIUM SERPL-MCNC: 9.9 MG/DL (ref 8.6–10.4)
CHLORIDE SERPL-SCNC: 107 MMOL/L (ref 98–110)
CHOLEST SERPL-MCNC: 169 MG/DL
CHOLEST/HDLC SERPL: 2.8 (CALC)
CO2 SERPL-SCNC: 23 MMOL/L (ref 20–32)
COLOR UR: YELLOW
CREAT SERPL-MCNC: 1.28 MG/DL (ref 0.6–0.93)
EOSINOPHIL # BLD AUTO: 129 CELLS/UL (ref 15–500)
EOSINOPHIL NFR BLD AUTO: 2.8 %
ERYTHROCYTE [DISTWIDTH] IN BLOOD BY AUTOMATED COUNT: 13.1 % (ref 11–15)
GLOBULIN SER CALC-MCNC: 2.5 G/DL (CALC) (ref 1.9–3.7)
GLUCOSE SERPL-MCNC: 94 MG/DL (ref 65–99)
GLUCOSE UR QL STRIP: NEGATIVE
HCT VFR BLD AUTO: 40.9 % (ref 35–45)
HDLC SERPL-MCNC: 60 MG/DL
HGB BLD-MCNC: 13.5 G/DL (ref 11.7–15.5)
HGB UR QL STRIP: ABNORMAL
HYALINE CASTS #/AREA URNS LPF: ABNORMAL /LPF
KETONES UR QL STRIP: NEGATIVE
LDLC SERPL CALC-MCNC: 87 MG/DL (CALC)
LEUKOCYTE ESTERASE UR QL STRIP: NEGATIVE
LITHIUM SERPL-SCNC: <0.3 MMOL/L (ref 0.6–1.2)
LYMPHOCYTES # BLD AUTO: 1141 CELLS/UL (ref 850–3900)
LYMPHOCYTES NFR BLD AUTO: 24.8 %
MCH RBC QN AUTO: 29.9 PG (ref 27–33)
MCHC RBC AUTO-ENTMCNC: 33 G/DL (ref 32–36)
MCV RBC AUTO: 90.5 FL (ref 80–100)
MONOCYTES # BLD AUTO: 359 CELLS/UL (ref 200–950)
MONOCYTES NFR BLD AUTO: 7.8 %
NEUTROPHILS # BLD AUTO: 2921 CELLS/UL (ref 1500–7800)
NEUTROPHILS NFR BLD AUTO: 63.5 %
NITRITE UR QL STRIP: NEGATIVE
NONHDLC SERPL-MCNC: 109 MG/DL (CALC)
PH UR STRIP: ABNORMAL [PH] (ref 5–8)
PLATELET # BLD AUTO: 239 THOUSAND/UL (ref 140–400)
PMV BLD REES-ECKER: 10 FL (ref 7.5–12.5)
POTASSIUM SERPL-SCNC: 4.3 MMOL/L (ref 3.5–5.3)
PROT SERPL-MCNC: 6.9 G/DL (ref 6.1–8.1)
PROT UR QL STRIP: NEGATIVE
RBC # BLD AUTO: 4.52 MILLION/UL (ref 3.8–5.1)
RBC #/AREA URNS HPF: ABNORMAL /HPF
SODIUM SERPL-SCNC: 139 MMOL/L (ref 135–146)
SP GR UR STRIP: 1.02 (ref 1–1.03)
SQUAMOUS #/AREA URNS HPF: ABNORMAL /HPF
TRIGL SERPL-MCNC: 128 MG/DL
TSH SERPL-ACNC: 1.4 MIU/L (ref 0.4–4.5)
WBC # BLD AUTO: 4.6 THOUSAND/UL (ref 3.8–10.8)
WBC #/AREA URNS HPF: ABNORMAL /HPF

## 2022-05-05 ENCOUNTER — OFFICE VISIT (OUTPATIENT)
Dept: FAMILY MEDICINE | Facility: CLINIC | Age: 77
End: 2022-05-05
Payer: COMMERCIAL

## 2022-05-05 VITALS
HEIGHT: 66 IN | HEART RATE: 68 BPM | DIASTOLIC BLOOD PRESSURE: 80 MMHG | BODY MASS INDEX: 24.27 KG/M2 | SYSTOLIC BLOOD PRESSURE: 136 MMHG | WEIGHT: 151 LBS

## 2022-05-05 DIAGNOSIS — F31.76 BIPOLAR DISORDER, IN FULL REMISSION, MOST RECENT EPISODE DEPRESSED: Primary | ICD-10-CM

## 2022-05-05 DIAGNOSIS — Z78.0 MENOPAUSE: ICD-10-CM

## 2022-05-05 DIAGNOSIS — G20.A1 PARKINSON DISEASE: ICD-10-CM

## 2022-05-05 DIAGNOSIS — H90.0 CONDUCTIVE HEARING LOSS, BILATERAL: ICD-10-CM

## 2022-05-05 DIAGNOSIS — K21.9 GASTROESOPHAGEAL REFLUX DISEASE WITHOUT ESOPHAGITIS: ICD-10-CM

## 2022-05-05 DIAGNOSIS — I10 ESSENTIAL HYPERTENSION: ICD-10-CM

## 2022-05-05 DIAGNOSIS — E78.00 HIGH CHOLESTEROL: ICD-10-CM

## 2022-05-05 DIAGNOSIS — N18.31 STAGE 3A CHRONIC KIDNEY DISEASE: ICD-10-CM

## 2022-05-05 PROCEDURE — 3079F DIAST BP 80-89 MM HG: CPT | Mod: CPTII,S$GLB,, | Performed by: FAMILY MEDICINE

## 2022-05-05 PROCEDURE — 1159F MED LIST DOCD IN RCRD: CPT | Mod: CPTII,S$GLB,, | Performed by: FAMILY MEDICINE

## 2022-05-05 PROCEDURE — 99214 OFFICE O/P EST MOD 30 MIN: CPT | Mod: S$GLB,,, | Performed by: FAMILY MEDICINE

## 2022-05-05 PROCEDURE — 3075F SYST BP GE 130 - 139MM HG: CPT | Mod: CPTII,S$GLB,, | Performed by: FAMILY MEDICINE

## 2022-05-05 PROCEDURE — 99214 PR OFFICE/OUTPT VISIT, EST, LEVL IV, 30-39 MIN: ICD-10-PCS | Mod: S$GLB,,, | Performed by: FAMILY MEDICINE

## 2022-05-05 PROCEDURE — 3079F PR MOST RECENT DIASTOLIC BLOOD PRESSURE 80-89 MM HG: ICD-10-PCS | Mod: CPTII,S$GLB,, | Performed by: FAMILY MEDICINE

## 2022-05-05 PROCEDURE — 3075F PR MOST RECENT SYSTOLIC BLOOD PRESS GE 130-139MM HG: ICD-10-PCS | Mod: CPTII,S$GLB,, | Performed by: FAMILY MEDICINE

## 2022-05-05 PROCEDURE — 1159F PR MEDICATION LIST DOCUMENTED IN MEDICAL RECORD: ICD-10-PCS | Mod: CPTII,S$GLB,, | Performed by: FAMILY MEDICINE

## 2022-05-05 RX ORDER — FLUOXETINE 10 MG/1
10 CAPSULE ORAL DAILY
Qty: 90 CAPSULE | Refills: 1 | Status: SHIPPED | OUTPATIENT
Start: 2022-05-05 | End: 2023-11-16

## 2022-05-05 RX ORDER — ROSUVASTATIN CALCIUM 10 MG/1
10 TABLET, COATED ORAL DAILY
Qty: 90 TABLET | Refills: 1 | Status: SHIPPED | OUTPATIENT
Start: 2022-05-05 | End: 2022-11-16 | Stop reason: SDUPTHER

## 2022-05-05 RX ORDER — LITHIUM CARBONATE 150 MG/1
150 CAPSULE ORAL DAILY
Qty: 90 CAPSULE | Refills: 1 | Status: CANCELLED | OUTPATIENT
Start: 2022-05-05 | End: 2022-06-04

## 2022-05-05 RX ORDER — OMEPRAZOLE 20 MG/1
20 CAPSULE, DELAYED RELEASE ORAL DAILY
Qty: 90 CAPSULE | Refills: 1 | Status: SHIPPED | OUTPATIENT
Start: 2022-05-05 | End: 2022-11-16 | Stop reason: SDUPTHER

## 2022-05-05 RX ORDER — LISINOPRIL 20 MG/1
20 TABLET ORAL DAILY
Qty: 90 TABLET | Refills: 1 | Status: SHIPPED | OUTPATIENT
Start: 2022-05-05 | End: 2022-11-16 | Stop reason: SDUPTHER

## 2022-05-05 NOTE — PROGRESS NOTES
SUBJECTIVE:    Patient ID: Payton Gutierrez is a 76 y.o. female.    Chief Complaint: Follow-up and Medication Refill (Brought bottles // Lab-results //  F/U 6 months //abc)    76-year-old female active with gardening, no longer  Walks in neighborhood,.    Dr Bradley -neurology.  Diagnosed her with Parkinson's disease.  He is retiring in February she has had no falls but does feel stiffness in the morning but warms up as the day goes on.    Dr. Horton-follows her for chronic kidney disease and lithium monitoring.    Dr. Loni katz-psychiatrist has her on low-dose lithium 1 tablet daily.  For bipolar.    January 2022, mammogram normal    2017-colonoscopy Dr. Jain-no RTC necessary.    Has a new hearing aid for the right ear for hearing loss.      Telephone on 05/02/2022   Component Date Value Ref Range Status    WBC 05/02/2022 4.6  3.8 - 10.8 Thousand/uL Final    RBC 05/02/2022 4.52  3.80 - 5.10 Million/uL Final    Hemoglobin 05/02/2022 13.5  11.7 - 15.5 g/dL Final    Hematocrit 05/02/2022 40.9  35.0 - 45.0 % Final    MCV 05/02/2022 90.5  80.0 - 100.0 fL Final    MCH 05/02/2022 29.9  27.0 - 33.0 pg Final    MCHC 05/02/2022 33.0  32.0 - 36.0 g/dL Final    RDW 05/02/2022 13.1  11.0 - 15.0 % Final    Platelets 05/02/2022 239  140 - 400 Thousand/uL Final    MPV 05/02/2022 10.0  7.5 - 12.5 fL Final    Neutrophils, Abs 05/02/2022 2,921  1,500 - 7,800 cells/uL Final    Lymph # 05/02/2022 1,141  850 - 3,900 cells/uL Final    Mono # 05/02/2022 359  200 - 950 cells/uL Final    Eos # 05/02/2022 129  15 - 500 cells/uL Final    Baso # 05/02/2022 51  0 - 200 cells/uL Final    Neutrophils Relative 05/02/2022 63.5  % Final    Lymph % 05/02/2022 24.8  % Final    Mono % 05/02/2022 7.8  % Final    Eosinophil % 05/02/2022 2.8  % Final    Basophil % 05/02/2022 1.1  % Final    Glucose 05/02/2022 94  65 - 99 mg/dL Final    BUN 05/02/2022 20  7 - 25 mg/dL Final    Creatinine 05/02/2022 1.28 (A) 0.60 - 0.93  mg/dL Final    eGFR if non African American 05/02/2022 40 (A) > OR = 60 mL/min/1.73m2 Final    eGFR if  05/02/2022 46 (A) > OR = 60 mL/min/1.73m2 Final    BUN/Creatinine Ratio 05/02/2022 16  6 - 22 (calc) Final    Sodium 05/02/2022 139  135 - 146 mmol/L Final    Potassium 05/02/2022 4.3  3.5 - 5.3 mmol/L Final    Chloride 05/02/2022 107  98 - 110 mmol/L Final    CO2 05/02/2022 23  20 - 32 mmol/L Final    Calcium 05/02/2022 9.9  8.6 - 10.4 mg/dL Final    Total Protein 05/02/2022 6.9  6.1 - 8.1 g/dL Final    Albumin 05/02/2022 4.4  3.6 - 5.1 g/dL Final    Globulin, Total 05/02/2022 2.5  1.9 - 3.7 g/dL (calc) Final    Albumin/Globulin Ratio 05/02/2022 1.8  1.0 - 2.5 (calc) Final    Total Bilirubin 05/02/2022 1.0  0.2 - 1.2 mg/dL Final    Alkaline Phosphatase 05/02/2022 121  37 - 153 U/L Final    AST 05/02/2022 16  10 - 35 U/L Final    ALT 05/02/2022 12  6 - 29 U/L Final    Cholesterol 05/02/2022 169  <200 mg/dL Final    HDL 05/02/2022 60  > OR = 50 mg/dL Final    Triglycerides 05/02/2022 128  <150 mg/dL Final    LDL Cholesterol 05/02/2022 87  mg/dL (calc) Final    HDL/Cholesterol Ratio 05/02/2022 2.8  <5.0 (calc) Final    Non HDL Chol. (LDL+VLDL) 05/02/2022 109  <130 mg/dL (calc) Final    Color, UA 05/02/2022 YELLOW  YELLOW Final    Appearance, UA 05/02/2022 CLEAR  CLEAR Final    Specific Gravity, UA 05/02/2022 1.018  1.001 - 1.035 Final    pH, UA 05/02/2022 < OR = 5.0  5.0 - 8.0 Final    Glucose, UA 05/02/2022 NEGATIVE  NEGATIVE Final    Bilirubin, UA 05/02/2022 NEGATIVE  NEGATIVE Final    Ketones, UA 05/02/2022 NEGATIVE  NEGATIVE Final    Occult Blood UA 05/02/2022 TRACE (A) NEGATIVE Final    Protein, UA 05/02/2022 NEGATIVE  NEGATIVE Final    Nitrite, UA 05/02/2022 NEGATIVE  NEGATIVE Final    Leukocytes, UA 05/02/2022 NEGATIVE  NEGATIVE Final    WBC Casts, UA 05/02/2022 0-5  < OR = 5 /HPF Final    RBC Casts, UA 05/02/2022 NONE SEEN  < OR = 2 /HPF Final    Squam  Epithel, UA 05/02/2022 NONE SEEN  < OR = 5 /HPF Final    Bacteria, UA 05/02/2022 NONE SEEN  NONE SEEN /HPF Final    Hyaline Casts, UA 05/02/2022 NONE SEEN  NONE SEEN /LPF Final    Reflexive Urine Culture 05/02/2022    Final    Lithium Level 05/02/2022 <0.3 (A) 0.6 - 1.2 mmol/L Final    TSH 05/02/2022 1.40  0.40 - 4.50 mIU/L Final       Past Medical History:   Diagnosis Date    Glaucoma      Social History     Socioeconomic History    Marital status: Single   Tobacco Use    Smoking status: Never Smoker    Smokeless tobacco: Never Used   Substance and Sexual Activity    Alcohol use: No    Drug use: No     Past Surgical History:   Procedure Laterality Date    CATARACT EXTRACTION  2016    HYSTERECTOMY       Family History   Problem Relation Age of Onset    Cancer Mother        Review of patient's allergies indicates:   Allergen Reactions    Codeine     Sulfa (sulfonamide antibiotics)     Sulfur Other (See Comments)       Current Outpatient Medications:     ergocalciferol, vitamin D2, (VITAMIN D ORAL), Take by mouth., Disp: , Rfl:     lithium carbonate 150 MG capsule, Take 1 capsule (150 mg total) by mouth once daily at 6am., Disp: 90 capsule, Rfl: 1    LUMIGAN 0.01 % Drop, INSTILL 1 DROP BOTH EYES EVERY NIGHT, Disp: , Rfl:     FLUoxetine 10 MG capsule, Take 1 capsule (10 mg total) by mouth once daily., Disp: 90 capsule, Rfl: 1    lisinopriL (PRINIVIL,ZESTRIL) 20 MG tablet, Take 1 tablet (20 mg total) by mouth once daily., Disp: 90 tablet, Rfl: 1    omeprazole (PRILOSEC) 20 MG capsule, Take 1 capsule (20 mg total) by mouth once daily., Disp: 90 capsule, Rfl: 1    rosuvastatin (CRESTOR) 10 MG tablet, Take 1 tablet (10 mg total) by mouth once daily., Disp: 90 tablet, Rfl: 1    Review of Systems   Constitutional: Positive for appetite change (skips breakfast,eats  2 meals   a day). Negative for chills, fatigue, fever and unexpected weight change.   HENT: Negative for congestion, ear pain, sinus pain,  "sore throat and trouble swallowing.    Eyes: Negative for pain, discharge and visual disturbance.   Respiratory: Negative for apnea, cough, shortness of breath and wheezing.    Cardiovascular: Negative for chest pain, palpitations and leg swelling.   Gastrointestinal: Positive for abdominal pain (mid epigastric pains). Negative for blood in stool, constipation, nausea and vomiting. Diarrhea: Lithium side  effect?        Rarely takes omeprazole, possible lactose  intolerance   Endocrine: Negative for heat intolerance, polydipsia and polyuria.   Genitourinary: Negative for difficulty urinating, dyspareunia, dysuria, frequency, hematuria and menstrual problem.        Nocturia  0-1 x  nite   Musculoskeletal: Negative for arthralgias, back pain, gait problem, joint swelling and myalgias.   Allergic/Immunologic: Negative for environmental allergies, food allergies and immunocompromised state.   Neurological: Negative for dizziness, tremors, seizures, numbness and headaches.   Psychiatric/Behavioral: Negative for behavioral problems, confusion, hallucinations and suicidal ideas. The patient is not nervous/anxious.           Objective:      Vitals:    05/05/22 0819   BP: 136/80   Pulse: 68   Weight: 68.5 kg (151 lb)   Height: 5' 6" (1.676 m)     Physical Exam  Vitals and nursing note reviewed.   Constitutional:       General: She is not in acute distress.     Appearance: Normal appearance. She is well-developed. She is not toxic-appearing.   HENT:      Head: Normocephalic and atraumatic.      Right Ear: Tympanic membrane and external ear normal.      Left Ear: Tympanic membrane and external ear normal.      Ears:      Comments: Right hearing     Nose: Nose normal.   Eyes:      Pupils: Pupils are equal, round, and reactive to light.   Neck:      Thyroid: No thyromegaly.      Vascular: No carotid bruit.   Cardiovascular:      Rate and Rhythm: Normal rate and regular rhythm.      Heart sounds: Normal heart sounds. No murmur " heard.  Pulmonary:      Effort: Pulmonary effort is normal.      Breath sounds: Normal breath sounds. No wheezing or rales.   Abdominal:      General: Bowel sounds are normal. There is no distension.      Palpations: Abdomen is soft.      Tenderness: There is no abdominal tenderness.   Musculoskeletal:         General: No tenderness or deformity. Normal range of motion.      Cervical back: Normal range of motion and neck supple.      Lumbar back: Normal. No spasms.      Comments: Bends 90 degrees at  Waist, mild increase  In arm tone , good  rom arms  And  knees   Lymphadenopathy:      Cervical: No cervical adenopathy.   Skin:     General: Skin is warm and dry.      Findings: No rash.   Neurological:      Mental Status: She is alert and oriented to person, place, and time.      Cranial Nerves: No cranial nerve deficit.      Motor: No weakness.      Coordination: Coordination normal.      Gait: Gait normal.      Comments: Mild increase in arm tone   Psychiatric:         Mood and Affect: Mood normal.         Behavior: Behavior normal.         Thought Content: Thought content normal.         Judgment: Judgment normal.           Assessment:       1. Bipolar disorder, in full remission, most recent episode depressed    2. Menopause    3. Essential hypertension    4. High cholesterol    5. Parkinson disease    6. Stage 3a chronic kidney disease    7. Gastroesophageal reflux disease without esophagitis    8. Conductive hearing loss, bilateral         Plan:       Bipolar disorder, in full remission, most recent episode depressed  Doing well on low-dose lithium, lithium level less than 0.3  Menopause  -     FLUoxetine 10 MG capsule; Take 1 capsule (10 mg total) by mouth once daily.  Dispense: 90 capsule; Refill: 1  Continue Prozac  Essential hypertension  -     lisinopriL (PRINIVIL,ZESTRIL) 20 MG tablet; Take 1 tablet (20 mg total) by mouth once daily.  Dispense: 90 tablet; Refill: 1  Blood pressure well controlled  High  cholesterol  -     rosuvastatin (CRESTOR) 10 MG tablet; Take 1 tablet (10 mg total) by mouth once daily.  Dispense: 90 tablet; Refill: 1  Cholesterol 169 HDL 60  LDL 87 good lipid panel  Parkinson disease  Minimal increased tone in arms only  Stage 3a chronic kidney disease  GFR 40 creatinine 1.28 stable  Gastroesophageal reflux disease without esophagitis  -     omeprazole (PRILOSEC) 20 MG capsule; Take 1 capsule (20 mg total) by mouth once daily.  Dispense: 90 capsule; Refill: 1  Due to some dysphagia, restart omeprazole daily  Conductive hearing loss, bilateral  Right ear has a hearing aid,, needs a left hearing aid also    Follow up in about 6 months (around 11/5/2022), or bipolar.        5/5/2022 Max Landeros

## 2022-06-23 ENCOUNTER — LAB VISIT (OUTPATIENT)
Dept: LAB | Facility: HOSPITAL | Age: 77
End: 2022-06-23
Attending: INTERNAL MEDICINE
Payer: COMMERCIAL

## 2022-06-23 DIAGNOSIS — N18.30 CHRONIC KIDNEY DISEASE, STAGE III (MODERATE): Primary | ICD-10-CM

## 2022-06-23 DIAGNOSIS — E55.9 AVITAMINOSIS D: ICD-10-CM

## 2022-06-23 LAB
25(OH)D3+25(OH)D2 SERPL-MCNC: 55 NG/ML (ref 30–96)
ALBUMIN SERPL BCP-MCNC: 4.3 G/DL (ref 3.5–5.2)
ANION GAP SERPL CALC-SCNC: 8 MMOL/L (ref 8–16)
BACTERIA #/AREA URNS HPF: NEGATIVE /HPF
BUN SERPL-MCNC: 23 MG/DL (ref 8–23)
CALCIUM SERPL-MCNC: 9.6 MG/DL (ref 8.7–10.5)
CHLORIDE SERPL-SCNC: 107 MMOL/L (ref 95–110)
CO2 SERPL-SCNC: 23 MMOL/L (ref 23–29)
CREAT SERPL-MCNC: 1.2 MG/DL (ref 0.5–1.4)
CREAT UR-MCNC: 103 MG/DL (ref 15–325)
EST. GFR  (AFRICAN AMERICAN): 50 ML/MIN/1.73 M^2
EST. GFR  (NON AFRICAN AMERICAN): 43.4 ML/MIN/1.73 M^2
GLUCOSE SERPL-MCNC: 107 MG/DL (ref 70–110)
HYALINE CASTS #/AREA URNS LPF: 0 /LPF
MICROSCOPIC COMMENT: NORMAL
PHOSPHATE SERPL-MCNC: 3 MG/DL (ref 2.7–4.5)
POTASSIUM SERPL-SCNC: 4.1 MMOL/L (ref 3.5–5.1)
PROT UR-MCNC: 9 MG/DL (ref 6–15)
PROT/CREAT UR: 0.09 MG/G{CREAT} (ref 0–0.2)
RBC #/AREA URNS HPF: 1 /HPF (ref 0–4)
SODIUM SERPL-SCNC: 138 MMOL/L (ref 136–145)
SQUAMOUS #/AREA URNS HPF: 0 /HPF
WBC #/AREA URNS HPF: 1 /HPF (ref 0–5)

## 2022-06-23 PROCEDURE — 36415 COLL VENOUS BLD VENIPUNCTURE: CPT | Performed by: INTERNAL MEDICINE

## 2022-06-23 PROCEDURE — 82306 VITAMIN D 25 HYDROXY: CPT | Performed by: INTERNAL MEDICINE

## 2022-06-23 PROCEDURE — 80069 RENAL FUNCTION PANEL: CPT | Performed by: INTERNAL MEDICINE

## 2022-06-23 PROCEDURE — 81001 URINALYSIS AUTO W/SCOPE: CPT | Performed by: INTERNAL MEDICINE

## 2022-06-23 PROCEDURE — 82570 ASSAY OF URINE CREATININE: CPT | Performed by: INTERNAL MEDICINE

## 2022-09-06 ENCOUNTER — LAB VISIT (OUTPATIENT)
Dept: LAB | Facility: HOSPITAL | Age: 77
End: 2022-09-06
Attending: INTERNAL MEDICINE
Payer: COMMERCIAL

## 2022-09-06 DIAGNOSIS — E83.118 OTHER HEMOCHROMATOSIS: ICD-10-CM

## 2022-09-06 DIAGNOSIS — E83.118 LATENT HEMOCHROMATOSIS: Primary | ICD-10-CM

## 2022-09-06 LAB
BASOPHILS # BLD AUTO: 0.06 K/UL (ref 0–0.2)
BASOPHILS NFR BLD: 1.1 % (ref 0–1.9)
DIFFERENTIAL METHOD: NORMAL
EOSINOPHIL # BLD AUTO: 0.2 K/UL (ref 0–0.5)
EOSINOPHIL NFR BLD: 3.8 % (ref 0–8)
ERYTHROCYTE [DISTWIDTH] IN BLOOD BY AUTOMATED COUNT: 13 % (ref 11.5–14.5)
FERRITIN SERPL-MCNC: 206 NG/ML (ref 20–300)
HCT VFR BLD AUTO: 41 % (ref 37–48.5)
HGB BLD-MCNC: 13.6 G/DL (ref 12–16)
IMM GRANULOCYTES # BLD AUTO: 0.01 K/UL (ref 0–0.04)
IMM GRANULOCYTES NFR BLD AUTO: 0.2 % (ref 0–0.5)
LYMPHOCYTES # BLD AUTO: 1.4 K/UL (ref 1–4.8)
LYMPHOCYTES NFR BLD: 26.8 % (ref 18–48)
MCH RBC QN AUTO: 30.1 PG (ref 27–31)
MCHC RBC AUTO-ENTMCNC: 33.2 G/DL (ref 32–36)
MCV RBC AUTO: 91 FL (ref 82–98)
MONOCYTES # BLD AUTO: 0.4 K/UL (ref 0.3–1)
MONOCYTES NFR BLD: 7.7 % (ref 4–15)
NEUTROPHILS # BLD AUTO: 3.2 K/UL (ref 1.8–7.7)
NEUTROPHILS NFR BLD: 60.4 % (ref 38–73)
NRBC BLD-RTO: 0 /100 WBC
PLATELET # BLD AUTO: 238 K/UL (ref 150–450)
PMV BLD AUTO: 10 FL (ref 9.2–12.9)
RBC # BLD AUTO: 4.52 M/UL (ref 4–5.4)
WBC # BLD AUTO: 5.3 K/UL (ref 3.9–12.7)

## 2022-09-06 PROCEDURE — 36415 COLL VENOUS BLD VENIPUNCTURE: CPT | Performed by: INTERNAL MEDICINE

## 2022-09-06 PROCEDURE — 85025 COMPLETE CBC W/AUTO DIFF WBC: CPT | Performed by: INTERNAL MEDICINE

## 2022-09-06 PROCEDURE — 82728 ASSAY OF FERRITIN: CPT | Performed by: INTERNAL MEDICINE

## 2022-09-12 ENCOUNTER — OFFICE VISIT (OUTPATIENT)
Dept: HEMATOLOGY/ONCOLOGY | Facility: CLINIC | Age: 77
End: 2022-09-12
Payer: COMMERCIAL

## 2022-09-12 VITALS
SYSTOLIC BLOOD PRESSURE: 155 MMHG | HEIGHT: 66 IN | TEMPERATURE: 98 F | WEIGHT: 152 LBS | RESPIRATION RATE: 20 BRPM | HEART RATE: 70 BPM | BODY MASS INDEX: 24.43 KG/M2 | DIASTOLIC BLOOD PRESSURE: 78 MMHG

## 2022-09-12 DIAGNOSIS — E83.118 OTHER HEMOCHROMATOSIS: Primary | ICD-10-CM

## 2022-09-12 PROCEDURE — 1101F PT FALLS ASSESS-DOCD LE1/YR: CPT | Mod: CPTII,S$GLB,, | Performed by: INTERNAL MEDICINE

## 2022-09-12 PROCEDURE — 1159F MED LIST DOCD IN RCRD: CPT | Mod: CPTII,S$GLB,, | Performed by: INTERNAL MEDICINE

## 2022-09-12 PROCEDURE — 99214 PR OFFICE/OUTPT VISIT, EST, LEVL IV, 30-39 MIN: ICD-10-PCS | Mod: S$GLB,,, | Performed by: INTERNAL MEDICINE

## 2022-09-12 PROCEDURE — 3077F SYST BP >= 140 MM HG: CPT | Mod: CPTII,S$GLB,, | Performed by: INTERNAL MEDICINE

## 2022-09-12 PROCEDURE — 1126F AMNT PAIN NOTED NONE PRSNT: CPT | Mod: CPTII,S$GLB,, | Performed by: INTERNAL MEDICINE

## 2022-09-12 PROCEDURE — 1159F PR MEDICATION LIST DOCUMENTED IN MEDICAL RECORD: ICD-10-PCS | Mod: CPTII,S$GLB,, | Performed by: INTERNAL MEDICINE

## 2022-09-12 PROCEDURE — 3077F PR MOST RECENT SYSTOLIC BLOOD PRESSURE >= 140 MM HG: ICD-10-PCS | Mod: CPTII,S$GLB,, | Performed by: INTERNAL MEDICINE

## 2022-09-12 PROCEDURE — 3288F FALL RISK ASSESSMENT DOCD: CPT | Mod: CPTII,S$GLB,, | Performed by: INTERNAL MEDICINE

## 2022-09-12 PROCEDURE — 1101F PR PT FALLS ASSESS DOC 0-1 FALLS W/OUT INJ PAST YR: ICD-10-PCS | Mod: CPTII,S$GLB,, | Performed by: INTERNAL MEDICINE

## 2022-09-12 PROCEDURE — 3078F PR MOST RECENT DIASTOLIC BLOOD PRESSURE < 80 MM HG: ICD-10-PCS | Mod: CPTII,S$GLB,, | Performed by: INTERNAL MEDICINE

## 2022-09-12 PROCEDURE — 99214 OFFICE O/P EST MOD 30 MIN: CPT | Mod: S$GLB,,, | Performed by: INTERNAL MEDICINE

## 2022-09-12 PROCEDURE — 3078F DIAST BP <80 MM HG: CPT | Mod: CPTII,S$GLB,, | Performed by: INTERNAL MEDICINE

## 2022-09-12 PROCEDURE — 3288F PR FALLS RISK ASSESSMENT DOCUMENTED: ICD-10-PCS | Mod: CPTII,S$GLB,, | Performed by: INTERNAL MEDICINE

## 2022-09-12 PROCEDURE — 1126F PR PAIN SEVERITY QUANTIFIED, NO PAIN PRESENT: ICD-10-PCS | Mod: CPTII,S$GLB,, | Performed by: INTERNAL MEDICINE

## 2022-09-12 NOTE — PROGRESS NOTES
[facility] hematology oncology in office follow-up encounter progress note    22    Subjective:      Patient ID:   Payton Gutierrez  76 y.o. female  1945  [place]      Chief Complaint:   Iron disorder follow up    HPI:  76 y.o. female with diagnosis of increased iron stores, hemochromatosis treated with intermittent phlebotomy in the past. Ferritin is 117 , Hgb 14 now, phlebotomy is on hold. Her last therapeutic phlebotomy   was 5 years  Ago.    Ferritin went from 132 to 152 in 6 months.  Ferritin 206.  Hgb 13.6.  Resume phlebotomy schedule, orders written.    She has not had covid 19 infection.  She did take 2/2 vaccines.  She is undecided if she will take the booster vaccine.    She has history of GERD, glaucoma, hypercholesterolemia,hypertension, and has been diagnosed with Parkinson's disease.  She sees Dr. Spencer.  He tells me her Parkinson's symptoms have resolved, and she is off Parkinson medications.    She is status post tonsillectomy, appendectomy, complete hysterectomy.    She is a retired , she is allergic to codeine. She does not smoke, she does not drink alcohol with regularity.    Her dad passed away of a motor vehicle accident and her mother  of vaginal cancer. She has a brother with the hemochromatosis gene and increased ferritin on intermittent phlebotomy.    She denies gallbladder disease jaundice or hepatitis.  She is on lithium.  She is seeing Dr. Watts for renal dx.  She recently had diverticulitis.    2nd cousin Adry STARKS.    ROS:   GEN: normal without any fever, night sweats or weight loss  HEENT: normal with no HA's, sore throat, stiff neck, changes in vision  CV: normal with no CP, SOB, PND, SAUNDERS or orthopnea  PULM: normal with no SOB, cough, hemoptysis, sputum or pleuritic pain  GI: normal with no abdominal pain, nausea, vomiting, constipation, diarrhea, melanotic stools, BRBPR, or hematemesis  : normal with no hematuria, dysuria  BREAST: normal with no  "mass, discharge, pain  SKIN: normal with no rash, erythema, bruising, or swelling     Past Medical History:   Diagnosis Date    Glaucoma      Past Surgical History:   Procedure Laterality Date    CATARACT EXTRACTION  2016    HYSTERECTOMY         Review of patient's allergies indicates:   Allergen Reactions    Codeine     Sulfa (sulfonamide antibiotics)            Current Outpatient Medications:     ergocalciferol, vitamin D2, (VITAMIN D ORAL), Take by mouth., Disp: , Rfl:     FLUoxetine 10 MG capsule, Take 1 capsule (10 mg total) by mouth once daily., Disp: 90 capsule, Rfl: 1    lisinopriL (PRINIVIL,ZESTRIL) 20 MG tablet, Take 1 tablet (20 mg total) by mouth once daily., Disp: 90 tablet, Rfl: 1    LUMIGAN 0.01 % Drop, INSTILL 1 DROP BOTH EYES EVERY NIGHT, Disp: , Rfl:     omeprazole (PRILOSEC) 20 MG capsule, Take 1 capsule (20 mg total) by mouth once daily., Disp: 90 capsule, Rfl: 1    rosuvastatin (CRESTOR) 10 MG tablet, Take 1 tablet (10 mg total) by mouth once daily., Disp: 90 tablet, Rfl: 1    lithium carbonate 150 MG capsule, Take 1 capsule (150 mg total) by mouth once daily at 6am., Disp: 90 capsule, Rfl: 1          Objective:   Vitals:  Blood pressure (!) 155/78, pulse 70, temperature 98.1 °F (36.7 °C), resp. rate 20, height 5' 6" (1.676 m), weight 68.9 kg (152 lb).    Physical Examination:   GEN: no apparent distress, comfortable  HEAD: atraumatic and normocephalic  EYES: no pallor, no icterus  ENT: OMM, no pharyngeal erythema, external ears WNL; no nasal discharge; no thrush  NECK: no masses, thyroid normal, trachea midline, no LAD/LN's, supple  CV: RRR with no murmur; normal pulse; normal S1 and S2; no pedal edema  CHEST: Normal respiratory effort; CTAB; normal breath sounds; no wheeze or crackles  ABDOM: nontender and nondistended; soft;  no rebound/guarding, the liver and spleen are not palpable  MUSC/Skeletal: ROM normal; no crepitus; joints normal  EXTREM: no clubbing, cyanosis, inflammation or " swelling  SKIN: no rashes, lesions, ulcers, petechia  : no cvat  NEURO: grossly intact; motor/sensory WNL;  no tremors  PSYCH: normal mood, affect and behavior  LYMPH: normal cervical, supraclavicular, axillary and groin LN's  BREASTS:she left the bra on, I did not examine the breasts    Radiology/Diagnostic Studies:    Ferritin 152 to 206.  Hgb 13.  Stable.    Dr. Jain did colonoscopy on August 17, 2017, diverticular disease were found, biopsy was negative for colitis.      Assessment:   (1) 76 y.o. female with diagnosis of increased iron stores, or hemochromatosis. She is status post intermittent phlebotomy in the past. Ferritin level was normal at 152.  Now ferritin increased at 206,  Begin phlebotomy schedule to decrease to 100.    (2)other history includes hypertension, hypercholesterolemia, she will follow-up with Dr. Landeros for primary care.     (3)She has recently been diagnosed with Parkinson's disease and will follow-up with Dr. Spencer for that condition and Dr. Chapa for renal.    Return to clinic here in 6 months with CBC and ferritin.

## 2022-10-05 ENCOUNTER — OFFICE VISIT (OUTPATIENT)
Dept: FAMILY MEDICINE | Facility: CLINIC | Age: 77
End: 2022-10-05
Payer: COMMERCIAL

## 2022-10-05 ENCOUNTER — TELEPHONE (OUTPATIENT)
Dept: FAMILY MEDICINE | Facility: CLINIC | Age: 77
End: 2022-10-05

## 2022-10-05 VITALS
DIASTOLIC BLOOD PRESSURE: 76 MMHG | TEMPERATURE: 99 F | OXYGEN SATURATION: 98 % | WEIGHT: 154 LBS | HEART RATE: 80 BPM | BODY MASS INDEX: 24.75 KG/M2 | HEIGHT: 66 IN | SYSTOLIC BLOOD PRESSURE: 144 MMHG

## 2022-10-05 DIAGNOSIS — I10 ESSENTIAL HYPERTENSION: ICD-10-CM

## 2022-10-05 DIAGNOSIS — K57.92 DIVERTICULITIS: Primary | ICD-10-CM

## 2022-10-05 DIAGNOSIS — K21.9 GASTROESOPHAGEAL REFLUX DISEASE WITHOUT ESOPHAGITIS: ICD-10-CM

## 2022-10-05 DIAGNOSIS — F31.76 BIPOLAR DISORDER, IN FULL REMISSION, MOST RECENT EPISODE DEPRESSED: ICD-10-CM

## 2022-10-05 PROCEDURE — 99214 PR OFFICE/OUTPT VISIT, EST, LEVL IV, 30-39 MIN: ICD-10-PCS | Mod: S$GLB,,, | Performed by: PHYSICIAN ASSISTANT

## 2022-10-05 PROCEDURE — 3077F PR MOST RECENT SYSTOLIC BLOOD PRESSURE >= 140 MM HG: ICD-10-PCS | Mod: CPTII,S$GLB,, | Performed by: PHYSICIAN ASSISTANT

## 2022-10-05 PROCEDURE — 1159F PR MEDICATION LIST DOCUMENTED IN MEDICAL RECORD: ICD-10-PCS | Mod: CPTII,S$GLB,, | Performed by: PHYSICIAN ASSISTANT

## 2022-10-05 PROCEDURE — 99214 OFFICE O/P EST MOD 30 MIN: CPT | Mod: S$GLB,,, | Performed by: PHYSICIAN ASSISTANT

## 2022-10-05 PROCEDURE — 1160F PR REVIEW ALL MEDS BY PRESCRIBER/CLIN PHARMACIST DOCUMENTED: ICD-10-PCS | Mod: CPTII,S$GLB,, | Performed by: PHYSICIAN ASSISTANT

## 2022-10-05 PROCEDURE — 3078F DIAST BP <80 MM HG: CPT | Mod: CPTII,S$GLB,, | Performed by: PHYSICIAN ASSISTANT

## 2022-10-05 PROCEDURE — 3288F FALL RISK ASSESSMENT DOCD: CPT | Mod: CPTII,S$GLB,, | Performed by: PHYSICIAN ASSISTANT

## 2022-10-05 PROCEDURE — 3078F PR MOST RECENT DIASTOLIC BLOOD PRESSURE < 80 MM HG: ICD-10-PCS | Mod: CPTII,S$GLB,, | Performed by: PHYSICIAN ASSISTANT

## 2022-10-05 PROCEDURE — 3288F PR FALLS RISK ASSESSMENT DOCUMENTED: ICD-10-PCS | Mod: CPTII,S$GLB,, | Performed by: PHYSICIAN ASSISTANT

## 2022-10-05 PROCEDURE — 1101F PR PT FALLS ASSESS DOC 0-1 FALLS W/OUT INJ PAST YR: ICD-10-PCS | Mod: CPTII,S$GLB,, | Performed by: PHYSICIAN ASSISTANT

## 2022-10-05 PROCEDURE — 1101F PT FALLS ASSESS-DOCD LE1/YR: CPT | Mod: CPTII,S$GLB,, | Performed by: PHYSICIAN ASSISTANT

## 2022-10-05 PROCEDURE — 3077F SYST BP >= 140 MM HG: CPT | Mod: CPTII,S$GLB,, | Performed by: PHYSICIAN ASSISTANT

## 2022-10-05 PROCEDURE — 1160F RVW MEDS BY RX/DR IN RCRD: CPT | Mod: CPTII,S$GLB,, | Performed by: PHYSICIAN ASSISTANT

## 2022-10-05 PROCEDURE — 1159F MED LIST DOCD IN RCRD: CPT | Mod: CPTII,S$GLB,, | Performed by: PHYSICIAN ASSISTANT

## 2022-10-05 RX ORDER — METRONIDAZOLE 500 MG/1
500 TABLET ORAL 3 TIMES DAILY
Qty: 21 TABLET | Refills: 0 | Status: ON HOLD | OUTPATIENT
Start: 2022-10-05 | End: 2022-10-08 | Stop reason: HOSPADM

## 2022-10-05 RX ORDER — CIPROFLOXACIN 500 MG/1
500 TABLET ORAL EVERY 12 HOURS
Qty: 20 TABLET | Refills: 0 | Status: ON HOLD | OUTPATIENT
Start: 2022-10-05 | End: 2022-10-08 | Stop reason: HOSPADM

## 2022-10-05 NOTE — TELEPHONE ENCOUNTER
----- Message from Trena Wallace sent at 10/5/2022  8:39 AM CDT -----  Pt is having a diverticulitis attack and needs something called in for her. [facility] [address]. Pt #[phone]

## 2022-10-05 NOTE — PROGRESS NOTES
"  SUBJECTIVE:    Patient ID: Payton Gutierrez is a 77 y.o. female.    Chief Complaint: Diverticulitis (No bottles/Pt c/o of diverticulitis x 1 week/Decline flu/BG)    Patient is a 77-year-old female who presents today for a sick visit with a CC of "diverticulitis." This past Sunday evening, she experienced a slow, gradual onset of abdominal discomfort localized in the left lower quadrant.  The pain has been constantly present since and is gradually worsening.  It is now radiating into the right lower quadrant. It is described as a 4/10, sharp sensation that is exacerbated when eating, applying pressure to the bilateral lower quadrants, or when sitting up from the supine position.  She reports having a history of diverticulitis and feels as though this is the early onset of a diverticulitis flare-up.  She denies any fever, diarrhea, nausea, or vomiting.  Last BM was this past Sunday.    BP is noted to be elevated in office today.  Patient reports taking her lisinopril and being compliant with her treatment regimen.  She endorses this elevated BP is secondary to her pain.  She denies any CP, palpitations, dizziness, frequent headaches, or lightheadedness.    Lab Visit on 09/06/2022   Component Date Value Ref Range Status    Ferritin 09/06/2022 206  20.0 - 300.0 ng/mL Final    WBC 09/06/2022 5.30  3.90 - 12.70 K/uL Final    RBC 09/06/2022 4.52  4.00 - 5.40 M/uL Final    Hemoglobin 09/06/2022 13.6  12.0 - 16.0 g/dL Final    Hematocrit 09/06/2022 41.0  37.0 - 48.5 % Final    MCV 09/06/2022 91  82 - 98 fL Final    MCH 09/06/2022 30.1  27.0 - 31.0 pg Final    MCHC 09/06/2022 33.2  32.0 - 36.0 g/dL Final    RDW 09/06/2022 13.0  11.5 - 14.5 % Final    Platelets 09/06/2022 238  150 - 450 K/uL Final    MPV 09/06/2022 10.0  9.2 - 12.9 fL Final    Immature Granulocytes 09/06/2022 0.2  0.0 - 0.5 % Final    Gran # (ANC) 09/06/2022 3.2  1.8 - 7.7 K/uL Final    Immature Grans (Abs) 09/06/2022 0.01  0.00 - 0.04 K/uL Final    Lymph # " 09/06/2022 1.4  1.0 - 4.8 K/uL Final    Mono # 09/06/2022 0.4  0.3 - 1.0 K/uL Final    Eos # 09/06/2022 0.2  0.0 - 0.5 K/uL Final    Baso # 09/06/2022 0.06  0.00 - 0.20 K/uL Final    nRBC 09/06/2022 0  0 /100 WBC Final    Gran % 09/06/2022 60.4  38.0 - 73.0 % Final    Lymph % 09/06/2022 26.8  18.0 - 48.0 % Final    Mono % 09/06/2022 7.7  4.0 - 15.0 % Final    Eosinophil % 09/06/2022 3.8  0.0 - 8.0 % Final    Basophil % 09/06/2022 1.1  0.0 - 1.9 % Final    Differential Method 09/06/2022 Automated   Final   Lab Visit on 06/23/2022   Component Date Value Ref Range Status    Vit D, 25-Hydroxy 06/23/2022 55  30 - 96 ng/mL Final    Glucose 06/23/2022 107  70 - 110 mg/dL Final    Sodium 06/23/2022 138  136 - 145 mmol/L Final    Potassium 06/23/2022 4.1  3.5 - 5.1 mmol/L Final    Chloride 06/23/2022 107  95 - 110 mmol/L Final    CO2 06/23/2022 23  23 - 29 mmol/L Final    BUN 06/23/2022 23  8 - 23 mg/dL Final    Calcium 06/23/2022 9.6  8.7 - 10.5 mg/dL Final    Creatinine 06/23/2022 1.2  0.5 - 1.4 mg/dL Final    Albumin 06/23/2022 4.3  3.5 - 5.2 g/dL Final    Phosphorus 06/23/2022 3.0  2.7 - 4.5 mg/dL Final    eGFR if African American 06/23/2022 50.0 (A)  >60 mL/min/1.73 m^2 Final    eGFR if non African American 06/23/2022 43.4 (A)  >60 mL/min/1.73 m^2 Final    Anion Gap 06/23/2022 8  8 - 16 mmol/L Final    Protein, Urine Random 06/23/2022 9  6 - 15 mg/dL Final    Creatinine, Urine 06/23/2022 103.0  15.0 - 325.0 mg/dL Final    Prot/Creat Ratio, Urine 06/23/2022 0.09  0.00 - 0.20 Final    RBC, UA 06/23/2022 1  0 - 4 /hpf Final    WBC, UA 06/23/2022 1  0 - 5 /hpf Final    Bacteria 06/23/2022 Negative  None-Occ /hpf Final    Squam Epithel, UA 06/23/2022 0  /hpf Final    Hyaline Casts, UA 06/23/2022 0  0-1/lpf /lpf Final    Microscopic Comment 06/23/2022 SEE COMMENT   Final   Telephone on 05/02/2022   Component Date Value Ref Range Status    WBC 05/02/2022 4.6  3.8 - 10.8 Thousand/uL Final    RBC 05/02/2022 4.52  3.80 - 5.10  Million/uL Final    Hemoglobin 05/02/2022 13.5  11.7 - 15.5 g/dL Final    Hematocrit 05/02/2022 40.9  35.0 - 45.0 % Final    MCV 05/02/2022 90.5  80.0 - 100.0 fL Final    MCH 05/02/2022 29.9  27.0 - 33.0 pg Final    MCHC 05/02/2022 33.0  32.0 - 36.0 g/dL Final    RDW 05/02/2022 13.1  11.0 - 15.0 % Final    Platelets 05/02/2022 239  140 - 400 Thousand/uL Final    MPV 05/02/2022 10.0  7.5 - 12.5 fL Final    Neutrophils, Abs 05/02/2022 2,921  1,500 - 7,800 cells/uL Final    Lymph # 05/02/2022 1,141  850 - 3,900 cells/uL Final    Mono # 05/02/2022 359  200 - 950 cells/uL Final    Eos # 05/02/2022 129  15 - 500 cells/uL Final    Baso # 05/02/2022 51  0 - 200 cells/uL Final    Neutrophils Relative 05/02/2022 63.5  % Final    Lymph % 05/02/2022 24.8  % Final    Mono % 05/02/2022 7.8  % Final    Eosinophil % 05/02/2022 2.8  % Final    Basophil % 05/02/2022 1.1  % Final    Glucose 05/02/2022 94  65 - 99 mg/dL Final    BUN 05/02/2022 20  7 - 25 mg/dL Final    Creatinine 05/02/2022 1.28 (H)  0.60 - 0.93 mg/dL Final    eGFR if non African American 05/02/2022 40 (L)  > OR = 60 mL/min/1.73m2 Final    eGFR if African American 05/02/2022 46 (L)  > OR = 60 mL/min/1.73m2 Final    BUN/Creatinine Ratio 05/02/2022 16  6 - 22 (calc) Final    Sodium 05/02/2022 139  135 - 146 mmol/L Final    Potassium 05/02/2022 4.3  3.5 - 5.3 mmol/L Final    Chloride 05/02/2022 107  98 - 110 mmol/L Final    CO2 05/02/2022 23  20 - 32 mmol/L Final    Calcium 05/02/2022 9.9  8.6 - 10.4 mg/dL Final    Total Protein 05/02/2022 6.9  6.1 - 8.1 g/dL Final    Albumin 05/02/2022 4.4  3.6 - 5.1 g/dL Final    Globulin, Total 05/02/2022 2.5  1.9 - 3.7 g/dL (calc) Final    Albumin/Globulin Ratio 05/02/2022 1.8  1.0 - 2.5 (calc) Final    Total Bilirubin 05/02/2022 1.0  0.2 - 1.2 mg/dL Final    Alkaline Phosphatase 05/02/2022 121  37 - 153 U/L Final    AST 05/02/2022 16  10 - 35 U/L Final    ALT 05/02/2022 12  6 - 29 U/L Final    Cholesterol 05/02/2022 169  <200 mg/dL  Final    HDL 05/02/2022 60  > OR = 50 mg/dL Final    Triglycerides 05/02/2022 128  <150 mg/dL Final    LDL Cholesterol 05/02/2022 87  mg/dL (calc) Final    HDL/Cholesterol Ratio 05/02/2022 2.8  <5.0 (calc) Final    Non HDL Chol. (LDL+VLDL) 05/02/2022 109  <130 mg/dL (calc) Final    Color, UA 05/02/2022 YELLOW  YELLOW Final    Appearance, UA 05/02/2022 CLEAR  CLEAR Final    Specific Gravity, UA 05/02/2022 1.018  1.001 - 1.035 Final    pH, UA 05/02/2022 < OR = 5.0  5.0 - 8.0 Final    Glucose, UA 05/02/2022 NEGATIVE  NEGATIVE Final    Bilirubin, UA 05/02/2022 NEGATIVE  NEGATIVE Final    Ketones, UA 05/02/2022 NEGATIVE  NEGATIVE Final    Occult Blood UA 05/02/2022 TRACE (A)  NEGATIVE Final    Protein, UA 05/02/2022 NEGATIVE  NEGATIVE Final    Nitrite, UA 05/02/2022 NEGATIVE  NEGATIVE Final    Leukocytes, UA 05/02/2022 NEGATIVE  NEGATIVE Final    WBC Casts, UA 05/02/2022 0-5  < OR = 5 /HPF Final    RBC Casts, UA 05/02/2022 NONE SEEN  < OR = 2 /HPF Final    Squam Epithel, UA 05/02/2022 NONE SEEN  < OR = 5 /HPF Final    Bacteria, UA 05/02/2022 NONE SEEN  NONE SEEN /HPF Final    Hyaline Casts, UA 05/02/2022 NONE SEEN  NONE SEEN /LPF Final    Reflexive Urine Culture 05/02/2022    Final    Lithium Level 05/02/2022 <0.3 (L)  0.6 - 1.2 mmol/L Final    TSH 05/02/2022 1.40  0.40 - 4.50 mIU/L Final       Past Medical History:   Diagnosis Date    Glaucoma      Past Surgical History:   Procedure Laterality Date    CATARACT EXTRACTION  2016    HYSTERECTOMY       Family History   Problem Relation Age of Onset    Cancer Mother        Marital Status: Single  Alcohol History:  reports no history of alcohol use.  Tobacco History:  reports that she has never smoked. She has never used smokeless tobacco.  Drug History:  reports no history of drug use.    Health Maintenance Topics with due status: Not Due       Topic Last Completion Date    DEXA Scan 06/22/2020    Mammogram 01/04/2022    Lipid Panel 05/02/2022    Shingles Vaccine 08/29/2022      Immunization History   Administered Date(s) Administered    COVID-19, MRNA, LN-S, PF (Pfizer) (Purple Cap) 02/13/2021, 03/06/2021    Influenza - High Dose - PF (65 years and older) 12/07/2015, 09/08/2016, 09/12/2017, 10/15/2018, 10/08/2019    Influenza - Quadrivalent - High Dose - PF (65 years and older) 11/02/2020, 10/07/2021    Influenza - Trivalent (ADULT) 12/16/2010    Influenza - Trivalent - PF (ADULT) 01/18/2007    Pneumococcal Conjugate - 13 Valent 03/08/2017    Pneumococcal Polysaccharide - 23 Valent 09/02/2014    Td (ADULT) 03/20/2012       Review of patient's allergies indicates:   Allergen Reactions    Codeine     Sulfa (sulfonamide antibiotics)     Sulfur Other (See Comments)       Current Outpatient Medications:     ciprofloxacin HCl (CIPRO) 500 MG tablet, Take 1 tablet (500 mg total) by mouth every 12 (twelve) hours. for 10 days, Disp: 20 tablet, Rfl: 0    ergocalciferol, vitamin D2, (VITAMIN D ORAL), Take by mouth., Disp: , Rfl:     FLUoxetine 10 MG capsule, Take 1 capsule (10 mg total) by mouth once daily., Disp: 90 capsule, Rfl: 1    lisinopriL (PRINIVIL,ZESTRIL) 20 MG tablet, Take 1 tablet (20 mg total) by mouth once daily., Disp: 90 tablet, Rfl: 1    lithium carbonate 150 MG capsule, Take 1 capsule (150 mg total) by mouth once daily at 6am., Disp: 90 capsule, Rfl: 1    LUMIGAN 0.01 % Drop, INSTILL 1 DROP BOTH EYES EVERY NIGHT, Disp: , Rfl:     metroNIDAZOLE (FLAGYL) 500 MG tablet, Take 1 tablet (500 mg total) by mouth 3 (three) times daily. for 7 days, Disp: 21 tablet, Rfl: 0    omeprazole (PRILOSEC) 20 MG capsule, Take 1 capsule (20 mg total) by mouth once daily., Disp: 90 capsule, Rfl: 1    rosuvastatin (CRESTOR) 10 MG tablet, Take 1 tablet (10 mg total) by mouth once daily., Disp: 90 tablet, Rfl: 1    Review of Systems   Constitutional:  Positive for appetite change (decreased). Negative for activity change, chills, fatigue and fever.   Respiratory:  Negative for cough, shortness of breath  "and wheezing.    Cardiovascular:  Negative for chest pain and palpitations.   Gastrointestinal:  Positive for abdominal pain. Negative for abdominal distention, anal bleeding, blood in stool, constipation, diarrhea, nausea, rectal pain and vomiting.   Genitourinary:  Negative for difficulty urinating, dysuria, flank pain, frequency, hematuria and urgency.   Musculoskeletal:  Negative for arthralgias and myalgias.   Neurological:  Negative for dizziness, syncope, weakness and light-headedness.   Psychiatric/Behavioral:  Negative for behavioral problems.         Objective:      Vitals:    10/05/22 1553 10/05/22 1558 10/05/22 1632   BP: (!) 160/60 (!) 160/60 (!) 144/76   Pulse: 80     Temp: 98.6 °F (37 °C)     TempSrc: Oral     SpO2: 98%     Weight: 69.9 kg (154 lb)     Height: 5' 6" (1.676 m)       Physical Exam  Vitals and nursing note reviewed.   Constitutional:       General: She is not in acute distress.     Appearance: Normal appearance. She is not ill-appearing, toxic-appearing or diaphoretic.      Comments: Elderly WF sitting erect in an office chair in no acute distress.   HENT:      Head: Normocephalic and atraumatic.      Right Ear: External ear normal.      Left Ear: External ear normal.      Nose: No rhinorrhea.      Mouth/Throat:      Mouth: Mucous membranes are moist.      Pharynx: Oropharynx is clear.   Eyes:      General: No scleral icterus.     Conjunctiva/sclera: Conjunctivae normal.   Cardiovascular:      Rate and Rhythm: Normal rate and regular rhythm.      Pulses: Normal pulses.      Heart sounds: Normal heart sounds. No murmur heard.    No friction rub.   Pulmonary:      Effort: Pulmonary effort is normal.      Breath sounds: Normal breath sounds. No wheezing, rhonchi or rales.   Abdominal:      General: Abdomen is flat. Bowel sounds are normal. There is no distension.      Palpations: Abdomen is soft. There is no hepatomegaly, splenomegaly or mass.      Tenderness: There is abdominal tenderness " in the left lower quadrant. There is no right CVA tenderness, left CVA tenderness, guarding or rebound. Negative signs include Borja's sign, Rovsing's sign, McBurney's sign, psoas sign and obturator sign.      Hernia: No hernia is present.          Comments: Active bowel sounds appreciated on auscultation in all 4 quadrants and the epigastrium.   Musculoskeletal:      Cervical back: Normal range of motion.      Right lower leg: No edema.      Left lower leg: No edema.   Skin:     General: Skin is warm and dry.      Coloration: Skin is not jaundiced or pale.      Findings: No erythema or rash.   Neurological:      Mental Status: She is alert. Mental status is at baseline.      Cranial Nerves: No cranial nerve deficit.      Motor: No weakness.      Gait: Gait normal.   Psychiatric:         Mood and Affect: Mood normal.         Behavior: Behavior normal. Behavior is cooperative.         Thought Content: Thought content normal.         Judgment: Judgment normal.         Assessment:       1. Diverticulitis    2. Essential hypertension    3. Bipolar disorder, in full remission, most recent episode depressed    4. Gastroesophageal reflux disease without esophagitis           Plan:       Diverticulitis  Based on patient presentation, vital signs, and physical exam findings, strong suspicion for diverticulitis.  Begin bowel rest - follow a bland diet and increase fluid intake.  CBC with diff and BMP ordered today - results pending.  Start ciprofloxacin 500 mg b.i.d. x 10 days and Flagyl 500 mg t.i.d. x7 days.  If pain worsens contact the office or present to the ER immediately.  Follow-up in 1 week.  If pain persists, we will obtain an abdominal CT scan.  -     Basic Metabolic Panel; Future; Expected date: 10/05/2022  -     CBC Auto Differential; Future; Expected date: 10/05/2022  -     ciprofloxacin HCl (CIPRO) 500 MG tablet; Take 1 tablet (500 mg total) by mouth every 12 (twelve) hours. for 10 days  Dispense: 20 tablet;  Refill: 0  -     metroNIDAZOLE (FLAGYL) 500 MG tablet; Take 1 tablet (500 mg total) by mouth 3 (three) times daily. for 7 days  Dispense: 21 tablet; Refill: 0    Essential hypertension  BP elevated in office today.  Suspected cause is secondary to patient's current pain.  Recommended starting Amlodipine 5mg q.d. today, but patient declined - Patient states she does not want to take any more  medications.  Begin monitoring BP at home daily and provide a BP log on follow-up in 1 week.    Bipolar disorder, in full remission, most recent episode depressed    Gastroesophageal reflux disease without esophagitis    Follow up in about 1 week (around 10/12/2022) for Diverticulitis .        10/5/2022 Amarjit Villalobos PA-C

## 2022-10-06 ENCOUNTER — TELEPHONE (OUTPATIENT)
Dept: FAMILY MEDICINE | Facility: CLINIC | Age: 77
End: 2022-10-06

## 2022-10-06 NOTE — TELEPHONE ENCOUNTER
----- Message from Dotty Tamir sent at 10/6/2022 10:06 AM CDT -----  Pt threw up her medication that she took this morning. She took it on an empty stomach. She is still in a lot of pain and feels worse than she did yesterday .   She is in the office getting blood work done

## 2022-10-07 ENCOUNTER — HOSPITAL ENCOUNTER (INPATIENT)
Facility: HOSPITAL | Age: 77
LOS: 1 days | Discharge: HOME | DRG: 392 | End: 2022-10-08
Attending: EMERGENCY MEDICINE | Admitting: INTERNAL MEDICINE
Payer: COMMERCIAL

## 2022-10-07 DIAGNOSIS — R07.9 CHEST PAIN: ICD-10-CM

## 2022-10-07 DIAGNOSIS — Z78.9 FAILURE OF OUTPATIENT TREATMENT: ICD-10-CM

## 2022-10-07 DIAGNOSIS — K57.92 DIVERTICULITIS: Primary | ICD-10-CM

## 2022-10-07 LAB
ALBUMIN SERPL BCP-MCNC: 4 G/DL (ref 3.5–5.2)
ALP SERPL-CCNC: 117 U/L (ref 55–135)
ALT SERPL W/O P-5'-P-CCNC: 14 U/L (ref 10–44)
ANION GAP SERPL CALC-SCNC: 7 MMOL/L (ref 8–16)
AST SERPL-CCNC: 17 U/L (ref 10–40)
BASOPHILS # BLD AUTO: 0.05 K/UL (ref 0–0.2)
BASOPHILS # BLD AUTO: 54 CELLS/UL (ref 0–200)
BASOPHILS NFR BLD AUTO: 0.5 %
BASOPHILS NFR BLD: 0.5 % (ref 0–1.9)
BILIRUB SERPL-MCNC: 1.2 MG/DL (ref 0.1–1)
BILIRUB UR QL STRIP: NEGATIVE
BUN SERPL-MCNC: 16 MG/DL (ref 8–23)
BUN SERPL-MCNC: 21 MG/DL (ref 7–25)
BUN/CREAT SERPL: 16 (CALC) (ref 6–22)
CALCIUM SERPL-MCNC: 9.3 MG/DL (ref 8.7–10.5)
CALCIUM SERPL-MCNC: 9.4 MG/DL (ref 8.6–10.4)
CHLORIDE SERPL-SCNC: 107 MMOL/L (ref 98–110)
CHLORIDE SERPL-SCNC: 108 MMOL/L (ref 95–110)
CLARITY UR: CLEAR
CO2 SERPL-SCNC: 19 MMOL/L (ref 20–32)
CO2 SERPL-SCNC: 22 MMOL/L (ref 23–29)
COLOR UR: YELLOW
CREAT SERPL-MCNC: 1.1 MG/DL (ref 0.5–1.4)
CREAT SERPL-MCNC: 1.2 MG/DL (ref 0.5–1.4)
CREAT SERPL-MCNC: 1.32 MG/DL (ref 0.6–1)
DIFFERENTIAL METHOD: ABNORMAL
EGFR: 41 ML/MIN/1.73M2
EOSINOPHIL # BLD AUTO: 0.1 K/UL (ref 0–0.5)
EOSINOPHIL # BLD AUTO: 118 CELLS/UL (ref 15–500)
EOSINOPHIL NFR BLD AUTO: 1.1 %
EOSINOPHIL NFR BLD: 0.6 % (ref 0–8)
ERYTHROCYTE [DISTWIDTH] IN BLOOD BY AUTOMATED COUNT: 12.6 % (ref 11–15)
ERYTHROCYTE [DISTWIDTH] IN BLOOD BY AUTOMATED COUNT: 12.8 % (ref 11.5–14.5)
EST. GFR  (NO RACE VARIABLE): 51.1 ML/MIN/1.73 M^2
GLUCOSE SERPL-MCNC: 104 MG/DL (ref 65–99)
GLUCOSE SERPL-MCNC: 134 MG/DL (ref 70–110)
GLUCOSE UR QL STRIP: NEGATIVE
HCT VFR BLD AUTO: 36.5 % (ref 37–48.5)
HCT VFR BLD AUTO: 37.5 % (ref 35–45)
HGB BLD-MCNC: 12 G/DL (ref 12–16)
HGB BLD-MCNC: 12.4 G/DL (ref 11.7–15.5)
HGB UR QL STRIP: ABNORMAL
HYALINE CASTS #/AREA URNS LPF: 0 /LPF
IMM GRANULOCYTES # BLD AUTO: 0.03 K/UL (ref 0–0.04)
IMM GRANULOCYTES NFR BLD AUTO: 0.3 % (ref 0–0.5)
KETONES UR QL STRIP: ABNORMAL
LEUKOCYTE ESTERASE UR QL STRIP: ABNORMAL
LIPASE SERPL-CCNC: 30 U/L (ref 4–60)
LYMPHOCYTES # BLD AUTO: 1.2 K/UL (ref 1–4.8)
LYMPHOCYTES # BLD AUTO: 963 CELLS/UL (ref 850–3900)
LYMPHOCYTES NFR BLD AUTO: 9 %
LYMPHOCYTES NFR BLD: 12.6 % (ref 18–48)
MCH RBC QN AUTO: 29.7 PG (ref 27–31)
MCH RBC QN AUTO: 30 PG (ref 27–33)
MCHC RBC AUTO-ENTMCNC: 32.9 G/DL (ref 32–36)
MCHC RBC AUTO-ENTMCNC: 33.1 G/DL (ref 32–36)
MCV RBC AUTO: 90 FL (ref 82–98)
MCV RBC AUTO: 90.8 FL (ref 80–100)
MICROSCOPIC COMMENT: ABNORMAL
MONOCYTES # BLD AUTO: 0.7 K/UL (ref 0.3–1)
MONOCYTES # BLD AUTO: 663 CELLS/UL (ref 200–950)
MONOCYTES NFR BLD AUTO: 6.2 %
MONOCYTES NFR BLD: 7.1 % (ref 4–15)
NEUTROPHILS # BLD AUTO: 7.4 K/UL (ref 1.8–7.7)
NEUTROPHILS # BLD AUTO: 8902 CELLS/UL (ref 1500–7800)
NEUTROPHILS NFR BLD AUTO: 83.2 %
NEUTROPHILS NFR BLD: 78.9 % (ref 38–73)
NITRITE UR QL STRIP: NEGATIVE
NRBC BLD-RTO: 0 /100 WBC
PH UR STRIP: 5 [PH] (ref 5–8)
PLATELET # BLD AUTO: 230 THOUSAND/UL (ref 140–400)
PLATELET # BLD AUTO: 235 K/UL (ref 150–450)
PMV BLD AUTO: 10.1 FL (ref 9.2–12.9)
PMV BLD REES-ECKER: 10.3 FL (ref 7.5–12.5)
POTASSIUM SERPL-SCNC: 3.5 MMOL/L (ref 3.5–5.1)
POTASSIUM SERPL-SCNC: 4 MMOL/L (ref 3.5–5.3)
PROT SERPL-MCNC: 7.2 G/DL (ref 6–8.4)
PROT UR QL STRIP: NEGATIVE
RBC # BLD AUTO: 4.04 M/UL (ref 4–5.4)
RBC # BLD AUTO: 4.13 MILLION/UL (ref 3.8–5.1)
RBC #/AREA URNS HPF: 5 /HPF (ref 0–4)
SAMPLE: NORMAL
SODIUM SERPL-SCNC: 137 MMOL/L (ref 136–145)
SODIUM SERPL-SCNC: 141 MMOL/L (ref 135–146)
SP GR UR STRIP: >=1.03 (ref 1–1.03)
SQUAMOUS #/AREA URNS HPF: 0 /HPF
URN SPEC COLLECT METH UR: ABNORMAL
UROBILINOGEN UR STRIP-ACNC: NEGATIVE EU/DL
WBC # BLD AUTO: 10.7 THOUSAND/UL (ref 3.8–10.8)
WBC # BLD AUTO: 9.34 K/UL (ref 3.9–12.7)
WBC #/AREA URNS HPF: 2 /HPF (ref 0–5)

## 2022-10-07 PROCEDURE — 63600175 PHARM REV CODE 636 W HCPCS

## 2022-10-07 PROCEDURE — 80053 COMPREHEN METABOLIC PANEL: CPT | Performed by: EMERGENCY MEDICINE

## 2022-10-07 PROCEDURE — 25000003 PHARM REV CODE 250: Performed by: INTERNAL MEDICINE

## 2022-10-07 PROCEDURE — C9113 INJ PANTOPRAZOLE SODIUM, VIA: HCPCS | Performed by: EMERGENCY MEDICINE

## 2022-10-07 PROCEDURE — 25000003 PHARM REV CODE 250: Performed by: EMERGENCY MEDICINE

## 2022-10-07 PROCEDURE — 83690 ASSAY OF LIPASE: CPT | Performed by: EMERGENCY MEDICINE

## 2022-10-07 PROCEDURE — 63600175 PHARM REV CODE 636 W HCPCS: Performed by: INTERNAL MEDICINE

## 2022-10-07 PROCEDURE — 99223 1ST HOSP IP/OBS HIGH 75: CPT | Mod: ,,, | Performed by: SURGERY

## 2022-10-07 PROCEDURE — 81001 URINALYSIS AUTO W/SCOPE: CPT | Performed by: EMERGENCY MEDICINE

## 2022-10-07 PROCEDURE — 99223 PR INITIAL HOSPITAL CARE,LEVL III: ICD-10-PCS | Mod: ,,, | Performed by: SURGERY

## 2022-10-07 PROCEDURE — 85025 COMPLETE CBC W/AUTO DIFF WBC: CPT | Performed by: EMERGENCY MEDICINE

## 2022-10-07 PROCEDURE — 96375 TX/PRO/DX INJ NEW DRUG ADDON: CPT

## 2022-10-07 PROCEDURE — 99285 EMERGENCY DEPT VISIT HI MDM: CPT | Mod: 25

## 2022-10-07 PROCEDURE — 12000002 HC ACUTE/MED SURGE SEMI-PRIVATE ROOM

## 2022-10-07 PROCEDURE — 25500020 PHARM REV CODE 255: Performed by: EMERGENCY MEDICINE

## 2022-10-07 PROCEDURE — 96365 THER/PROPH/DIAG IV INF INIT: CPT

## 2022-10-07 PROCEDURE — 63600175 PHARM REV CODE 636 W HCPCS: Performed by: EMERGENCY MEDICINE

## 2022-10-07 RX ORDER — ONDANSETRON 2 MG/ML
4 INJECTION INTRAMUSCULAR; INTRAVENOUS EVERY 6 HOURS PRN
Status: DISCONTINUED | OUTPATIENT
Start: 2022-10-07 | End: 2022-10-08 | Stop reason: HOSPADM

## 2022-10-07 RX ORDER — LATANOPROST 50 UG/ML
1 SOLUTION/ DROPS OPHTHALMIC NIGHTLY
Status: DISCONTINUED | OUTPATIENT
Start: 2022-10-07 | End: 2022-10-08 | Stop reason: HOSPADM

## 2022-10-07 RX ORDER — LITHIUM CARBONATE 150 MG/1
150 CAPSULE ORAL DAILY
Status: DISCONTINUED | OUTPATIENT
Start: 2022-10-07 | End: 2022-10-08 | Stop reason: HOSPADM

## 2022-10-07 RX ORDER — IBUPROFEN 200 MG
24 TABLET ORAL
Status: DISCONTINUED | OUTPATIENT
Start: 2022-10-07 | End: 2022-10-08 | Stop reason: HOSPADM

## 2022-10-07 RX ORDER — LISINOPRIL 20 MG/1
20 TABLET ORAL DAILY
Status: DISCONTINUED | OUTPATIENT
Start: 2022-10-07 | End: 2022-10-08 | Stop reason: HOSPADM

## 2022-10-07 RX ORDER — LATANOPROST 50 UG/ML
1 SOLUTION/ DROPS OPHTHALMIC DAILY
Status: DISCONTINUED | OUTPATIENT
Start: 2022-10-07 | End: 2022-10-07

## 2022-10-07 RX ORDER — ONDANSETRON 2 MG/ML
8 INJECTION INTRAMUSCULAR; INTRAVENOUS
Status: COMPLETED | OUTPATIENT
Start: 2022-10-07 | End: 2022-10-07

## 2022-10-07 RX ORDER — FLUOXETINE 10 MG/1
10 CAPSULE ORAL DAILY
Status: DISCONTINUED | OUTPATIENT
Start: 2022-10-07 | End: 2022-10-08 | Stop reason: HOSPADM

## 2022-10-07 RX ORDER — GLUCAGON 1 MG
1 KIT INJECTION
Status: DISCONTINUED | OUTPATIENT
Start: 2022-10-07 | End: 2022-10-08 | Stop reason: HOSPADM

## 2022-10-07 RX ORDER — SODIUM CHLORIDE 9 MG/ML
INJECTION, SOLUTION INTRAVENOUS CONTINUOUS
Status: DISCONTINUED | OUTPATIENT
Start: 2022-10-07 | End: 2022-10-08 | Stop reason: HOSPADM

## 2022-10-07 RX ORDER — MORPHINE SULFATE 4 MG/ML
4 INJECTION, SOLUTION INTRAMUSCULAR; INTRAVENOUS
Status: COMPLETED | OUTPATIENT
Start: 2022-10-07 | End: 2022-10-07

## 2022-10-07 RX ORDER — PANTOPRAZOLE SODIUM 40 MG/10ML
40 INJECTION, POWDER, LYOPHILIZED, FOR SOLUTION INTRAVENOUS
Status: COMPLETED | OUTPATIENT
Start: 2022-10-07 | End: 2022-10-07

## 2022-10-07 RX ORDER — SODIUM CHLORIDE 0.9 % (FLUSH) 0.9 %
10 SYRINGE (ML) INJECTION EVERY 12 HOURS PRN
Status: DISCONTINUED | OUTPATIENT
Start: 2022-10-07 | End: 2022-10-08 | Stop reason: HOSPADM

## 2022-10-07 RX ORDER — ONDANSETRON 2 MG/ML
INJECTION INTRAMUSCULAR; INTRAVENOUS
Status: COMPLETED
Start: 2022-10-07 | End: 2022-10-07

## 2022-10-07 RX ORDER — IBUPROFEN 200 MG
16 TABLET ORAL
Status: DISCONTINUED | OUTPATIENT
Start: 2022-10-07 | End: 2022-10-08 | Stop reason: HOSPADM

## 2022-10-07 RX ADMIN — ONDANSETRON 8 MG: 2 INJECTION INTRAMUSCULAR; INTRAVENOUS at 07:10

## 2022-10-07 RX ADMIN — IOHEXOL 100 ML: 350 INJECTION, SOLUTION INTRAVENOUS at 07:10

## 2022-10-07 RX ADMIN — FLUOXETINE 10 MG: 10 CAPSULE ORAL at 11:10

## 2022-10-07 RX ADMIN — LISINOPRIL 20 MG: 20 TABLET ORAL at 11:10

## 2022-10-07 RX ADMIN — PIPERACILLIN SODIUM AND TAZOBACTAM SODIUM 3.38 G: 3; .375 INJECTION, POWDER, LYOPHILIZED, FOR SOLUTION INTRAVENOUS at 11:10

## 2022-10-07 RX ADMIN — MORPHINE SULFATE 4 MG: 4 INJECTION, SOLUTION INTRAMUSCULAR; INTRAVENOUS at 07:10

## 2022-10-07 RX ADMIN — PANTOPRAZOLE SODIUM 40 MG: 40 INJECTION, POWDER, FOR SOLUTION INTRAVENOUS at 07:10

## 2022-10-07 RX ADMIN — PIPERACILLIN AND TAZOBACTAM 4.5 G: 4; .5 INJECTION, POWDER, LYOPHILIZED, FOR SOLUTION INTRAVENOUS; PARENTERAL at 07:10

## 2022-10-07 RX ADMIN — PIPERACILLIN SODIUM AND TAZOBACTAM SODIUM 3.38 G: 3; .375 INJECTION, POWDER, LYOPHILIZED, FOR SOLUTION INTRAVENOUS at 04:10

## 2022-10-07 RX ADMIN — SODIUM CHLORIDE: 0.9 INJECTION, SOLUTION INTRAVENOUS at 10:10

## 2022-10-07 RX ADMIN — LITHIUM CARBONATE 150 MG: 150 CAPSULE ORAL at 11:10

## 2022-10-07 RX ADMIN — LATANOPROST 1 DROP: 50 SOLUTION OPHTHALMIC at 08:10

## 2022-10-07 NOTE — H&P
[facility] Medicine  History & Physical    Patient Name: Payton Gutierrez  MRN: [id number]  Patient Class: IP- Inpatient  Admission Date: 10/7/2022  Attending Physician: Jaren Arnold MD   Primary Care Provider: Max Landeros MD         Patient information was obtained from patient and ER records.     Subjective:     Principal Problem:Diverticulitis    Chief Complaint:   Chief Complaint   Patient presents with    Abdominal Pain     Pt recently dx with diverticulitis and states she has been vomiting from the antibiotics she is taking    Constipation        HPI: 77-year-old female with previous history of diverticulitis 4 times came to the emergency room with non resolving abdominal pain.  She was being treated by a provider 2 days ago with Cipro and Flagyl.  But she still having abdominal pain not improving not tolerating the diet and associated with nausea and vomiting and came to the hospital.  She never had diarrhea previous 3 days and no blood in the stool and no fever reported.  Lab workup with no leukocytosis and afebrile.  CT scan of the abdomen was done with acute sigmoid diverticulitis with no signs of perforations and admitted.  On  examination, patient is afebrile and minimal tenderness in the abdomen and soft.          Past Medical History:   Diagnosis Date    Glaucoma        Past Surgical History:   Procedure Laterality Date    CATARACT EXTRACTION  2016    HYSTERECTOMY         Review of patient's allergies indicates:   Allergen Reactions    Codeine     Sulfa (sulfonamide antibiotics)     Sulfur Other (See Comments)       No current facility-administered medications on file prior to encounter.     Current Outpatient Medications on File Prior to Encounter   Medication Sig    ergocalciferol, vitamin D2, (VITAMIN D ORAL) Take 1 tablet by mouth once daily.    FLUoxetine 10 MG capsule Take 1 capsule (10 mg total) by mouth once daily.    lisinopriL (PRINIVIL,ZESTRIL) 20 MG  tablet Take 1 tablet (20 mg total) by mouth once daily.    lithium carbonate 150 MG capsule Take 1 capsule (150 mg total) by mouth once daily at 6am.    LUMIGAN 0.01 % Drop Place 1 drop into both eyes every evening.    omeprazole (PRILOSEC) 20 MG capsule Take 1 capsule (20 mg total) by mouth once daily.    rosuvastatin (CRESTOR) 10 MG tablet Take 1 tablet (10 mg total) by mouth once daily.    ciprofloxacin HCl (CIPRO) 500 MG tablet Take 1 tablet (500 mg total) by mouth every 12 (twelve) hours. for 10 days    metroNIDAZOLE (FLAGYL) 500 MG tablet Take 1 tablet (500 mg total) by mouth 3 (three) times daily. for 7 days     Family History       Problem Relation (Age of Onset)    Cancer Mother          Tobacco Use    Smoking status: Never    Smokeless tobacco: Never   Substance and Sexual Activity    Alcohol use: No    Drug use: No    Sexual activity: Not on file     Review of Systems   Constitutional:  Negative for activity change and fever.   Respiratory:  Negative for shortness of breath and wheezing.    Cardiovascular:  Negative for chest pain and leg swelling.   Gastrointestinal:  Positive for abdominal pain. Negative for abdominal distention.   Genitourinary:  Negative for difficulty urinating.   Skin:  Negative for color change.   Neurological:  Negative for dizziness and facial asymmetry.   Psychiatric/Behavioral:  Negative for agitation and confusion.    Objective:     Vital Signs (Most Recent):  Temp: 97.5 °F (36.4 °C) (10/07/22 1233)  Pulse: 62 (10/07/22 1233)  Resp: 16 (10/07/22 1233)  BP: 136/61 (10/07/22 1233)  SpO2: 97 % (10/07/22 1233) Vital Signs (24h Range):  Temp:  [97.5 °F (36.4 °C)-98.2 °F (36.8 °C)] 97.5 °F (36.4 °C)  Pulse:  [62-75] 62  Resp:  [14-46] 16  SpO2:  [96 %-100 %] 97 %  BP: (136-193)/(56-75) 136/61     Weight: 69.2 kg (152 lb 8.9 oz)  Body mass index is 24.62 kg/m².    Physical Exam  Vitals and nursing note reviewed.   HENT:      Head: Normocephalic and atraumatic.      Nose:  Nose normal.   Eyes:      Conjunctiva/sclera: Conjunctivae normal.   Neck:      Vascular: No JVD.   Cardiovascular:      Rate and Rhythm: Normal rate.      Heart sounds: Normal heart sounds.   Pulmonary:      Effort: Pulmonary effort is normal.      Breath sounds: Normal breath sounds.   Abdominal:      General: Bowel sounds are normal.      Palpations: Abdomen is soft.   Skin:     General: Skin is warm.   Neurological:      Mental Status: She is alert and oriented to person, place, and time.   Psychiatric:         Mood and Affect: Mood normal.           Significant Labs: All pertinent labs within the past 24 hours have been reviewed.  CBC:   Recent Labs   Lab 10/06/22  1013 10/07/22  0623   WBC 10.7 9.34   HGB 12.4 12.0   HCT 37.5 36.5*    235     CMP:   Recent Labs   Lab 10/06/22  1013 10/07/22  0623    137   K 4.0 3.5    108   CO2 19* 22*   * 134*   BUN 21 16   CREATININE 1.32* 1.1   CALCIUM 9.4 9.3   PROT  --  7.2   ALBUMIN  --  4.0   BILITOT  --  1.2*   ALKPHOS  --  117   AST  --  17   ALT  --  14   ANIONGAP  --  7*     Cardiac Markers: No results for input(s): CKMB, MYOGLOBIN, BNP, TROPISTAT in the last 48 hours.    Significant Imaging: I have reviewed all pertinent imaging results/findings within the past 24 hours.    Assessment/Plan:     * Diverticulitis failed outpatient treatment   Clears   Zosyn   IVF   Monitor leucocytosis   Because of repeated episodes , consult surgeon for surgical opinion   Patient is not very keen for surgery though        Bipolar disorder, in full remission, most recent episode depressed  aware      CKD (chronic kidney disease) stage 3, GFR 30-59 ml/min  aware      Gastroesophageal reflux disease without esophagitis  Home medds      High cholesterol    Home meds    Essential hypertension    Continue home meds    Parkinson disease    She said control should diagnosis  Not on any medications and no tremor      VTE Risk Mitigation (From admission, onward)          Ordered     IP VTE HIGH RISK PATIENT  Once         10/07/22 0857     Place sequential compression device  Until discontinued         10/07/22 0857                   Jaren Arnold MD  Department of Hospital Medicine   [facility]

## 2022-10-07 NOTE — SUBJECTIVE & OBJECTIVE
No current facility-administered medications on file prior to encounter.     Current Outpatient Medications on File Prior to Encounter   Medication Sig    ergocalciferol, vitamin D2, (VITAMIN D ORAL) Take 1 tablet by mouth once daily.    FLUoxetine 10 MG capsule Take 1 capsule (10 mg total) by mouth once daily.    lisinopriL (PRINIVIL,ZESTRIL) 20 MG tablet Take 1 tablet (20 mg total) by mouth once daily.    lithium carbonate 150 MG capsule Take 1 capsule (150 mg total) by mouth once daily at 6am.    LUMIGAN 0.01 % Drop Place 1 drop into both eyes every evening.    omeprazole (PRILOSEC) 20 MG capsule Take 1 capsule (20 mg total) by mouth once daily.    rosuvastatin (CRESTOR) 10 MG tablet Take 1 tablet (10 mg total) by mouth once daily.    ciprofloxacin HCl (CIPRO) 500 MG tablet Take 1 tablet (500 mg total) by mouth every 12 (twelve) hours. for 10 days    metroNIDAZOLE (FLAGYL) 500 MG tablet Take 1 tablet (500 mg total) by mouth 3 (three) times daily. for 7 days       Review of patient's allergies indicates:   Allergen Reactions    Codeine     Sulfa (sulfonamide antibiotics)     Sulfur Other (See Comments)       Past Medical History:   Diagnosis Date    Glaucoma      Past Surgical History:   Procedure Laterality Date    CATARACT EXTRACTION  2016    HYSTERECTOMY       Family History       Problem Relation (Age of Onset)    Cancer Mother          Tobacco Use    Smoking status: Never    Smokeless tobacco: Never   Substance and Sexual Activity    Alcohol use: No    Drug use: No    Sexual activity: Not on file     Review of Systems   Constitutional:  Negative for activity change and fever.   Respiratory:  Negative for shortness of breath and wheezing.    Cardiovascular:  Negative for chest pain and leg swelling.   Gastrointestinal:  Positive for abdominal pain. Negative for abdominal distention.   Genitourinary:  Negative for difficulty urinating.   Skin:  Negative for color change.   Neurological:  Negative for dizziness  and facial asymmetry.   Psychiatric/Behavioral:  Negative for agitation and confusion.    Objective:     Vital Signs (Most Recent):  Temp: 97.5 °F (36.4 °C) (10/07/22 1233)  Pulse: 62 (10/07/22 1233)  Resp: 16 (10/07/22 1233)  BP: 136/61 (10/07/22 1233)  SpO2: 97 % (10/07/22 1233) Vital Signs (24h Range):  Temp:  [97.5 °F (36.4 °C)-98.2 °F (36.8 °C)] 97.5 °F (36.4 °C)  Pulse:  [62-75] 62  Resp:  [14-46] 16  SpO2:  [96 %-100 %] 97 %  BP: (136-193)/(56-75) 136/61     Weight: 69.2 kg (152 lb 8.9 oz)  Body mass index is 24.62 kg/m².    Physical Exam  Vitals and nursing note reviewed.   HENT:      Head: Normocephalic and atraumatic.      Nose: Nose normal.   Eyes:      Conjunctiva/sclera: Conjunctivae normal.   Neck:      Vascular: No JVD.   Cardiovascular:      Rate and Rhythm: Normal rate.      Heart sounds: Normal heart sounds.   Pulmonary:      Effort: Pulmonary effort is normal.      Breath sounds: Normal breath sounds.   Abdominal:      General: Bowel sounds are normal.      Palpations: Abdomen is soft.   Skin:     General: Skin is warm.   Neurological:      Mental Status: She is alert and oriented to person, place, and time.   Psychiatric:         Mood and Affect: Mood normal.       Significant Labs:  CBC:   Recent Labs   Lab 10/07/22  0623   WBC 9.34   RBC 4.04   HGB 12.0   HCT 36.5*      MCV 90   MCH 29.7   MCHC 32.9     CMP  Sodium   Date Value Ref Range Status   10/07/2022 137 136 - 145 mmol/L Final     Potassium   Date Value Ref Range Status   10/07/2022 3.5 3.5 - 5.1 mmol/L Final     Chloride   Date Value Ref Range Status   10/07/2022 108 95 - 110 mmol/L Final     CO2   Date Value Ref Range Status   10/07/2022 22 (L) 23 - 29 mmol/L Final     Glucose   Date Value Ref Range Status   10/07/2022 134 (H) 70 - 110 mg/dL Final     BUN   Date Value Ref Range Status   10/07/2022 16 8 - 23 mg/dL Final     Creatinine   Date Value Ref Range Status   10/07/2022 1.1 0.5 - 1.4 mg/dL Final     Calcium   Date Value  Ref Range Status   10/07/2022 9.3 8.7 - 10.5 mg/dL Final     Total Protein   Date Value Ref Range Status   10/07/2022 7.2 6.0 - 8.4 g/dL Final     Albumin   Date Value Ref Range Status   10/07/2022 4.0 3.5 - 5.2 g/dL Final     Total Bilirubin   Date Value Ref Range Status   10/07/2022 1.2 (H) 0.1 - 1.0 mg/dL Final     Comment:     For infants and newborns, interpretation of results should be based  on gestational age, weight and in agreement with clinical  observations.    Premature Infant recommended reference ranges:  Up to 24 hours.............<8.0 mg/dL  Up to 48 hours............<12.0 mg/dL  3-5 days..................<15.0 mg/dL  6-29 days.................<15.0 mg/dL       Alkaline Phosphatase   Date Value Ref Range Status   10/07/2022 117 55 - 135 U/L Final     AST   Date Value Ref Range Status   10/07/2022 17 10 - 40 U/L Final     ALT   Date Value Ref Range Status   10/07/2022 14 10 - 44 U/L Final     Anion Gap   Date Value Ref Range Status   10/07/2022 7 (L) 8 - 16 mmol/L Final     eGFR if    Date Value Ref Range Status   06/23/2022 50.0 (A) >60 mL/min/1.73 m^2 Final     eGFR if non    Date Value Ref Range Status   06/23/2022 43.4 (A) >60 mL/min/1.73 m^2 Final     Comment:     Calculation used to obtain the estimated glomerular filtration  rate (eGFR) is the CKD-EPI equation.            Significant Diagnostics:  I have reviewed all pertinent imaging results/findings within the past 24 hours.      CT reviewed- acute sigmoid diverticulitis, no abscess, no perforation, images independently reviewed

## 2022-10-07 NOTE — HPI
77-year-old female with previous history of diverticulitis 4 times came to the emergency room with non resolving abdominal pain.  She was being treated by a provider 2 days ago with Cipro and Flagyl.  But she still having abdominal pain not improving not tolerating the diet and associated with nausea and vomiting and came to the hospital.  She never had diarrhea previous 3 days and no blood in the stool and no fever reported.  Lab workup with no leukocytosis and afebrile.  CT scan of the abdomen was done with acute sigmoid diverticulitis with no signs of perforations and admitted.  On  examination, patient is afebrile and minimal tenderness in the abdomen and soft.

## 2022-10-07 NOTE — ASSESSMENT & PLAN NOTE
Arline   Zosyn   IVF   Monitor leucocytosis   Because of repeated episodes , consult surgeon for surgical opinion   Patient is not very keen for surgery though

## 2022-10-07 NOTE — ASSESSMENT & PLAN NOTE
Pain improving already  Continue antibiotics iv for now    No obvious need for surgical management but will follow    Discussed with primary team

## 2022-10-07 NOTE — PLAN OF CARE
[facility]  Initial Discharge Assessment       Primary Care Provider: Max Landeros MD    Admission Diagnosis: Diverticulitis [K57.92]    Admission Date: 10/7/2022  Expected Discharge Date:     Patient asleep spoke with patient first cousin betzaida.  Betzaida verified information on face sheet.  No dialysis, no coumadin, no nebulizer.  Betzaida stated someone from the family or the patient friend Trena will transport the patient home upon discharge.  No needs identified.    Discharge Barriers Identified: None    Payor: BLUE CR[employer] BLUE [employer]IELD / Plan: [place] [employer] E[place] / Product Type: Commercial /     Extended Emergency Contact Information  Primary Emergency Contact: Abadie,Renee  Address: [address] [place] [address] [place] [address] [facility] [place]  Home Phone: [phone]  Relation: Other    Discharge Plan A: Home  Discharge Plan B: Home      [id number] [place] - [address]  [address]  [place] [address]  Phone: [phone] Fax: [phone]    [id number] - [place] - [address] AT [facility] [address]  [address]  [place] [address]  Phone: [phone] Fax: [phone]      Initial Assessment (most recent)       Adult Discharge Assessment - 10/07/22 1607          Discharge Assessment    Assessment Type Discharge Planning Assessment     Confirmed/corrected address, phone number and insurance Yes     Source of Information family     Does patient/caregiver understand observation status --   n/a    Communicated LAILA with patient/caregiver Date not available/Unable to determine     Reason For Admission Diverticulitis failed outpatient treatment     Lives With alone     Facility Arrived From: home     Do you expect to return to your current living situation? Yes     Do you have help at home or someone to help you manage your care at home? No     Prior to hospitilization cognitive status: Unable to Assess     Current cognitive  status: Alert/Oriented     Walking or Climbing Stairs Difficulty none     Dressing/Bathing Difficulty none     Home Layout Bathroom on 2nd floor     Equipment Currently Used at Home walker, rolling     Patient currently being followed by outpatient case management? No     Do you currently have service(s) that help you manage your care at home? No     Do you take prescription medications? Yes     Do you have prescription coverage? Yes     Coverage Payor:  [facility] BLUE [facility] - BCBS OF [facility]     Do you have any problems affording any of your prescribed medications? No     Is the patient taking medications as prescribed? yes     Who is going to help you get home at discharge? family or friend Trena     How do you get to doctors appointments? car, drives self     Are you on dialysis? No     Do you take coumadin? No     Discharge Plan A Home     Discharge Plan B Home     DME Needed Upon Discharge  none     Discharge Plan discussed with: --   first cousin Betzaida    Discharge Barriers Identified None

## 2022-10-07 NOTE — SUBJECTIVE & OBJECTIVE
Past Medical History:   Diagnosis Date    Glaucoma        Past Surgical History:   Procedure Laterality Date    CATARACT EXTRACTION  2016    HYSTERECTOMY         Review of patient's allergies indicates:   Allergen Reactions    Codeine     Sulfa (sulfonamide antibiotics)     Sulfur Other (See Comments)       No current facility-administered medications on file prior to encounter.     Current Outpatient Medications on File Prior to Encounter   Medication Sig    ergocalciferol, vitamin D2, (VITAMIN D ORAL) Take 1 tablet by mouth once daily.    FLUoxetine 10 MG capsule Take 1 capsule (10 mg total) by mouth once daily.    lisinopriL (PRINIVIL,ZESTRIL) 20 MG tablet Take 1 tablet (20 mg total) by mouth once daily.    lithium carbonate 150 MG capsule Take 1 capsule (150 mg total) by mouth once daily at 6am.    LUMIGAN 0.01 % Drop Place 1 drop into both eyes every evening.    omeprazole (PRILOSEC) 20 MG capsule Take 1 capsule (20 mg total) by mouth once daily.    rosuvastatin (CRESTOR) 10 MG tablet Take 1 tablet (10 mg total) by mouth once daily.    ciprofloxacin HCl (CIPRO) 500 MG tablet Take 1 tablet (500 mg total) by mouth every 12 (twelve) hours. for 10 days    metroNIDAZOLE (FLAGYL) 500 MG tablet Take 1 tablet (500 mg total) by mouth 3 (three) times daily. for 7 days     Family History       Problem Relation (Age of Onset)    Cancer Mother          Tobacco Use    Smoking status: Never    Smokeless tobacco: Never   Substance and Sexual Activity    Alcohol use: No    Drug use: No    Sexual activity: Not on file     Review of Systems   Constitutional:  Negative for activity change and fever.   Respiratory:  Negative for shortness of breath and wheezing.    Cardiovascular:  Negative for chest pain and leg swelling.   Gastrointestinal:  Positive for abdominal pain. Negative for abdominal distention.   Genitourinary:  Negative for difficulty urinating.   Skin:  Negative for color change.   Neurological:  Negative for  dizziness and facial asymmetry.   Psychiatric/Behavioral:  Negative for agitation and confusion.    Objective:     Vital Signs (Most Recent):  Temp: 97.5 °F (36.4 °C) (10/07/22 1233)  Pulse: 62 (10/07/22 1233)  Resp: 16 (10/07/22 1233)  BP: 136/61 (10/07/22 1233)  SpO2: 97 % (10/07/22 1233) Vital Signs (24h Range):  Temp:  [97.5 °F (36.4 °C)-98.2 °F (36.8 °C)] 97.5 °F (36.4 °C)  Pulse:  [62-75] 62  Resp:  [14-46] 16  SpO2:  [96 %-100 %] 97 %  BP: (136-193)/(56-75) 136/61     Weight: 69.2 kg (152 lb 8.9 oz)  Body mass index is 24.62 kg/m².    Physical Exam  Vitals and nursing note reviewed.   HENT:      Head: Normocephalic and atraumatic.      Nose: Nose normal.   Eyes:      Conjunctiva/sclera: Conjunctivae normal.   Neck:      Vascular: No JVD.   Cardiovascular:      Rate and Rhythm: Normal rate.      Heart sounds: Normal heart sounds.   Pulmonary:      Effort: Pulmonary effort is normal.      Breath sounds: Normal breath sounds.   Abdominal:      General: Bowel sounds are normal.      Palpations: Abdomen is soft.   Skin:     General: Skin is warm.   Neurological:      Mental Status: She is alert and oriented to person, place, and time.   Psychiatric:         Mood and Affect: Mood normal.           Significant Labs: All pertinent labs within the past 24 hours have been reviewed.  CBC:   Recent Labs   Lab 10/06/22  1013 10/07/22  0623   WBC 10.7 9.34   HGB 12.4 12.0   HCT 37.5 36.5*    235     CMP:   Recent Labs   Lab 10/06/22  1013 10/07/22  0623    137   K 4.0 3.5    108   CO2 19* 22*   * 134*   BUN 21 16   CREATININE 1.32* 1.1   CALCIUM 9.4 9.3   PROT  --  7.2   ALBUMIN  --  4.0   BILITOT  --  1.2*   ALKPHOS  --  117   AST  --  17   ALT  --  14   ANIONGAP  --  7*     Cardiac Markers: No results for input(s): CKMB, MYOGLOBIN, BNP, TROPISTAT in the last 48 hours.    Significant Imaging: I have reviewed all pertinent imaging results/findings within the past 24 hours.

## 2022-10-07 NOTE — ED PROVIDER NOTES
Encounter Date: 10/7/2022       History     Chief Complaint   Patient presents with    Abdominal Pain     Pt recently dx with diverticulitis and states she has been vomiting from the antibiotics she is taking    Constipation     77-year-old female presented emergency department with gradually worsening abdominal pain.  Patient was seen by primary care provider 2 days ago and was started on ciprofloxacin and Flagyl for diverticulitis empirically.  Patient has diffuse abdominal pain and since she started medication she is not improving and states pain actually is getting worse and having nausea and vomiting associated with pain and could not keep antibiotics down so presented here.  Patient had history of diverticulitis in the past and this feels similar.  Patient rates pain 8/10 and diffusely in the lower abdomen.    Review of patient's allergies indicates:   Allergen Reactions    Codeine     Sulfa (sulfonamide antibiotics)     Sulfur Other (See Comments)     Past Medical History:   Diagnosis Date    Glaucoma      Past Surgical History:   Procedure Laterality Date    CATARACT EXTRACTION  2016    HYSTERECTOMY       Family History   Problem Relation Age of Onset    Cancer Mother      Social History     Tobacco Use    Smoking status: Never    Smokeless tobacco: Never   Substance Use Topics    Alcohol use: No    Drug use: No     Review of Systems   Constitutional:  Positive for fatigue.   HENT: Negative.     Eyes: Negative.    Respiratory: Negative.     Cardiovascular: Negative.  Negative for chest pain.   Gastrointestinal:  Positive for abdominal pain, nausea and vomiting.   Endocrine: Negative.    Genitourinary: Negative.    Musculoskeletal: Negative.    Skin: Negative.    Allergic/Immunologic: Negative.    Neurological: Negative.    Hematological: Negative.    Psychiatric/Behavioral: Negative.     All other systems reviewed and are negative.    Physical Exam     Initial Vitals [10/07/22 0541]   BP Pulse Resp Temp SpO2    (!) 187/74 74 18 98 °F (36.7 °C) 98 %      MAP       --         Physical Exam    Nursing note and vitals reviewed.  Constitutional: She appears well-developed and well-nourished.   HENT:   Head: Normocephalic and atraumatic.   Nose: Nose normal.   Mouth/Throat: Oropharynx is clear and moist.   Eyes: Conjunctivae and EOM are normal. Pupils are equal, round, and reactive to light.   Neck: Neck supple. No thyromegaly present. No tracheal deviation present. No JVD present.   Normal range of motion.  Cardiovascular:  Normal rate, regular rhythm, normal heart sounds and intact distal pulses.           No murmur heard.  Pulmonary/Chest: Breath sounds normal. No stridor. No respiratory distress. She has no wheezes. She has no rales.   Abdominal: Abdomen is soft. Bowel sounds are normal. There is abdominal tenderness.   Diffuse moderate lower abdominal tenderness.  No rebound There is guarding. There is no rebound.   Musculoskeletal:         General: No edema. Normal range of motion.      Cervical back: Normal range of motion and neck supple.     Neurological: She is alert and oriented to person, place, and time. She has normal strength. GCS score is 15. GCS eye subscore is 4. GCS verbal subscore is 5. GCS motor subscore is 6.   Skin: Skin is warm. Capillary refill takes less than 2 seconds.   Psychiatric: She has a normal mood and affect. Thought content normal.       ED Course   Procedures  Labs Reviewed   CBC W/ AUTO DIFFERENTIAL - Abnormal; Notable for the following components:       Result Value    Hematocrit 36.5 (*)     Gran % 78.9 (*)     Lymph % 12.6 (*)     All other components within normal limits   COMPREHENSIVE METABOLIC PANEL - Abnormal; Notable for the following components:    CO2 22 (*)     Glucose 134 (*)     Total Bilirubin 1.2 (*)     Anion Gap 7 (*)     eGFR 51.1 (*)     All other components within normal limits   URINALYSIS, REFLEX TO URINE CULTURE - Abnormal; Notable for the following components:     Specific Gravity, UA >=1.030 (*)     Ketones, UA Trace (*)     Occult Blood UA 1+ (*)     Leukocytes, UA Trace (*)     All other components within normal limits    Narrative:     Specimen Source->Urine   URINALYSIS MICROSCOPIC - Abnormal; Notable for the following components:    RBC, UA 5 (*)     All other components within normal limits    Narrative:     Specimen Source->Urine   LIPASE   ISTAT CREATININE   POCT CREATININE          Imaging Results              CT Abdomen Pelvis With Contrast (Final result)  Result time 10/07/22 08:13:04      Final result by Michelle Mcdaniel IV, MD (10/07/22 08:13:04)                   Narrative:    All CT scans at this facility use dose modulation, degenerative reconstruction  and/or weight-based dosing when appropriate to reduce radiation dose to as low as reasonably achievable.    CT of the abdomen with contrast and CT of the pelvis with contrast    HISTORY: Left lower quadrant abdominal pain.    The study utilizes 100 cc of intravenous nonionic contrast material.    There is concentric wall thickening involving the mid sigmoid colon with surrounding pericolonic inflammatory changes. There is an inflamed diverticulum directed medially from the involved segment of colon. The findings are compatible with acute diverticulitis. No discrete fluid collections are identified. Remaining bowel loops appear unremarkable. There is no evidence of obstruction. There is a small sliding-type hiatal hernia.    The liver and spleen are normal in size. No focal parenchymal abnormalities are observed. The gallbladder is mildly distended. There is no wall thickening or biliary dilatation. No acute pancreatic or adrenal abnormality identified. The kidneys are normal in size and position. There is a cortical cyst in the lower pole of the right kidney with additional probable subcentimeter cysts on the left. There are no suspicious renal masses. There is no hydronephrosis.    Scattered atheromatous  changes are observed within the wall of the abdominal aorta and branches without aneurysmal dilatation. Although incompletely assessed, there appears to be significant luminal narrowing involving the proximal segment of the celiac axis, origin of the superior mesenteric artery and right renal artery.    No adenopathy is demonstrated. There is a trace amount of free pelvic fluid. There has been a previous hysterectomy. There are no adnexal masses.    The urinary bladder is incompletely distended but grossly unremarkable.    There is minor atelectasis or scarring in the lung bases. There are degenerative changes within the spine. Osteoarthritic changes are noted at the hips.    IMPRESSION:    Acute sigmoid diverticulitis.    Additional observations as above.    Electronically signed by:  Michelle Mcdaniel MD  10/7/2022 8:13 AM CDT Workstation: [id number]                                     Medications   pantoprazole injection 40 mg (40 mg Intravenous Given 10/7/22 0731)   ondansetron injection 8 mg (8 mg Intravenous Given 10/7/22 0718)   morphine injection 4 mg (4 mg Intravenous Given 10/7/22 0725)   piperacillin-tazobactam 4.5 g in dextrose 5 % 100 mL IVPB (ready to mix system) (4.5 g Intravenous New Bag 10/7/22 0733)   iohexoL (OMNIPAQUE 350) injection 100 mL (100 mLs Intravenous Given 10/7/22 0737)     Medical Decision Making:   Differential Diagnosis:   77-year-old female with diffuse lower abdominal pain and tenderness and was on antibiotics for 2 days however has worsening symptoms and worsening pain.  Patient not tolerating oral antibiotics well.  Given patient's symptoms and presentation CT scan done which showed evidence of acute sigmoid diverticulitis and given failure of outpatient treatment started on Zosyn and Hospital Medicine consulted for evaluation for admission and further management.  Screening labs and workup reviewed.  Patient hydrated.  Hospitalist evaluate patient for admission.  Pain  control.  Clinical Tests:   Lab Tests: Reviewed  Radiological Study: Reviewed                        Clinical Impression:   Final diagnoses:  [K57.92] Diverticulitis (Primary)  [Z78.9] Failure of outpatient treatment      ED Disposition Condition    Admit Stable                Rebecca Francois MD  10/07/22 0895

## 2022-10-07 NOTE — CONSULTS
[facility]  General Surgery  Consult Note    Patient Name: Payton Gutierrez  MRN: [id number]  Code Status: Full Code  Admission Date: 10/7/2022  Hospital Length of Stay: 0 days  Attending Physician: Jaren Arnold MD  Primary Care Provider: Max Landeros MD    Patient information was obtained from ER records.     Inpatient consult to General Surgery  Consult performed by: Alejo Johnson MD  Consult ordered by: Jaren Arnold MD  Reason for consult: diverticulitis  Assessment/Recommendations: Antibiotics  Gi rest        Subjective:     Principal Problem: Diverticulitis    History of Present Illness: No notes on file    No current facility-administered medications on file prior to encounter.     Current Outpatient Medications on File Prior to Encounter   Medication Sig    ergocalciferol, vitamin D2, (VITAMIN D ORAL) Take 1 tablet by mouth once daily.    FLUoxetine 10 MG capsule Take 1 capsule (10 mg total) by mouth once daily.    lisinopriL (PRINIVIL,ZESTRIL) 20 MG tablet Take 1 tablet (20 mg total) by mouth once daily.    lithium carbonate 150 MG capsule Take 1 capsule (150 mg total) by mouth once daily at 6am.    LUMIGAN 0.01 % Drop Place 1 drop into both eyes every evening.    omeprazole (PRILOSEC) 20 MG capsule Take 1 capsule (20 mg total) by mouth once daily.    rosuvastatin (CRESTOR) 10 MG tablet Take 1 tablet (10 mg total) by mouth once daily.    ciprofloxacin HCl (CIPRO) 500 MG tablet Take 1 tablet (500 mg total) by mouth every 12 (twelve) hours. for 10 days    metroNIDAZOLE (FLAGYL) 500 MG tablet Take 1 tablet (500 mg total) by mouth 3 (three) times daily. for 7 days       Review of patient's allergies indicates:   Allergen Reactions    Codeine     Sulfa (sulfonamide antibiotics)     Sulfur Other (See Comments)       Past Medical History:   Diagnosis Date    Glaucoma      Past Surgical History:   Procedure Laterality Date    CATARACT EXTRACTION  2016    HYSTERECTOMY       Family  History       Problem Relation (Age of Onset)    Cancer Mother          Tobacco Use    Smoking status: Never    Smokeless tobacco: Never   Substance and Sexual Activity    Alcohol use: No    Drug use: No    Sexual activity: Not on file     Review of Systems   Constitutional:  Negative for activity change and fever.   Respiratory:  Negative for shortness of breath and wheezing.    Cardiovascular:  Negative for chest pain and leg swelling.   Gastrointestinal:  Positive for abdominal pain. Negative for abdominal distention.   Genitourinary:  Negative for difficulty urinating.   Skin:  Negative for color change.   Neurological:  Negative for dizziness and facial asymmetry.   Psychiatric/Behavioral:  Negative for agitation and confusion.    Objective:     Vital Signs (Most Recent):  Temp: 97.5 °F (36.4 °C) (10/07/22 1233)  Pulse: 62 (10/07/22 1233)  Resp: 16 (10/07/22 1233)  BP: 136/61 (10/07/22 1233)  SpO2: 97 % (10/07/22 1233) Vital Signs (24h Range):  Temp:  [97.5 °F (36.4 °C)-98.2 °F (36.8 °C)] 97.5 °F (36.4 °C)  Pulse:  [62-75] 62  Resp:  [14-46] 16  SpO2:  [96 %-100 %] 97 %  BP: (136-193)/(56-75) 136/61     Weight: 69.2 kg (152 lb 8.9 oz)  Body mass index is 24.62 kg/m².    Physical Exam  Vitals and nursing note reviewed.   HENT:      Head: Normocephalic and atraumatic.      Nose: Nose normal.   Eyes:      Conjunctiva/sclera: Conjunctivae normal.   Neck:      Vascular: No JVD.   Cardiovascular:      Rate and Rhythm: Normal rate.      Heart sounds: Normal heart sounds.   Pulmonary:      Effort: Pulmonary effort is normal.      Breath sounds: Normal breath sounds.   Abdominal:      General: Bowel sounds are normal.      Palpations: Abdomen is soft.   Skin:     General: Skin is warm.   Neurological:      Mental Status: She is alert and oriented to person, place, and time.   Psychiatric:         Mood and Affect: Mood normal.       Significant Labs:  CBC:   Recent Labs   Lab 10/07/22  0623   WBC 9.34   RBC 4.04    HGB 12.0   HCT 36.5*      MCV 90   MCH 29.7   MCHC 32.9     CMP  Sodium   Date Value Ref Range Status   10/07/2022 137 136 - 145 mmol/L Final     Potassium   Date Value Ref Range Status   10/07/2022 3.5 3.5 - 5.1 mmol/L Final     Chloride   Date Value Ref Range Status   10/07/2022 108 95 - 110 mmol/L Final     CO2   Date Value Ref Range Status   10/07/2022 22 (L) 23 - 29 mmol/L Final     Glucose   Date Value Ref Range Status   10/07/2022 134 (H) 70 - 110 mg/dL Final     BUN   Date Value Ref Range Status   10/07/2022 16 8 - 23 mg/dL Final     Creatinine   Date Value Ref Range Status   10/07/2022 1.1 0.5 - 1.4 mg/dL Final     Calcium   Date Value Ref Range Status   10/07/2022 9.3 8.7 - 10.5 mg/dL Final     Total Protein   Date Value Ref Range Status   10/07/2022 7.2 6.0 - 8.4 g/dL Final     Albumin   Date Value Ref Range Status   10/07/2022 4.0 3.5 - 5.2 g/dL Final     Total Bilirubin   Date Value Ref Range Status   10/07/2022 1.2 (H) 0.1 - 1.0 mg/dL Final     Comment:     For infants and newborns, interpretation of results should be based  on gestational age, weight and in agreement with clinical  observations.    Premature Infant recommended reference ranges:  Up to 24 hours.............<8.0 mg/dL  Up to 48 hours............<12.0 mg/dL  3-5 days..................<15.0 mg/dL  6-29 days.................<15.0 mg/dL       Alkaline Phosphatase   Date Value Ref Range Status   10/07/2022 117 55 - 135 U/L Final     AST   Date Value Ref Range Status   10/07/2022 17 10 - 40 U/L Final     ALT   Date Value Ref Range Status   10/07/2022 14 10 - 44 U/L Final     Anion Gap   Date Value Ref Range Status   10/07/2022 7 (L) 8 - 16 mmol/L Final     eGFR if    Date Value Ref Range Status   06/23/2022 50.0 (A) >60 mL/min/1.73 m^2 Final     eGFR if non    Date Value Ref Range Status   06/23/2022 43.4 (A) >60 mL/min/1.73 m^2 Final     Comment:     Calculation used to obtain the estimated glomerular  filtration  rate (eGFR) is the CKD-EPI equation.            Significant Diagnostics:  I have reviewed all pertinent imaging results/findings within the past 24 hours.      CT reviewed- acute sigmoid diverticulitis, no abscess, no perforation, images independently reviewed    Assessment/Plan:     * Diverticulitis failed outpatient treatment   Pain improving already  Continue antibiotics iv for now    No obvious need for surgical management but will follow    Discussed with primary team      VTE Risk Mitigation (From admission, onward)         Ordered     IP VTE HIGH RISK PATIENT  Once         10/07/22 0857     Place sequential compression device  Until discontinued         10/07/22 0857                Thank you for your consult. I will follow-up with patient. Please contact us if you have any additional questions.    Alejo Johnson MD  General Surgery  [facility]

## 2022-10-08 VITALS
RESPIRATION RATE: 18 BRPM | WEIGHT: 152.56 LBS | OXYGEN SATURATION: 97 % | BODY MASS INDEX: 24.52 KG/M2 | SYSTOLIC BLOOD PRESSURE: 102 MMHG | HEART RATE: 76 BPM | TEMPERATURE: 98 F | DIASTOLIC BLOOD PRESSURE: 57 MMHG | HEIGHT: 66 IN

## 2022-10-08 LAB
ANION GAP SERPL CALC-SCNC: 8 MMOL/L (ref 8–16)
BASOPHILS # BLD AUTO: 0.04 K/UL (ref 0–0.2)
BASOPHILS NFR BLD: 0.7 % (ref 0–1.9)
BUN SERPL-MCNC: 14 MG/DL (ref 8–23)
CALCIUM SERPL-MCNC: 9.1 MG/DL (ref 8.7–10.5)
CHLORIDE SERPL-SCNC: 107 MMOL/L (ref 95–110)
CO2 SERPL-SCNC: 23 MMOL/L (ref 23–29)
CREAT SERPL-MCNC: 1.3 MG/DL (ref 0.5–1.4)
DIFFERENTIAL METHOD: ABNORMAL
EOSINOPHIL # BLD AUTO: 0.2 K/UL (ref 0–0.5)
EOSINOPHIL NFR BLD: 3.3 % (ref 0–8)
ERYTHROCYTE [DISTWIDTH] IN BLOOD BY AUTOMATED COUNT: 12.8 % (ref 11.5–14.5)
EST. GFR  (NO RACE VARIABLE): 41.8 ML/MIN/1.73 M^2
GLUCOSE SERPL-MCNC: 99 MG/DL (ref 70–110)
HCT VFR BLD AUTO: 34.9 % (ref 37–48.5)
HGB BLD-MCNC: 11.3 G/DL (ref 12–16)
IMM GRANULOCYTES # BLD AUTO: 0.02 K/UL (ref 0–0.04)
IMM GRANULOCYTES NFR BLD AUTO: 0.3 % (ref 0–0.5)
LYMPHOCYTES # BLD AUTO: 1.3 K/UL (ref 1–4.8)
LYMPHOCYTES NFR BLD: 21.4 % (ref 18–48)
MCH RBC QN AUTO: 30.1 PG (ref 27–31)
MCHC RBC AUTO-ENTMCNC: 32.4 G/DL (ref 32–36)
MCV RBC AUTO: 93 FL (ref 82–98)
MONOCYTES # BLD AUTO: 0.5 K/UL (ref 0.3–1)
MONOCYTES NFR BLD: 7.9 % (ref 4–15)
NEUTROPHILS # BLD AUTO: 3.9 K/UL (ref 1.8–7.7)
NEUTROPHILS NFR BLD: 66.4 % (ref 38–73)
NRBC BLD-RTO: 0 /100 WBC
PLATELET # BLD AUTO: 198 K/UL (ref 150–450)
PMV BLD AUTO: 10.4 FL (ref 9.2–12.9)
POTASSIUM SERPL-SCNC: 3.8 MMOL/L (ref 3.5–5.1)
RBC # BLD AUTO: 3.75 M/UL (ref 4–5.4)
SODIUM SERPL-SCNC: 138 MMOL/L (ref 136–145)
WBC # BLD AUTO: 5.84 K/UL (ref 3.9–12.7)

## 2022-10-08 PROCEDURE — 36415 COLL VENOUS BLD VENIPUNCTURE: CPT | Performed by: INTERNAL MEDICINE

## 2022-10-08 PROCEDURE — 25000003 PHARM REV CODE 250: Performed by: INTERNAL MEDICINE

## 2022-10-08 PROCEDURE — 80048 BASIC METABOLIC PNL TOTAL CA: CPT | Performed by: INTERNAL MEDICINE

## 2022-10-08 PROCEDURE — 85025 COMPLETE CBC W/AUTO DIFF WBC: CPT | Performed by: INTERNAL MEDICINE

## 2022-10-08 PROCEDURE — 63600175 PHARM REV CODE 636 W HCPCS: Performed by: INTERNAL MEDICINE

## 2022-10-08 RX ORDER — CEFUROXIME AXETIL 500 MG/1
500 TABLET ORAL EVERY 12 HOURS
Qty: 14 TABLET | Refills: 0 | Status: SHIPPED | OUTPATIENT
Start: 2022-10-08 | End: 2023-06-23

## 2022-10-08 RX ORDER — METRONIDAZOLE 500 MG/1
500 TABLET ORAL 3 TIMES DAILY
Qty: 21 TABLET | Refills: 0 | Status: SHIPPED | OUTPATIENT
Start: 2022-10-08 | End: 2023-06-23

## 2022-10-08 RX ADMIN — FLUOXETINE 10 MG: 10 CAPSULE ORAL at 08:10

## 2022-10-08 RX ADMIN — PIPERACILLIN SODIUM AND TAZOBACTAM SODIUM 3.38 G: 3; .375 INJECTION, POWDER, LYOPHILIZED, FOR SOLUTION INTRAVENOUS at 08:10

## 2022-10-08 RX ADMIN — LISINOPRIL 20 MG: 20 TABLET ORAL at 08:10

## 2022-10-08 RX ADMIN — LITHIUM CARBONATE 150 MG: 150 CAPSULE ORAL at 08:10

## 2022-10-08 NOTE — DISCHARGE SUMMARY
[facility] Medicine  Discharge Summary      Patient Name: Payton Gutierrez  MRN: [id number]  Patient Class: IP- Inpatient  Admission Date: 10/7/2022  Hospital Length of Stay: 1 days  Discharge Date and Time:  10/08/2022 3:20 PM  Attending Physician: No att. providers found   Discharging Provider: Jaren Arnold MD  Primary Care Provider: Max Landeros MD      HPI:   77-year-old female with previous history of diverticulitis 4 times came to the emergency room with non resolving abdominal pain.  She was being treated by a provider 2 days ago with Cipro and Flagyl.  But she still having abdominal pain not improving not tolerating the diet and associated with nausea and vomiting and came to the hospital.  She never had diarrhea previous 3 days and no blood in the stool and no fever reported.  Lab workup with no leukocytosis and afebrile.  CT scan of the abdomen was done with acute sigmoid diverticulitis with no signs of perforations and admitted.  On  examination, patient is afebrile and minimal tenderness in the abdomen and soft.          * No surgery found *      Hospital Course:      77-year-old female with previous history of diverticulitis 4 times came to the emergency room with non resolving abdominal pain.  She was being treated by a provider 2 days ago with Cipro and Flagyl.  But she still having abdominal pain and  not improving not tolerating the diet and associated with nausea and vomiting and came to the hospital.    She was started on IV Zosyn and consulted  surgeon because of she had repeated episodes of diverticulitis.  She is not keen on surgical approach and also not recommended by surgeon at this time.  Later she was discharged with Cipro and also continued the Flagyl for 1 week and advised to eat full liquid diet for about a week and discharged stable condition..She is totally symptom free at discharge .         Goals of Care Treatment Preferences:  Code Status: Full  Code      Consults:   Consults (From admission, onward)        Status Ordering Provider     Inpatient consult to General Surgery  Once        Provider:  Alejo Johnson MD    Completed [facility] ALAN.          No new Assessment & Plan notes have been filed under this hospital service since the last note was generated.  Service: Hospital Medicine    Final Active Diagnoses:    Diagnosis Date Noted POA    PRINCIPAL PROBLEM:  Diverticulitis failed outpatient treatment  [K57.92] 10/07/2022 Yes    Bipolar disorder, in full remission, most recent episode depressed [F31.76] 11/03/2020 Yes    Gastroesophageal reflux disease without esophagitis [K21.9] 04/23/2020 Yes    CKD (chronic kidney disease) stage 3, GFR 30-59 ml/min [N18.30] 04/23/2020 Yes    Parkinson disease [G20] 09/11/2017 Yes    Essential hypertension [I10] 09/11/2017 Yes    High cholesterol [E78.00] 09/11/2017 Yes      Problems Resolved During this Admission:       Discharged Condition: good    Disposition: Home or Self Care    Follow Up:   Follow-up Information     Alejo Johnson MD Follow up in 1 month(s).    Specialties: General Surgery, Bariatrics, Surgery  Contact information:  [address]  [place] [address]  [phone]                       Patient Instructions:      Diet Dysphagia Mechanical Soft     Activity as tolerated       Significant Diagnostic Studies: Labs:   CMP   Recent Labs   Lab 10/07/22  0623 10/08/22  0550    138   K 3.5 3.8    107   CO2 22* 23   * 99   BUN 16 14   CREATININE 1.1 1.3   CALCIUM 9.3 9.1   PROT 7.2  --    ALBUMIN 4.0  --    BILITOT 1.2*  --    ALKPHOS 117  --    AST 17  --    ALT 14  --    ANIONGAP 7* 8    and CBC   Recent Labs   Lab 10/07/22  0623 10/08/22  0550   WBC 9.34 5.84   HGB 12.0 11.3*   HCT 36.5* 34.9*    198       Pending Diagnostic Studies:     None         Medications:  Reconciled Home Medications:      Medication List      START taking these medications     cefUROXime 500 MG tablet  Commonly known as: CEFTIN  Take 1 tablet (500 mg total) by mouth every 12 (twelve) hours.        CONTINUE taking these medications    FLUoxetine 10 MG capsule  Take 1 capsule (10 mg total) by mouth once daily.     lisinopriL 20 MG tablet  Commonly known as: PRINIVIL,ZESTRIL  Take 1 tablet (20 mg total) by mouth once daily.     lithium carbonate 150 MG capsule  Take 1 capsule (150 mg total) by mouth once daily at 6am.     LUMIGAN 0.01 % Drop  Generic drug: bimatoprost  Place 1 drop into both eyes every evening.     metroNIDAZOLE 500 MG tablet  Commonly known as: FLAGYL  Take 1 tablet (500 mg total) by mouth 3 (three) times daily.     omeprazole 20 MG capsule  Commonly known as: PRILOSEC  Take 1 capsule (20 mg total) by mouth once daily.     rosuvastatin 10 MG tablet  Commonly known as: CRESTOR  Take 1 tablet (10 mg total) by mouth once daily.     VITAMIN D ORAL  Take 1 tablet by mouth once daily.        STOP taking these medications    ciprofloxacin HCl 500 MG tablet  Commonly known as: CIPRO            Indwelling Lines/Drains at time of discharge:   Lines/Drains/Airways     None             General: Patient resting comfortably in no acute distress. Appears as stated age. Calm  Eyes: EOM intact. No conjunctivae injection. No scleral icterus.  ENT: Hearing grossly intact. No discharge from ears. No nasal discharge.   CVS: RRR. No LE edema BL.  Lungs:  No accessory muscle use. No acute respiratory distress  Neuro: non focal , Follows commands. Responds appropriately  Abdomen is soft and non tender     Time spent on the discharge of patient: 22 minutes         Jaren Arnold MD  Department of Hospital Medicine  [facility]

## 2022-10-08 NOTE — HOSPITAL COURSE
77-year-old female with previous history of diverticulitis 4 times came to the emergency room with non resolving abdominal pain.  She was being treated by a provider 2 days ago with Cipro and Flagyl.  But she still having abdominal pain and  not improving not tolerating the diet and associated with nausea and vomiting and came to the hospital.    She was started on IV Zosyn and consulted  surgeon because of she had repeated episodes of diverticulitis.  She is not keen on surgical approach and also not recommended by surgeon at this time.  Later she was discharged with Cipro and also continued the Flagyl for 1 week and advised to eat full liquid diet for about a week and discharged stable condition..

## 2022-10-08 NOTE — PLAN OF CARE
10/08/22 1259   Final Note   Assessment Type Final Discharge Note   Anticipated Discharge Disposition Home   What phone number can be called within the next 1-3 days to see how you are doing after discharge? [phone]   Post-Acute Status   Discharge Delays None known at this time      [facility]   Physical Therapy Treatment Note     Name: Yulia Peralta  Clinic Number: [id number]    Therapy Diagnosis:    Gait instability      Left foot pain        Physician: Alfie Simeon MD*    Visit Date: 4/5/2022       Physician Orders: PT Eval and Treat   Medical Diagnosis from Referral: R26.81 (ICD-10-CM) - Gait instability  Evaluation Date: 3/15/2022  Authorization Period Expiration: 2/10/2023  Plan of Care Expiration: 6/13/2022  Progress Note Due: 4/15/2022  Visit # / Visits authorized: 2/ 1   FOTO: 1/3     Precautions: Standard     PTA Visit #: 0/5     Time In: 1347  Time Out: 1435  Total Billable Time: 47 minutes      SUBJECTIVE     Pt reports: her pain is most intense when she first gets up from sitting.   She was compliant with home exercise program.  Response to previous treatment: about the same  Functional change: None to date    Pain: 3/10  Location: left foot      OBJECTIVE     Objective Measures updated at progress report unless specified.     Treatment     Yulia received the treatments listed below:      therapeutic exercises to develop ROM and flexibility for 30 minutes including:  Ankle pumps x 20.   Ankle Inv/Evr x 20  +Standing Achilles stretch 20 sec hold x 5.   +Standing Plantar Fascia stretch 20 sec hold x 5.  Toe Curls w towel 3 x 10  Ankle 4 way RTB 2x10 ea   Patient education    manual therapy techniques: Joint mobilizations and Soft tissue Mobilization were applied to the: L foot/ankle for 15 minutes, including:  Sub-talor mobs for inversion - eversion and STM of plantar fascia.      Patient Education and Home Exercises     Home Exercises Provided and Patient Education Provided     Education provided:   - Reviewed need for proper arch supports and foot wear.  - shoe specialty store to inquire about supportive footwear    Written Home Exercises Provided: yes. Exercises were reviewed and Yulia was able to demonstrate them prior to the end of the  session.  Yulia demonstrated good  understanding of the education provided. See EMR under Patient Instructions for exercises provided during therapy sessions    ASSESSMENT     Noted limitation in inversion and eversion, good tolerance of STJ mobs. Continued to educate on importance of adherence to HEP. Pt will benefit from continued mobs to restore ankle eversion/ inversion, and global ankle TherEx for strength/stabilization.     Yulia Is progressing well towards her goals.   Pt prognosis is Good.     Pt will continue to benefit from skilled outpatient physical therapy to address the deficits listed in the problem list box on initial evaluation, provide pt/family education and to maximize pt's level of independence in the home and community environment.     Pt's spiritual, cultural and educational needs considered and pt agreeable to plan of care and goals.     Anticipated barriers to physical therapy: None    Goals:    GOALS: Short Term Goals:  6 weeks in progress  1. Report decreased L ankle/foot pain  <  / =  5/10 at worst to increase tolerance for gait  2. Pt will perform 8 STS in 30 seconds without UEs and without offloading L LE to demonstrate improved functional strength for transfers  3. Pt will demonstrate 5 degree improvement in L ankle eversion to improve gait  4. Pt will exhibit 1/2 grade improvement in L ankle global strength  5. Pt to tolerate HEP to improve ROM and independence with ADL's.     Long Term Goals: 12 weeks in progress  1. Pt will perform 8 STS in 30 seconds without UEs and without offloading L LE to demonstrate improved functional strength for transfers  2. Pt will demonstrate 5 degree improvement in L ankle eversion to improve gait  3. Pt will exhibit 1/2 grade improvement in L ankle global strength  4. Pt will perform TUG without AD in <12 to demonstrate decreased fall risk   5. Pt to be Independent with HEP to improve ROM and independence with ADL's.       PLAN     Con't with  progressive ROM and strengthening therex per POC.    Karen Sherman, PT

## 2022-10-10 ENCOUNTER — TELEPHONE (OUTPATIENT)
Dept: FAMILY MEDICINE | Facility: CLINIC | Age: 77
End: 2022-10-10

## 2022-10-10 NOTE — TELEPHONE ENCOUNTER
----- Message from JORI Muir sent at 10/10/2022  1:19 PM CDT -----  Please contact patient and inform her that her lab was reviewed.  Kidney function reveals a mild dehydration.  WBC count is within reference range, though is the higher limit of normal.  Leading diagnosis of diverticulitis is confirmed based on recent CT Abd findings. Her recent ER visit (10/7/22) was reviewed. Complete antibiotic regimen as prescribed.

## 2022-10-10 NOTE — TELEPHONE ENCOUNTER
Patient was a little rude when calling to give her lab results. Patient states she cancel her appointment with out office and had been in the hospital for several days. Informed patient I was calling in reference to her labs she had done prior to the ER visit that nobody went over with her. Informed patient of the results and that Amarjit review her ER notes. Patient stated if Amarjit was able to review the notes he would have seen she was given med from the hospital and is now under the care of those physicians.

## 2022-10-19 ENCOUNTER — HOSPITAL ENCOUNTER (EMERGENCY)
Facility: HOSPITAL | Age: 77
Discharge: HOME | End: 2022-10-19
Attending: EMERGENCY MEDICINE
Payer: COMMERCIAL

## 2022-10-19 VITALS
SYSTOLIC BLOOD PRESSURE: 163 MMHG | RESPIRATION RATE: 19 BRPM | HEART RATE: 69 BPM | OXYGEN SATURATION: 97 % | TEMPERATURE: 98 F | DIASTOLIC BLOOD PRESSURE: 71 MMHG

## 2022-10-19 DIAGNOSIS — M16.12 OSTEOARTHRITIS OF LEFT HIP, UNSPECIFIED OSTEOARTHRITIS TYPE: Primary | ICD-10-CM

## 2022-10-19 PROCEDURE — 99284 EMERGENCY DEPT VISIT MOD MDM: CPT | Mod: 25

## 2022-10-19 RX ORDER — DICLOFENAC SODIUM 10 MG/G
2 GEL TOPICAL DAILY
Qty: 450 G | Refills: 0 | Status: SHIPPED | OUTPATIENT
Start: 2022-10-19 | End: 2022-10-29

## 2022-10-19 RX ORDER — MELOXICAM 7.5 MG/1
7.5 TABLET ORAL DAILY
Qty: 10 TABLET | Refills: 0 | Status: SHIPPED | OUTPATIENT
Start: 2022-10-19 | End: 2022-10-29

## 2022-10-19 RX ORDER — MELOXICAM 7.5 MG/1
7.5 TABLET ORAL DAILY
Status: DISCONTINUED | OUTPATIENT
Start: 2022-10-19 | End: 2022-10-19 | Stop reason: HOSPADM

## 2022-10-19 NOTE — ED PROVIDER NOTES
Encounter Date: 10/19/2022       History     Chief Complaint   Patient presents with    Back Pain     X 3 DAYS    Hip Pain     LEFT, PAIN WHEN WALKING     77 year old male with history of sciatica and parkinson's disease presents to the ER with complaints of left buttock/left hip pain that began Monday AM. She notices pain is worst in the morning after waking up and standing up. Throughout the day the pain improves slightly. Certain movements exacerbate her symptoms. No recent fall or injury. No rash. No fever. Able to stand and bear weight but standing does exacerbate symptoms.     Review of patient's allergies indicates:   Allergen Reactions    Codeine     Sulfa (sulfonamide antibiotics)     Sulfur Other (See Comments)     Past Medical History:   Diagnosis Date    Glaucoma      Past Surgical History:   Procedure Laterality Date    CATARACT EXTRACTION  2016    HYSTERECTOMY       Family History   Problem Relation Age of Onset    Cancer Mother      Social History     Tobacco Use    Smoking status: Never    Smokeless tobacco: Never   Substance Use Topics    Alcohol use: No    Drug use: No     Review of Systems   Constitutional:  Negative for chills, diaphoresis, fatigue and fever.   HENT:  Negative for congestion, postnasal drip, rhinorrhea, sinus pressure, sinus pain, sneezing and sore throat.    Eyes:  Negative for photophobia and visual disturbance.   Respiratory:  Negative for cough, choking, chest tightness, shortness of breath and wheezing.    Cardiovascular:  Negative for chest pain, palpitations and leg swelling.   Gastrointestinal:  Negative for abdominal pain, constipation, diarrhea, nausea and vomiting.   Endocrine: Negative for polydipsia, polyphagia and polyuria.   Genitourinary:  Negative for decreased urine volume, difficulty urinating, dysuria, flank pain and urgency.   Musculoskeletal:  Positive for arthralgias, back pain and myalgias. Negative for neck pain and neck stiffness.   Skin:  Negative for  color change, rash and wound.   Allergic/Immunologic: Negative for immunocompromised state.   Neurological:  Negative for dizziness, syncope, weakness, numbness and headaches.   Hematological:  Does not bruise/bleed easily.   All other systems reviewed and are negative.    Physical Exam     Initial Vitals [10/19/22 0815]   BP Pulse Resp Temp SpO2   (!) 209/77 82 17 98.7 °F (37.1 °C) 97 %      MAP       --         Physical Exam    Nursing note and vitals reviewed.  Constitutional: She appears well-developed and well-nourished.   HENT:   Head: Normocephalic and atraumatic.   Eyes: Conjunctivae are normal.   Neck:   Normal range of motion.  Cardiovascular:  Normal rate.           Pulmonary/Chest: Breath sounds normal.   Abdominal: Abdomen is soft. Bowel sounds are normal.   Musculoskeletal:         General: Tenderness present. No edema. Normal range of motion.      Cervical back: Normal and normal range of motion.      Thoracic back: Normal.      Lumbar back: Normal. No spasms or tenderness. Normal range of motion. Negative right straight leg raise test and negative left straight leg raise test.      Left hip: Tenderness and bony tenderness present. No crepitus. Normal strength.      Left upper leg: Normal.      Right knee: Normal.      Left knee: Normal.     Neurological: She is alert and oriented to person, place, and time. She has normal strength. GCS score is 15. GCS eye subscore is 4. GCS verbal subscore is 5. GCS motor subscore is 6.   Skin: Skin is warm and dry. Capillary refill takes less than 2 seconds.   Psychiatric: She has a normal mood and affect. Thought content normal.       ED Course   Procedures  Labs Reviewed - No data to display       Imaging Results              CT Pelvis Without Contrast (Final result)  Result time 10/19/22 10:02:29      Final result by Vincent Umanzor MD (10/19/22 10:02:29)                   Narrative:    CMS MANDATED QUALITY DATA - CT RADIATION - 436    All CT scans at this  facility utilize dose modulation, iterative reconstruction, and/or weight based dosing when appropriate to reduce radiation dose to as low as reasonably achievable.        Reason: Pelvic pain, chronic, post-menopausal Back Pain (X 3 DAYS); Hip Pain (LEFT, PAIN WHEN WALKING)    TECHNIQUE: Pelvis CT without IV contrast.    Comparison: CT 10/7/2022    Findings:  Moderate degenerative spondylosis occurs at L4-L5 and L5-S1 with severe left and moderate right L5-S1 facet joint osteoarthrosis and mild to moderate bilateral L4-L5 facet joint osteoarthrosis.    Sacroiliac joints are maintained. Mild degenerative changes affect the pubic symphysis.    Severe left and moderate right hip osteoarthrosis is present. Bone-on-bone appearance of posterior left femoral head and left acetabulum is evident with subcortical geodes prominent in superior aspect of right femoral head. Marginal osteophytes formation occur about bilateral hips.    No acute fracture, dislocation, or destructive osseous lesion.    No large hip joint effusion. Colonic diverticula are present. Uterus is been removed. Bladder is normal.    Impression:    1. Severe left hip osteoarthrosis.  2. Moderate right hip osteoarthrosis.  3. Lower lumbar spine degenerative disc disease and facet joint osteoarthrosis.    Electronically signed by:  Vincent Umanzor MD  10/19/2022 10:02 AM CDT Workstation: [id number]                                     Medications   meloxicam tablet 7.5 mg (has no administration in time range)     Medical Decision Making:   Clinical Tests:   Lab Tests: Ordered  Radiological Study: Ordered and Reviewed  CT of the pelvis reveals severe left hip osteoarthrosis moderate rub osteoarthrosis also notable was lower lumbar spine degenerative disc disease and facet joint osteoarthrosis.  Her exam findings are in line with and osteoarthrosis/arthritis presentation as well.  She has normal range of motion.  No midline vertebral point tenderness on exam.  She  is able to ambulate but with pain.  While in the ER a 7.5 mg Mobic administered.  Will send home with a short course of Mobic and Voltaren gel.  She needs follow-up with an orthopedist for re-evaluation and possible could benefit from intra-articular joint injection for relief of pain if symptoms continue as long-term NSAIDs are likely not going to be beneficial for her due to her age and renal fx.  Also alternative remedies discussed.  She will also need to follow up with her PCP for elevated blood pressure.  ER return precautions discussed in detail she understands she should return for any new worsening symptoms.                        Clinical Impression:   Final diagnoses:  [M16.12] Osteoarthritis of left hip, unspecified osteoarthritis type (Primary)      ED Disposition Condition    Discharge Stable          ED Prescriptions       Medication Sig Dispense Start Date End Date Auth. Provider    meloxicam (MOBIC) 7.5 MG tablet Take 1 tablet (7.5 mg total) by mouth once daily. for 10 days 10 tablet 10/19/2022 10/29/2022 Ruby Vance NP    diclofenac sodium (VOLTAREN) 1 % Gel Apply 2 g topically once daily. for 10 days 450 g 10/19/2022 10/29/2022 Ruby Vance NP          Follow-up Information       Follow up With Specialties Details Why Contact Info    Max Landeros MD Family Medicine, Home Health Services, Hospice Services Schedule an appointment as soon as possible for a visit in 1 day  [address] [facility]  [address]  [facility]  [place] [id number]  [phone]      Delfino Sebastian MD Orthopedic Surgery Schedule an appointment as soon as possible for a visit in 2 days for ER visit follow up and re-evaluation, [address] [place] [address] Dr [place] [address]  [phone]               Ruby Vance NP  10/19/22 1049

## 2022-10-21 DIAGNOSIS — M25.559 HIP PAIN: Primary | ICD-10-CM

## 2022-11-16 ENCOUNTER — OFFICE VISIT (OUTPATIENT)
Dept: FAMILY MEDICINE | Facility: CLINIC | Age: 77
End: 2022-11-16
Payer: COMMERCIAL

## 2022-11-16 VITALS
WEIGHT: 153 LBS | SYSTOLIC BLOOD PRESSURE: 150 MMHG | DIASTOLIC BLOOD PRESSURE: 70 MMHG | HEART RATE: 69 BPM | HEIGHT: 66 IN | BODY MASS INDEX: 24.59 KG/M2

## 2022-11-16 DIAGNOSIS — M51.9 LUMBAR DISC DISEASE: ICD-10-CM

## 2022-11-16 DIAGNOSIS — H90.0 CONDUCTIVE HEARING LOSS, BILATERAL: ICD-10-CM

## 2022-11-16 DIAGNOSIS — K21.9 GASTROESOPHAGEAL REFLUX DISEASE WITHOUT ESOPHAGITIS: ICD-10-CM

## 2022-11-16 DIAGNOSIS — N18.31 STAGE 3A CHRONIC KIDNEY DISEASE: ICD-10-CM

## 2022-11-16 DIAGNOSIS — K57.92 DIVERTICULITIS: ICD-10-CM

## 2022-11-16 DIAGNOSIS — E78.00 HIGH CHOLESTEROL: ICD-10-CM

## 2022-11-16 DIAGNOSIS — Z78.0 MENOPAUSE: ICD-10-CM

## 2022-11-16 DIAGNOSIS — E83.118 OTHER HEMOCHROMATOSIS: ICD-10-CM

## 2022-11-16 DIAGNOSIS — I10 ESSENTIAL HYPERTENSION: ICD-10-CM

## 2022-11-16 DIAGNOSIS — N30.01 ACUTE CYSTITIS WITH HEMATURIA: Primary | ICD-10-CM

## 2022-11-16 DIAGNOSIS — R39.9 UTI SYMPTOMS: ICD-10-CM

## 2022-11-16 DIAGNOSIS — F31.76 BIPOLAR DISORDER, IN FULL REMISSION, MOST RECENT EPISODE DEPRESSED: ICD-10-CM

## 2022-11-16 LAB
BILIRUB UR QL STRIP: NEGATIVE
GLUCOSE UR QL STRIP: NEGATIVE
KETONES UR QL STRIP: NEGATIVE
LEUKOCYTE ESTERASE UR QL STRIP: POSITIVE
PH, POC UA: 5.5
POC BLOOD, URINE: POSITIVE
POC NITRATES, URINE: NEGATIVE
PROT UR QL STRIP: POSITIVE
SP GR UR STRIP: 1.03 (ref 1–1.03)
UROBILINOGEN UR STRIP-ACNC: NEGATIVE (ref 0.1–1.1)

## 2022-11-16 PROCEDURE — 3078F DIAST BP <80 MM HG: CPT | Mod: CPTII,S$GLB,, | Performed by: FAMILY MEDICINE

## 2022-11-16 PROCEDURE — 99214 PR OFFICE/OUTPT VISIT, EST, LEVL IV, 30-39 MIN: ICD-10-PCS | Mod: 25,S$GLB,, | Performed by: FAMILY MEDICINE

## 2022-11-16 PROCEDURE — 3077F PR MOST RECENT SYSTOLIC BLOOD PRESSURE >= 140 MM HG: ICD-10-PCS | Mod: CPTII,S$GLB,, | Performed by: FAMILY MEDICINE

## 2022-11-16 PROCEDURE — 1159F MED LIST DOCD IN RCRD: CPT | Mod: CPTII,S$GLB,, | Performed by: FAMILY MEDICINE

## 2022-11-16 PROCEDURE — 81003 POCT URINALYSIS, DIPSTICK, AUTOMATED, W/O SCOPE: ICD-10-PCS | Mod: QW,S$GLB,, | Performed by: FAMILY MEDICINE

## 2022-11-16 PROCEDURE — 3077F SYST BP >= 140 MM HG: CPT | Mod: CPTII,S$GLB,, | Performed by: FAMILY MEDICINE

## 2022-11-16 PROCEDURE — 90662 FLU VACCINE - QUADRIVALENT - HIGH DOSE (65+) PRESERVATIVE FREE IM: ICD-10-PCS | Mod: S$GLB,,, | Performed by: FAMILY MEDICINE

## 2022-11-16 PROCEDURE — 3078F PR MOST RECENT DIASTOLIC BLOOD PRESSURE < 80 MM HG: ICD-10-PCS | Mod: CPTII,S$GLB,, | Performed by: FAMILY MEDICINE

## 2022-11-16 PROCEDURE — 90662 IIV NO PRSV INCREASED AG IM: CPT | Mod: S$GLB,,, | Performed by: FAMILY MEDICINE

## 2022-11-16 PROCEDURE — 1159F PR MEDICATION LIST DOCUMENTED IN MEDICAL RECORD: ICD-10-PCS | Mod: CPTII,S$GLB,, | Performed by: FAMILY MEDICINE

## 2022-11-16 PROCEDURE — 81003 URINALYSIS AUTO W/O SCOPE: CPT | Mod: QW,S$GLB,, | Performed by: FAMILY MEDICINE

## 2022-11-16 PROCEDURE — 90471 FLU VACCINE - QUADRIVALENT - HIGH DOSE (65+) PRESERVATIVE FREE IM: ICD-10-PCS | Mod: S$GLB,,, | Performed by: FAMILY MEDICINE

## 2022-11-16 PROCEDURE — 90471 IMMUNIZATION ADMIN: CPT | Mod: S$GLB,,, | Performed by: FAMILY MEDICINE

## 2022-11-16 PROCEDURE — 99214 OFFICE O/P EST MOD 30 MIN: CPT | Mod: 25,S$GLB,, | Performed by: FAMILY MEDICINE

## 2022-11-16 RX ORDER — ROSUVASTATIN CALCIUM 10 MG/1
10 TABLET, COATED ORAL DAILY
Qty: 90 TABLET | Refills: 1 | Status: SHIPPED | OUTPATIENT
Start: 2022-11-16 | End: 2023-05-11 | Stop reason: SDUPTHER

## 2022-11-16 RX ORDER — NITROFURANTOIN 25; 75 MG/1; MG/1
100 CAPSULE ORAL 2 TIMES DAILY
Qty: 20 CAPSULE | Refills: 0 | Status: SHIPPED | OUTPATIENT
Start: 2022-11-16 | End: 2023-06-23 | Stop reason: SDUPTHER

## 2022-11-16 RX ORDER — LISINOPRIL 20 MG/1
20 TABLET ORAL DAILY
Qty: 90 TABLET | Refills: 1 | Status: SHIPPED | OUTPATIENT
Start: 2022-11-16 | End: 2023-05-11 | Stop reason: SDUPTHER

## 2022-11-16 RX ORDER — OMEPRAZOLE 20 MG/1
20 CAPSULE, DELAYED RELEASE ORAL DAILY
Qty: 90 CAPSULE | Refills: 1 | Status: SHIPPED | OUTPATIENT
Start: 2022-11-16 | End: 2023-05-11 | Stop reason: SDUPTHER

## 2022-11-16 RX ORDER — LITHIUM CARBONATE 150 MG/1
150 CAPSULE ORAL DAILY
Qty: 90 CAPSULE | Refills: 1 | Status: SHIPPED | OUTPATIENT
Start: 2022-11-16 | End: 2023-05-11 | Stop reason: SDUPTHER

## 2022-11-16 NOTE — PROGRESS NOTES
SUBJECTIVE:    Patient ID: Payton Gutierrez is a 77 y.o. female.    Chief Complaint: Urinary Tract Infection (Dip and UA ordered,brought bottles, discomfort to urinate since Friday , Flu vaccine ordered,abc ) and Medication Refill    [age] year old female with a hospital admission on 10/07/2022, she had diverticulitis and was treated with Flagyl Ceftin and eventually Augmentin.  Surgery was being considered but was not elected to be done for diverticulitis.  Dr. Jain recommended conservative treatment.  CT scan of the abdomen showed diverticulitis and degenerative L5-S1 lumbar discs.    She does have some frequent back pains.  Back exercises have been helpful.  She did not take meloxicam due to chronic kidney disease.  She takes 1 Advil Q 12 hours as needed.    History of hemochromatosis followed by Dr. Derick Nava.  No phlebotomies lately.    She is had dysuria for the last 4 days.  She would like urinalysis performed.    Bipolar disorder-her psychiatrist Dr. Lombardo is retiring soon.  She is been taken off of her Prozac and now takes lithium 150 mg daily her moods have been stable.  She would like for us to prescribe her lithium.    She is had 1 dose of the Shingrix vaccine and is willing to get a flu shot today.      Office Visit on 11/16/2022   Component Date Value Ref Range Status    POC Blood, Urine 11/16/2022 Positive (A)  Negative Final    POC Bilirubin, Urine 11/16/2022 Negative  Negative Final    POC Urobilinogen, Urine 11/16/2022 negative  0.1 - 1.1 Final    POC Ketones, Urine 11/16/2022 Negative  Negative Final    POC Protein, Urine 11/16/2022 Positive (A)  Negative Final    POC Nitrates, Urine 11/16/2022 Negative  Negative Final    POC Glucose, Urine 11/16/2022 Negative  Negative Final    pH, UA 11/16/2022 5.5   Final    POC Specific Gravity, Urine 11/16/2022 1.030 (A)  1.003 - 1.029 Final    POC Leukocytes, Urine 11/16/2022 Positive (A)  Negative Final    Urine Culture, Routine 11/16/2022   Will prescribe melatonin and follow-up after his trip   (A)   Preliminary   Admission on 10/07/2022, Discharged on 10/08/2022   Component Date Value Ref Range Status    WBC 10/07/2022 9.34  3.90 - 12.70 K/uL Final    RBC 10/07/2022 4.04  4.00 - 5.40 M/uL Final    Hemoglobin 10/07/2022 12.0  12.0 - 16.0 g/dL Final    Hematocrit 10/07/2022 36.5 (L)  37.0 - 48.5 % Final    MCV 10/07/2022 90  82 - 98 fL Final    MCH 10/07/2022 29.7  27.0 - 31.0 pg Final    MCHC 10/07/2022 32.9  32.0 - 36.0 g/dL Final    RDW 10/07/2022 12.8  11.5 - 14.5 % Final    Platelets 10/07/2022 235  150 - 450 K/uL Final    MPV 10/07/2022 10.1  9.2 - 12.9 fL Final    Immature Granulocytes 10/07/2022 0.3  0.0 - 0.5 % Final    Gran # (ANC) 10/07/2022 7.4  1.8 - 7.7 K/uL Final    Immature Grans (Abs) 10/07/2022 0.03  0.00 - 0.04 K/uL Final    Lymph # 10/07/2022 1.2  1.0 - 4.8 K/uL Final    Mono # 10/07/2022 0.7  0.3 - 1.0 K/uL Final    Eos # 10/07/2022 0.1  0.0 - 0.5 K/uL Final    Baso # 10/07/2022 0.05  0.00 - 0.20 K/uL Final    nRBC 10/07/2022 0  0 /100 WBC Final    Gran % 10/07/2022 78.9 (H)  38.0 - 73.0 % Final    Lymph % 10/07/2022 12.6 (L)  18.0 - 48.0 % Final    Mono % 10/07/2022 7.1  4.0 - 15.0 % Final    Eosinophil % 10/07/2022 0.6  0.0 - 8.0 % Final    Basophil % 10/07/2022 0.5  0.0 - 1.9 % Final    Differential Method 10/07/2022 Automated   Final    Sodium 10/07/2022 137  136 - 145 mmol/L Final    Potassium 10/07/2022 3.5  3.5 - 5.1 mmol/L Final    Chloride 10/07/2022 108  95 - 110 mmol/L Final    CO2 10/07/2022 22 (L)  23 - 29 mmol/L Final    Glucose 10/07/2022 134 (H)  70 - 110 mg/dL Final    BUN 10/07/2022 16  8 - 23 mg/dL Final    Creatinine 10/07/2022 1.1  0.5 - 1.4 mg/dL Final    Calcium 10/07/2022 9.3  8.7 - 10.5 mg/dL Final    Total Protein 10/07/2022 7.2  6.0 - 8.4 g/dL Final    Albumin 10/07/2022 4.0  3.5 - 5.2 g/dL Final    Total Bilirubin 10/07/2022 1.2 (H)  0.1 - 1.0 mg/dL Final    Alkaline Phosphatase 10/07/2022 117  55 - 135 U/L Final    AST 10/07/2022 17  10 - 40 U/L Final    ALT  10/07/2022 14  10 - 44 U/L Final    Anion Gap 10/07/2022 7 (L)  8 - 16 mmol/L Final    eGFR 10/07/2022 51.1 (A)  >60 mL/min/1.73 m^2 Final    Lipase 10/07/2022 30  4 - 60 U/L Final    Specimen UA 10/07/2022 Urine, Clean Catch   Final    Color, UA 10/07/2022 Yellow  Yellow, Straw, Otilia Final    Appearance, UA 10/07/2022 Clear  Clear Final    pH, UA 10/07/2022 5.0  5.0 - 8.0 Final    Specific Gravity, UA 10/07/2022 >=1.030 (A)  1.005 - 1.030 Final    Protein, UA 10/07/2022 Negative  Negative Final    Glucose, UA 10/07/2022 Negative  Negative Final    Ketones, UA 10/07/2022 Trace (A)  Negative Final    Bilirubin (UA) 10/07/2022 Negative  Negative Final    Occult Blood UA 10/07/2022 1+ (A)  Negative Final    Nitrite, UA 10/07/2022 Negative  Negative Final    Urobilinogen, UA 10/07/2022 Negative  Negative EU/dL Final    Leukocytes, UA 10/07/2022 Trace (A)  Negative Final    POC Creatinine 10/07/2022 1.2  0.5 - 1.4 mg/dL Final    Sample 10/07/2022 VENOUS   Final    RBC, UA 10/07/2022 5 (H)  0 - 4 /hpf Final    WBC, UA 10/07/2022 2  0 - 5 /hpf Final    Squam Epithel, UA 10/07/2022 0  /hpf Final    Hyaline Casts, UA 10/07/2022 0  0-1/lpf /lpf Final    Microscopic Comment 10/07/2022 SEE COMMENT   Final    Sodium 10/08/2022 138  136 - 145 mmol/L Final    Potassium 10/08/2022 3.8  3.5 - 5.1 mmol/L Final    Chloride 10/08/2022 107  95 - 110 mmol/L Final    CO2 10/08/2022 23  23 - 29 mmol/L Final    Glucose 10/08/2022 99  70 - 110 mg/dL Final    BUN 10/08/2022 14  8 - 23 mg/dL Final    Creatinine 10/08/2022 1.3  0.5 - 1.4 mg/dL Final    Calcium 10/08/2022 9.1  8.7 - 10.5 mg/dL Final    Anion Gap 10/08/2022 8  8 - 16 mmol/L Final    eGFR 10/08/2022 41.8 (A)  >60 mL/min/1.73 m^2 Final    WBC 10/08/2022 5.84  3.90 - 12.70 K/uL Final    RBC 10/08/2022 3.75 (L)  4.00 - 5.40 M/uL Final    Hemoglobin 10/08/2022 11.3 (L)  12.0 - 16.0 g/dL Final    Hematocrit 10/08/2022 34.9 (L)  37.0 - 48.5 % Final    MCV 10/08/2022 93  82 - 98 fL  Final    MCH 10/08/2022 30.1  27.0 - 31.0 pg Final    MCHC 10/08/2022 32.4  32.0 - 36.0 g/dL Final    RDW 10/08/2022 12.8  11.5 - 14.5 % Final    Platelets 10/08/2022 198  150 - 450 K/uL Final    MPV 10/08/2022 10.4  9.2 - 12.9 fL Final    Immature Granulocytes 10/08/2022 0.3  0.0 - 0.5 % Final    Gran # (ANC) 10/08/2022 3.9  1.8 - 7.7 K/uL Final    Immature Grans (Abs) 10/08/2022 0.02  0.00 - 0.04 K/uL Final    Lymph # 10/08/2022 1.3  1.0 - 4.8 K/uL Final    Mono # 10/08/2022 0.5  0.3 - 1.0 K/uL Final    Eos # 10/08/2022 0.2  0.0 - 0.5 K/uL Final    Baso # 10/08/2022 0.04  0.00 - 0.20 K/uL Final    nRBC 10/08/2022 0  0 /100 WBC Final    Gran % 10/08/2022 66.4  38.0 - 73.0 % Final    Lymph % 10/08/2022 21.4  18.0 - 48.0 % Final    Mono % 10/08/2022 7.9  4.0 - 15.0 % Final    Eosinophil % 10/08/2022 3.3  0.0 - 8.0 % Final    Basophil % 10/08/2022 0.7  0.0 - 1.9 % Final    Differential Method 10/08/2022 Automated   Final   Office Visit on 10/05/2022   Component Date Value Ref Range Status    Glucose 10/06/2022 104 (H)  65 - 99 mg/dL Final    BUN 10/06/2022 21  7 - 25 mg/dL Final    Creatinine 10/06/2022 1.32 (H)  0.60 - 1.00 mg/dL Final    eGFR 10/06/2022 41 (L)  > OR = 60 mL/min/1.73m2 Final    BUN/Creatinine Ratio 10/06/2022 16  6 - 22 (calc) Final    Sodium 10/06/2022 141  135 - 146 mmol/L Final    Potassium 10/06/2022 4.0  3.5 - 5.3 mmol/L Final    Chloride 10/06/2022 107  98 - 110 mmol/L Final    CO2 10/06/2022 19 (L)  20 - 32 mmol/L Final    Calcium 10/06/2022 9.4  8.6 - 10.4 mg/dL Final    WBC 10/06/2022 10.7  3.8 - 10.8 Thousand/uL Final    RBC 10/06/2022 4.13  3.80 - 5.10 Million/uL Final    Hemoglobin 10/06/2022 12.4  11.7 - 15.5 g/dL Final    Hematocrit 10/06/2022 37.5  35.0 - 45.0 % Final    MCV 10/06/2022 90.8  80.0 - 100.0 fL Final    MCH 10/06/2022 30.0  27.0 - 33.0 pg Final    MCHC 10/06/2022 33.1  32.0 - 36.0 g/dL Final    RDW 10/06/2022 12.6  11.0 - 15.0 % Final    Platelets 10/06/2022 230  140 - 400  Thousand/uL Final    MPV 10/06/2022 10.3  7.5 - 12.5 fL Final    Neutrophils, Abs 10/06/2022 8,902 (H)  1,500 - 7,800 cells/uL Final    Lymph # 10/06/2022 963  850 - 3,900 cells/uL Final    Mono # 10/06/2022 663  200 - 950 cells/uL Final    Eos # 10/06/2022 118  15 - 500 cells/uL Final    Baso # 10/06/2022 54  0 - 200 cells/uL Final    Neutrophils Relative 10/06/2022 83.2  % Final    Lymph % 10/06/2022 9.0  % Final    Mono % 10/06/2022 6.2  % Final    Eosinophil % 10/06/2022 1.1  % Final    Basophil % 10/06/2022 0.5  % Final   Lab Visit on 09/06/2022   Component Date Value Ref Range Status    Ferritin 09/06/2022 206  20.0 - 300.0 ng/mL Final    WBC 09/06/2022 5.30  3.90 - 12.70 K/uL Final    RBC 09/06/2022 4.52  4.00 - 5.40 M/uL Final    Hemoglobin 09/06/2022 13.6  12.0 - 16.0 g/dL Final    Hematocrit 09/06/2022 41.0  37.0 - 48.5 % Final    MCV 09/06/2022 91  82 - 98 fL Final    MCH 09/06/2022 30.1  27.0 - 31.0 pg Final    MCHC 09/06/2022 33.2  32.0 - 36.0 g/dL Final    RDW 09/06/2022 13.0  11.5 - 14.5 % Final    Platelets 09/06/2022 238  150 - 450 K/uL Final    MPV 09/06/2022 10.0  9.2 - 12.9 fL Final    Immature Granulocytes 09/06/2022 0.2  0.0 - 0.5 % Final    Gran # (ANC) 09/06/2022 3.2  1.8 - 7.7 K/uL Final    Immature Grans (Abs) 09/06/2022 0.01  0.00 - 0.04 K/uL Final    Lymph # 09/06/2022 1.4  1.0 - 4.8 K/uL Final    Mono # 09/06/2022 0.4  0.3 - 1.0 K/uL Final    Eos # 09/06/2022 0.2  0.0 - 0.5 K/uL Final    Baso # 09/06/2022 0.06  0.00 - 0.20 K/uL Final    nRBC 09/06/2022 0  0 /100 WBC Final    Gran % 09/06/2022 60.4  38.0 - 73.0 % Final    Lymph % 09/06/2022 26.8  18.0 - 48.0 % Final    Mono % 09/06/2022 7.7  4.0 - 15.0 % Final    Eosinophil % 09/06/2022 3.8  0.0 - 8.0 % Final    Basophil % 09/06/2022 1.1  0.0 - 1.9 % Final    Differential Method 09/06/2022 Automated   Final   Lab Visit on 06/23/2022   Component Date Value Ref Range Status    Vit D, 25-Hydroxy 06/23/2022 55  30 - 96 ng/mL Final    Glucose  06/23/2022 107  70 - 110 mg/dL Final    Sodium 06/23/2022 138  136 - 145 mmol/L Final    Potassium 06/23/2022 4.1  3.5 - 5.1 mmol/L Final    Chloride 06/23/2022 107  95 - 110 mmol/L Final    CO2 06/23/2022 23  23 - 29 mmol/L Final    BUN 06/23/2022 23  8 - 23 mg/dL Final    Calcium 06/23/2022 9.6  8.7 - 10.5 mg/dL Final    Creatinine 06/23/2022 1.2  0.5 - 1.4 mg/dL Final    Albumin 06/23/2022 4.3  3.5 - 5.2 g/dL Final    Phosphorus 06/23/2022 3.0  2.7 - 4.5 mg/dL Final    eGFR if African American 06/23/2022 50.0 (A)  >60 mL/min/1.73 m^2 Final    eGFR if non African American 06/23/2022 43.4 (A)  >60 mL/min/1.73 m^2 Final    Anion Gap 06/23/2022 8  8 - 16 mmol/L Final    Protein, Urine Random 06/23/2022 9  6 - 15 mg/dL Final    Creatinine, Urine 06/23/2022 103.0  15.0 - 325.0 mg/dL Final    Prot/Creat Ratio, Urine 06/23/2022 0.09  0.00 - 0.20 Final    RBC, UA 06/23/2022 1  0 - 4 /hpf Final    WBC, UA 06/23/2022 1  0 - 5 /hpf Final    Bacteria 06/23/2022 Negative  None-Occ /hpf Final    Squam Epithel, UA 06/23/2022 0  /hpf Final    Hyaline Casts, UA 06/23/2022 0  0-1/lpf /lpf Final    Microscopic Comment 06/23/2022 SEE COMMENT   Final       Past Medical History:   Diagnosis Date    Glaucoma      Social History     Socioeconomic History    Marital status: Single   Tobacco Use    Smoking status: Never    Smokeless tobacco: Never   Substance and Sexual Activity    Alcohol use: No    Drug use: No     Past Surgical History:   Procedure Laterality Date    CATARACT EXTRACTION  2016    HYSTERECTOMY       Family History   Problem Relation Age of Onset    Cancer Mother        Review of patient's allergies indicates:   Allergen Reactions    Codeine     Sulfa (sulfonamide antibiotics)     Sulfur Other (See Comments)       Current Outpatient Medications:     ergocalciferol, vitamin D2, (VITAMIN D ORAL), Take 1 tablet by mouth once daily., Disp: , Rfl:     LUMIGAN 0.01 % Drop, Place 1 drop into both eyes every evening., Disp: , Rfl:      cefUROXime (CEFTIN) 500 MG tablet, Take 1 tablet (500 mg total) by mouth every 12 (twelve) hours. (Patient not taking: Reported on 11/16/2022), Disp: 14 tablet, Rfl: 0    diclofenac sodium (VOLTAREN) 1 % Gel, Apply 2 g topically once daily. for 10 days, Disp: 450 g, Rfl: 0    FLUoxetine 10 MG capsule, Take 1 capsule (10 mg total) by mouth once daily. (Patient not taking: Reported on 11/16/2022), Disp: 90 capsule, Rfl: 1    lisinopriL (PRINIVIL,ZESTRIL) 20 MG tablet, Take 1 tablet (20 mg total) by mouth once daily., Disp: 90 tablet, Rfl: 1    lithium carbonate 150 MG capsule, Take 1 capsule (150 mg total) by mouth once daily., Disp: 90 capsule, Rfl: 1    metroNIDAZOLE (FLAGYL) 500 MG tablet, Take 1 tablet (500 mg total) by mouth 3 (three) times daily. (Patient not taking: Reported on 11/16/2022), Disp: 21 tablet, Rfl: 0    nitrofurantoin, macrocrystal-monohydrate, (MACROBID) 100 MG capsule, Take 1 capsule (100 mg total) by mouth 2 (two) times daily., Disp: 20 capsule, Rfl: 0    omeprazole (PRILOSEC) 20 MG capsule, Take 1 capsule (20 mg total) by mouth once daily., Disp: 90 capsule, Rfl: 1    rosuvastatin (CRESTOR) 10 MG tablet, Take 1 tablet (10 mg total) by mouth once daily., Disp: 90 tablet, Rfl: 1    Review of Systems   Constitutional:  Negative for appetite change, chills, fatigue, fever and unexpected weight change.   HENT:  Negative for congestion, ear pain, sinus pain, sore throat and trouble swallowing.    Eyes:  Negative for pain, discharge and visual disturbance.   Respiratory:  Negative for apnea, cough, shortness of breath and wheezing.    Cardiovascular:  Negative for chest pain, palpitations and leg swelling.   Gastrointestinal:  Positive for abdominal pain (acute diverticulitis). Negative for blood in stool, constipation, diarrhea, nausea and vomiting.   Endocrine: Negative for heat intolerance, polydipsia and polyuria.   Genitourinary:  Negative for difficulty urinating, dyspareunia, dysuria,  "frequency, hematuria and menstrual problem.   Musculoskeletal:  Positive for back pain. Negative for arthralgias, gait problem, joint swelling and myalgias.   Allergic/Immunologic: Negative for environmental allergies, food allergies and immunocompromised state.   Neurological:  Negative for dizziness, tremors, seizures, numbness and headaches.   Psychiatric/Behavioral:  Negative for behavioral problems, confusion, hallucinations and suicidal ideas. The patient is not nervous/anxious.         Objective:      Vitals:    11/16/22 0906 11/16/22 0910   BP: (!) 160/68 (!) 150/70   Pulse: 69    Weight: 69.4 kg (153 lb)    Height: 5' 6" (1.676 m)      Physical Exam  Vitals and nursing note reviewed.   Constitutional:       General: She is not in acute distress.     Appearance: Normal appearance. She is well-developed and normal weight. She is not toxic-appearing.   HENT:      Head: Normocephalic and atraumatic.      Right Ear: Tympanic membrane and external ear normal.      Left Ear: Tympanic membrane and external ear normal.      Nose: Nose normal.      Mouth/Throat:      Pharynx: Oropharynx is clear.   Eyes:      Pupils: Pupils are equal, round, and reactive to light.   Neck:      Thyroid: No thyromegaly.      Vascular: No carotid bruit.   Cardiovascular:      Rate and Rhythm: Normal rate and regular rhythm.      Heart sounds: Normal heart sounds. No murmur heard.  Pulmonary:      Effort: Pulmonary effort is normal.      Breath sounds: Normal breath sounds. No wheezing or rales.   Abdominal:      General: Bowel sounds are normal. There is no distension.      Palpations: Abdomen is soft. There is no mass.      Tenderness: There is no abdominal tenderness. There is no guarding or rebound.      Hernia: No hernia is present.   Musculoskeletal:         General: No tenderness or deformity. Normal range of motion.      Cervical back: Normal range of motion and neck supple.      Lumbar back: Normal. No spasms.      Comments: " Bends 60 degrees at  waist tight lumbar muscles are palpable, negative straight leg-raising test.  Shoulders and knees good range of motion without crepitance.  Gait appears normal.   Lymphadenopathy:      Cervical: No cervical adenopathy.   Skin:     General: Skin is warm and dry.      Findings: No rash.   Neurological:      Mental Status: She is alert and oriented to person, place, and time.      Cranial Nerves: No cranial nerve deficit.      Motor: No weakness.      Coordination: Coordination normal.      Gait: Gait normal.   Psychiatric:         Behavior: Behavior normal.         Thought Content: Thought content normal.         Judgment: Judgment normal.         Assessment:       1. Acute cystitis with hematuria    2. High cholesterol    3. Gastroesophageal reflux disease without esophagitis    4. Essential hypertension    5. UTI symptoms    6. Bipolar disorder, in full remission, most recent episode depressed    7. Conductive hearing loss, bilateral    8. Stage 3a chronic kidney disease    9. Menopause    10. Diverticulitis failed outpatient treatment     11. Other hemochromatosis    12. Lumbar disc disease           Plan:       Acute cystitis with hematuria  -     nitrofurantoin, macrocrystal-monohydrate, (MACROBID) 100 MG capsule; Take 1 capsule (100 mg total) by mouth 2 (two) times daily.  Dispense: 20 capsule; Refill: 0  Urinalysis has 1+ leukocytes 2+ blood.  Will treat with Macrobid 100 mg b.i.d.  High cholesterol  -     rosuvastatin (CRESTOR) 10 MG tablet; Take 1 tablet (10 mg total) by mouth once daily.  Dispense: 90 tablet; Refill: 1  Continue rosuvastatin  Gastroesophageal reflux disease without esophagitis  -     omeprazole (PRILOSEC) 20 MG capsule; Take 1 capsule (20 mg total) by mouth once daily.  Dispense: 90 capsule; Refill: 1    Essential hypertension  -     lisinopriL (PRINIVIL,ZESTRIL) 20 MG tablet; Take 1 tablet (20 mg total) by mouth once daily.  Dispense: 90 tablet; Refill: 1  Blood  pressure mildly elevated.  UTI symptoms  -     Urine culture; Future; Expected date: 11/16/2022  -     POCT Urinalysis, Dipstick, Automated, W/O Scope    Bipolar disorder, in full remission, most recent episode depressed  -     lithium carbonate 150 MG capsule; Take 1 capsule (150 mg total) by mouth once daily.  Dispense: 90 capsule; Refill: 1  Continue lithium 150 mg daily  Conductive hearing loss, bilateral    Stage 3a chronic kidney disease    Menopause    Diverticulitis failed outpatient treatment     Other hemochromatosis    Other orders  -     Influenza - Quadrivalent - High Dose (65+) (PF) (IM)  Lumbar disc disease-continue back exercises and Advil p.r.n.  Needs Shingrix vaccine dose 2.  Flu shot given today  Follow up in about 6 months (around 5/16/2023), or Diverticulitis.        11/19/2022 Max Landeros

## 2022-11-19 PROBLEM — M51.9 LUMBAR DISC DISEASE: Status: ACTIVE | Noted: 2022-11-19

## 2022-11-19 LAB — BACTERIA UR CULT: ABNORMAL

## 2022-11-21 ENCOUNTER — TELEPHONE (OUTPATIENT)
Dept: FAMILY MEDICINE | Facility: CLINIC | Age: 77
End: 2022-11-21

## 2022-11-21 NOTE — TELEPHONE ENCOUNTER
----- Message from Max Landeros MD sent at 11/20/2022  9:21 PM CST -----  Call patient.  Her urine culture is growing out Klebsiella germs.  We prescribed Macrobid 100 mg twice a day that should cure that

## 2022-11-21 NOTE — TELEPHONE ENCOUNTER
----- Message from [facility], RT sent at 11/21/2022 12:31 PM CST -----  Patient said we treated her for diverticulitis, she finished off Amoxil last Monday, but still with a little side pain when she has bowel movement. Wants to know if this is normal. Said she is ok once she has the bowel movement.

## 2022-11-21 NOTE — TELEPHONE ENCOUNTER
Spoke to patient with result verbatim per Dr Landeros. Verbalized understanding to finish off Macrobid.

## 2022-11-21 NOTE — TELEPHONE ENCOUNTER
"Spoke with pt and let her know per Dr. Landeros "yes, that is okay. Can increase stool softener or add miralax to help the passage of stools" Patient verbalized understanding.  "

## 2022-11-21 NOTE — PROGRESS NOTES
Call patient.  Her urine culture is growing out Klebsiella germs.  We prescribed Macrobid 100 mg twice a day that should cure that

## 2022-12-28 DIAGNOSIS — Z12.31 ENCOUNTER FOR SCREENING MAMMOGRAM FOR MALIGNANT NEOPLASM OF BREAST: Primary | ICD-10-CM

## 2023-01-12 ENCOUNTER — HOSPITAL ENCOUNTER (OUTPATIENT)
Dept: RADIOLOGY | Facility: HOSPITAL | Age: 78
Discharge: HOME | End: 2023-01-12
Attending: FAMILY MEDICINE
Payer: COMMERCIAL

## 2023-01-12 VITALS — BODY MASS INDEX: 24.59 KG/M2 | WEIGHT: 153 LBS | HEIGHT: 66 IN

## 2023-01-12 DIAGNOSIS — Z12.31 ENCOUNTER FOR SCREENING MAMMOGRAM FOR MALIGNANT NEOPLASM OF BREAST: ICD-10-CM

## 2023-01-12 PROCEDURE — 77067 SCR MAMMO BI INCL CAD: CPT | Mod: TC,PO

## 2023-01-17 ENCOUNTER — TELEPHONE (OUTPATIENT)
Dept: FAMILY MEDICINE | Facility: CLINIC | Age: 78
End: 2023-01-17

## 2023-01-17 NOTE — TELEPHONE ENCOUNTER
----- Message from Max Landeros MD sent at 1/15/2023  5:58 PM CST -----  Call patient.  Mammogram was normal.  Repeat mammogram in 1 year.

## 2023-01-24 ENCOUNTER — TELEPHONE (OUTPATIENT)
Dept: FAMILY MEDICINE | Facility: CLINIC | Age: 78
End: 2023-01-24

## 2023-01-24 DIAGNOSIS — Z13.89 SKIN CONDITION SCREENING: Primary | ICD-10-CM

## 2023-01-24 NOTE — TELEPHONE ENCOUNTER
----- Message from Ramirez George sent at 1/24/2023  9:23 AM CST -----      Name of Who is Calling:PT          What is the request in detail:PT is requesting a referral be put in the chart for dermatology as soon as possible, she has some lesions on her upper right chest, she has something on the bottom left cheek on her face.          Can the clinic reply by [facility]:no          What Number to Call Back if not in [phone]

## 2023-01-25 ENCOUNTER — TELEPHONE (OUTPATIENT)
Dept: FAMILY MEDICINE | Facility: CLINIC | Age: 78
End: 2023-01-25
Payer: COMMERCIAL

## 2023-01-25 NOTE — TELEPHONE ENCOUNTER
Derm referral: spoke with patient. 4/2023 first available but patient says she can't wait that long and will manage appointment herself.

## 2023-03-06 ENCOUNTER — TELEPHONE (OUTPATIENT)
Dept: HEMATOLOGY/ONCOLOGY | Facility: CLINIC | Age: 78
End: 2023-03-06

## 2023-03-07 ENCOUNTER — LAB VISIT (OUTPATIENT)
Dept: LAB | Facility: HOSPITAL | Age: 78
End: 2023-03-07
Attending: INTERNAL MEDICINE
Payer: COMMERCIAL

## 2023-03-07 DIAGNOSIS — E83.118 OTHER HEMOCHROMATOSIS: ICD-10-CM

## 2023-03-07 LAB
BASOPHILS # BLD AUTO: 0.07 K/UL (ref 0–0.2)
BASOPHILS NFR BLD: 0.9 % (ref 0–1.9)
DIFFERENTIAL METHOD: NORMAL
EOSINOPHIL # BLD AUTO: 0.3 K/UL (ref 0–0.5)
EOSINOPHIL NFR BLD: 3.8 % (ref 0–8)
ERYTHROCYTE [DISTWIDTH] IN BLOOD BY AUTOMATED COUNT: 13.6 % (ref 11.5–14.5)
FERRITIN SERPL-MCNC: 32 NG/ML (ref 20–300)
HCT VFR BLD AUTO: 37.8 % (ref 37–48.5)
HGB BLD-MCNC: 12.6 G/DL (ref 12–16)
IMM GRANULOCYTES # BLD AUTO: 0.01 K/UL (ref 0–0.04)
IMM GRANULOCYTES NFR BLD AUTO: 0.1 % (ref 0–0.5)
LYMPHOCYTES # BLD AUTO: 1.6 K/UL (ref 1–4.8)
LYMPHOCYTES NFR BLD: 20.5 % (ref 18–48)
MCH RBC QN AUTO: 29.9 PG (ref 27–31)
MCHC RBC AUTO-ENTMCNC: 33.3 G/DL (ref 32–36)
MCV RBC AUTO: 90 FL (ref 82–98)
MONOCYTES # BLD AUTO: 0.5 K/UL (ref 0.3–1)
MONOCYTES NFR BLD: 7 % (ref 4–15)
NEUTROPHILS # BLD AUTO: 5.2 K/UL (ref 1.8–7.7)
NEUTROPHILS NFR BLD: 67.7 % (ref 38–73)
NRBC BLD-RTO: 0 /100 WBC
PLATELET # BLD AUTO: 234 K/UL (ref 150–450)
PMV BLD AUTO: 10.2 FL (ref 9.2–12.9)
RBC # BLD AUTO: 4.22 M/UL (ref 4–5.4)
WBC # BLD AUTO: 7.7 K/UL (ref 3.9–12.7)

## 2023-03-07 PROCEDURE — 85025 COMPLETE CBC W/AUTO DIFF WBC: CPT | Performed by: INTERNAL MEDICINE

## 2023-03-07 PROCEDURE — 36415 COLL VENOUS BLD VENIPUNCTURE: CPT | Performed by: INTERNAL MEDICINE

## 2023-03-07 PROCEDURE — 82728 ASSAY OF FERRITIN: CPT | Performed by: INTERNAL MEDICINE

## 2023-03-13 ENCOUNTER — OFFICE VISIT (OUTPATIENT)
Dept: HEMATOLOGY/ONCOLOGY | Facility: CLINIC | Age: 78
End: 2023-03-13
Payer: COMMERCIAL

## 2023-03-13 VITALS
DIASTOLIC BLOOD PRESSURE: 75 MMHG | RESPIRATION RATE: 18 BRPM | SYSTOLIC BLOOD PRESSURE: 152 MMHG | TEMPERATURE: 98 F | HEIGHT: 66 IN | WEIGHT: 151.88 LBS | HEART RATE: 76 BPM | BODY MASS INDEX: 24.41 KG/M2

## 2023-03-13 DIAGNOSIS — E83.118 OTHER HEMOCHROMATOSIS: Primary | ICD-10-CM

## 2023-03-13 PROCEDURE — 1159F MED LIST DOCD IN RCRD: CPT | Mod: CPTII,S$GLB,, | Performed by: INTERNAL MEDICINE

## 2023-03-13 PROCEDURE — 3288F FALL RISK ASSESSMENT DOCD: CPT | Mod: CPTII,S$GLB,, | Performed by: INTERNAL MEDICINE

## 2023-03-13 PROCEDURE — 3078F PR MOST RECENT DIASTOLIC BLOOD PRESSURE < 80 MM HG: ICD-10-PCS | Mod: CPTII,S$GLB,, | Performed by: INTERNAL MEDICINE

## 2023-03-13 PROCEDURE — 3077F SYST BP >= 140 MM HG: CPT | Mod: CPTII,S$GLB,, | Performed by: INTERNAL MEDICINE

## 2023-03-13 PROCEDURE — 1101F PR PT FALLS ASSESS DOC 0-1 FALLS W/OUT INJ PAST YR: ICD-10-PCS | Mod: CPTII,S$GLB,, | Performed by: INTERNAL MEDICINE

## 2023-03-13 PROCEDURE — 3078F DIAST BP <80 MM HG: CPT | Mod: CPTII,S$GLB,, | Performed by: INTERNAL MEDICINE

## 2023-03-13 PROCEDURE — 3077F PR MOST RECENT SYSTOLIC BLOOD PRESSURE >= 140 MM HG: ICD-10-PCS | Mod: CPTII,S$GLB,, | Performed by: INTERNAL MEDICINE

## 2023-03-13 PROCEDURE — 1101F PT FALLS ASSESS-DOCD LE1/YR: CPT | Mod: CPTII,S$GLB,, | Performed by: INTERNAL MEDICINE

## 2023-03-13 PROCEDURE — 1126F AMNT PAIN NOTED NONE PRSNT: CPT | Mod: CPTII,S$GLB,, | Performed by: INTERNAL MEDICINE

## 2023-03-13 PROCEDURE — 1126F PR PAIN SEVERITY QUANTIFIED, NO PAIN PRESENT: ICD-10-PCS | Mod: CPTII,S$GLB,, | Performed by: INTERNAL MEDICINE

## 2023-03-13 PROCEDURE — 99214 OFFICE O/P EST MOD 30 MIN: CPT | Mod: S$GLB,,, | Performed by: INTERNAL MEDICINE

## 2023-03-13 PROCEDURE — 99214 PR OFFICE/OUTPT VISIT, EST, LEVL IV, 30-39 MIN: ICD-10-PCS | Mod: S$GLB,,, | Performed by: INTERNAL MEDICINE

## 2023-03-13 PROCEDURE — 3288F PR FALLS RISK ASSESSMENT DOCUMENTED: ICD-10-PCS | Mod: CPTII,S$GLB,, | Performed by: INTERNAL MEDICINE

## 2023-03-13 PROCEDURE — 1159F PR MEDICATION LIST DOCUMENTED IN MEDICAL RECORD: ICD-10-PCS | Mod: CPTII,S$GLB,, | Performed by: INTERNAL MEDICINE

## 2023-03-14 NOTE — PROGRESS NOTES
[facility] hematology oncology in office follow-up encounter progress note    3/13/23    Subjective:      Patient ID:   Payton Gutierrez  77 y.o. female  1945  [place]      Chief Complaint:   Iron disorder follow up    HPI:  77 y.o. female with diagnosis of increased iron stores, hemochromatosis treated with intermittent phlebotomy in the past. Ferritin is 117 , Hgb 14 now, phlebotomy is on hold. Her last therapeutic phlebotomy   was 5 years  Ago.    Ferritin went from 132 to 152 in 6 months.  Ferritin 206.  Hgb 13.6.  Resumed phlebotomy schedule, orders written.  Now Ferritin is 32, Hgb 12.6, observe for now.    She has not had covid 19 infection.  She did take 2/2 vaccines.  She is undecided if she will take the booster vaccine.    Had recent episode of diverticulitis, being evaluated for surgery is sx recur.  R hip pain sx, DDD.    She has history of GERD, glaucoma, hypercholesterolemia,hypertension, and has been diagnosed with Parkinson's disease.  She sees Dr. Spencer.  He tells me her Parkinson's symptoms have resolved, and she is off Parkinson medications.    She is status post tonsillectomy, appendectomy, complete hysterectomy.    She is a retired , she is allergic to codeine. She does not smoke, she does not drink alcohol with regularity.    Her dad passed away of a motor vehicle accident and her mother  of vaginal cancer. She has a brother with the hemochromatosis gene and increased ferritin on intermittent phlebotomy.    She denies gallbladder disease jaundice or hepatitis.  She is on lithium.  She is seeing Dr. Watts for renal dx.  She recently had diverticulitis.    2nd cousin Adry STARKS.    ROS:   GEN: normal without any fever, night sweats or weight loss  HEENT: normal with no HA's, sore throat, stiff neck, changes in vision  CV: normal with no CP, SOB, PND, SAUNDERS or orthopnea  PULM: normal with no SOB, cough, hemoptysis, sputum or pleuritic pain  GI: normal with no abdominal  "pain, nausea, vomiting, constipation, diarrhea, melanotic stools, BRBPR, or hematemesis  : normal with no hematuria, dysuria  BREAST: normal with no mass, discharge, pain  SKIN: normal with no rash, erythema, bruising, or swelling     Past Medical History:   Diagnosis Date    Glaucoma      Past Surgical History:   Procedure Laterality Date    CATARACT EXTRACTION  2016    HYSTERECTOMY         Review of patient's allergies indicates:   Allergen Reactions    Codeine     Sulfa (sulfonamide antibiotics)            Current Outpatient Medications:     ergocalciferol, vitamin D2, (VITAMIN D ORAL), Take 1 tablet by mouth once daily., Disp: , Rfl:     lisinopriL (PRINIVIL,ZESTRIL) 20 MG tablet, Take 1 tablet (20 mg total) by mouth once daily., Disp: 90 tablet, Rfl: 1    LUMIGAN 0.01 % Drop, Place 1 drop into both eyes every evening., Disp: , Rfl:     nitrofurantoin, macrocrystal-monohydrate, (MACROBID) 100 MG capsule, Take 1 capsule (100 mg total) by mouth 2 (two) times daily., Disp: 20 capsule, Rfl: 0    omeprazole (PRILOSEC) 20 MG capsule, Take 1 capsule (20 mg total) by mouth once daily., Disp: 90 capsule, Rfl: 1    rosuvastatin (CRESTOR) 10 MG tablet, Take 1 tablet (10 mg total) by mouth once daily., Disp: 90 tablet, Rfl: 1    cefUROXime (CEFTIN) 500 MG tablet, Take 1 tablet (500 mg total) by mouth every 12 (twelve) hours., Disp: 14 tablet, Rfl: 0    diclofenac sodium (VOLTAREN) 1 % Gel, Apply 2 g topically once daily. for 10 days, Disp: 450 g, Rfl: 0    FLUoxetine 10 MG capsule, Take 1 capsule (10 mg total) by mouth once daily., Disp: 90 capsule, Rfl: 1    lithium carbonate 150 MG capsule, Take 1 capsule (150 mg total) by mouth once daily., Disp: 90 capsule, Rfl: 1    metroNIDAZOLE (FLAGYL) 500 MG tablet, Take 1 tablet (500 mg total) by mouth 3 (three) times daily., Disp: 21 tablet, Rfl: 0          Objective:   Vitals:  Blood pressure (!) 152/75, pulse 76, temperature 97.7 °F (36.5 °C), resp. rate 18, height 5' 6" " (1.676 m), weight 68.9 kg (151 lb 14.4 oz).    Physical Examination:   GEN: no apparent distress, comfortable  HEAD: atraumatic and normocephalic  EYES: no pallor, no icterus  ENT: OMM, no pharyngeal erythema, external ears WNL; no nasal discharge; no thrush  NECK: no masses, thyroid normal, trachea midline, no LAD/LN's, supple  CV: RRR with no murmur; normal pulse; normal S1 and S2; no pedal edema  CHEST: Normal respiratory effort; CTAB; normal breath sounds; no wheeze or crackles  ABDOM: nontender and nondistended; soft;  no rebound/guarding, the liver and spleen are not palpable  MUSC/Skeletal: ROM normal; no crepitus; joints normal  EXTREM: no clubbing, cyanosis, inflammation or swelling  SKIN: no rashes, lesions, ulcers, petechia  : no cvat  NEURO: grossly intact; motor/sensory WNL;  no tremors  PSYCH: normal mood, affect and behavior  LYMPH: normal cervical, supraclavicular, axillary and groin LN's  BREASTS:she left the bra on, I did not examine the breasts    Radiology/Diagnostic Studies:    Ferritin 152 to 206.  Hgb 13.  Stable.    Dr. Jain did colonoscopy on August 17, 2017, diverticular disease were found, biopsy was negative for colitis.      Assessment:   (1) 77 y.o. female with diagnosis of increased iron stores, or hemochromatosis. She is status post intermittent phlebotomy in the past. Ferritin level is now 32.  Observe for now.    (2)other history includes hypertension, hypercholesterolemia, she will follow-up with Dr. Landeros for primary care.     (3)She has recently been diagnosed with Parkinson's disease and will follow-up with Dr. Spencer for that condition and Dr. Chapa for renal.    Return to clinic here in 6 months with CBC and ferritin.

## 2023-05-04 ENCOUNTER — TELEPHONE (OUTPATIENT)
Dept: FAMILY MEDICINE | Facility: CLINIC | Age: 78
End: 2023-05-04

## 2023-05-04 DIAGNOSIS — Z79.899 ENCOUNTER FOR LONG-TERM (CURRENT) USE OF OTHER MEDICATIONS: Primary | ICD-10-CM

## 2023-05-04 DIAGNOSIS — N18.31 STAGE 3A CHRONIC KIDNEY DISEASE: ICD-10-CM

## 2023-05-04 DIAGNOSIS — I10 ESSENTIAL HYPERTENSION: ICD-10-CM

## 2023-05-04 DIAGNOSIS — E78.00 HIGH CHOLESTEROL: ICD-10-CM

## 2023-05-04 DIAGNOSIS — F31.76 BIPOLAR DISORDER, IN FULL REMISSION, MOST RECENT EPISODE DEPRESSED: ICD-10-CM

## 2023-05-04 NOTE — TELEPHONE ENCOUNTER
----- Message from Dotty Garnett sent at 5/4/2023  3:24 PM CDT -----  Vm- pt would like to know if she needs blood work before her appt   [phone]

## 2023-05-06 LAB
ALBUMIN SERPL-MCNC: 4.1 G/DL (ref 3.6–5.1)
ALBUMIN/CREAT UR: 8 MCG/MG CREAT
ALBUMIN/GLOB SERPL: 1.8 (CALC) (ref 1–2.5)
ALP SERPL-CCNC: 112 U/L (ref 37–153)
ALT SERPL-CCNC: 11 U/L (ref 6–29)
APPEARANCE UR: CLEAR
AST SERPL-CCNC: 14 U/L (ref 10–35)
BACTERIA #/AREA URNS HPF: ABNORMAL /HPF
BACTERIA UR CULT: ABNORMAL
BASOPHILS # BLD AUTO: 82 CELLS/UL (ref 0–200)
BASOPHILS NFR BLD AUTO: 1.7 %
BILIRUB SERPL-MCNC: 0.7 MG/DL (ref 0.2–1.2)
BILIRUB UR QL STRIP: NEGATIVE
BUN SERPL-MCNC: 19 MG/DL (ref 7–25)
BUN/CREAT SERPL: 16 (CALC) (ref 6–22)
CALCIUM SERPL-MCNC: 9.5 MG/DL (ref 8.6–10.4)
CHLORIDE SERPL-SCNC: 108 MMOL/L (ref 98–110)
CHOLEST SERPL-MCNC: 144 MG/DL
CHOLEST/HDLC SERPL: 2.6 (CALC)
CO2 SERPL-SCNC: 23 MMOL/L (ref 20–32)
COLOR UR: YELLOW
CREAT SERPL-MCNC: 1.21 MG/DL (ref 0.6–1)
CREAT UR-MCNC: 118 MG/DL (ref 20–275)
EGFR: 46 ML/MIN/1.73M2
EOSINOPHIL # BLD AUTO: 269 CELLS/UL (ref 15–500)
EOSINOPHIL NFR BLD AUTO: 5.6 %
ERYTHROCYTE [DISTWIDTH] IN BLOOD BY AUTOMATED COUNT: 12.9 % (ref 11–15)
GLOBULIN SER CALC-MCNC: 2.3 G/DL (CALC) (ref 1.9–3.7)
GLUCOSE SERPL-MCNC: 97 MG/DL (ref 65–99)
GLUCOSE UR QL STRIP: NEGATIVE
HCT VFR BLD AUTO: 39.9 % (ref 35–45)
HDLC SERPL-MCNC: 56 MG/DL
HGB BLD-MCNC: 13.3 G/DL (ref 11.7–15.5)
HGB UR QL STRIP: ABNORMAL
HYALINE CASTS #/AREA URNS LPF: ABNORMAL /LPF
KETONES UR QL STRIP: NEGATIVE
LDLC SERPL CALC-MCNC: 72 MG/DL (CALC)
LEUKOCYTE ESTERASE UR QL STRIP: NEGATIVE
LITHIUM SERPL-SCNC: <0.3 MMOL/L (ref 0.6–1.2)
LYMPHOCYTES # BLD AUTO: 1435 CELLS/UL (ref 850–3900)
LYMPHOCYTES NFR BLD AUTO: 29.9 %
MCH RBC QN AUTO: 30.1 PG (ref 27–33)
MCHC RBC AUTO-ENTMCNC: 33.3 G/DL (ref 32–36)
MCV RBC AUTO: 90.3 FL (ref 80–100)
MICROALBUMIN UR-MCNC: 0.9 MG/DL
MONOCYTES # BLD AUTO: 379 CELLS/UL (ref 200–950)
MONOCYTES NFR BLD AUTO: 7.9 %
NEUTROPHILS # BLD AUTO: 2635 CELLS/UL (ref 1500–7800)
NEUTROPHILS NFR BLD AUTO: 54.9 %
NITRITE UR QL STRIP: NEGATIVE
NONHDLC SERPL-MCNC: 88 MG/DL (CALC)
PH UR STRIP: ABNORMAL [PH] (ref 5–8)
PLATELET # BLD AUTO: 206 THOUSAND/UL (ref 140–400)
PMV BLD REES-ECKER: 10 FL (ref 7.5–12.5)
POTASSIUM SERPL-SCNC: 4.3 MMOL/L (ref 3.5–5.3)
PROT SERPL-MCNC: 6.4 G/DL (ref 6.1–8.1)
PROT UR QL STRIP: NEGATIVE
RBC # BLD AUTO: 4.42 MILLION/UL (ref 3.8–5.1)
RBC #/AREA URNS HPF: ABNORMAL /HPF
SERVICE CMNT-IMP: ABNORMAL
SODIUM SERPL-SCNC: 140 MMOL/L (ref 135–146)
SP GR UR STRIP: 1.02 (ref 1–1.03)
SQUAMOUS #/AREA URNS HPF: ABNORMAL /HPF
TRIGL SERPL-MCNC: 82 MG/DL
TSH SERPL-ACNC: 0.94 MIU/L (ref 0.4–4.5)
URATE CRY #/AREA URNS HPF: ABNORMAL /HPF
WBC # BLD AUTO: 4.8 THOUSAND/UL (ref 3.8–10.8)
WBC #/AREA URNS HPF: ABNORMAL /HPF

## 2023-05-08 ENCOUNTER — TELEPHONE (OUTPATIENT)
Dept: FAMILY MEDICINE | Facility: CLINIC | Age: 78
End: 2023-05-08

## 2023-05-11 ENCOUNTER — OFFICE VISIT (OUTPATIENT)
Dept: FAMILY MEDICINE | Facility: CLINIC | Age: 78
End: 2023-05-11
Payer: COMMERCIAL

## 2023-05-11 VITALS
SYSTOLIC BLOOD PRESSURE: 136 MMHG | WEIGHT: 151 LBS | HEART RATE: 60 BPM | DIASTOLIC BLOOD PRESSURE: 70 MMHG | HEIGHT: 63 IN | BODY MASS INDEX: 26.75 KG/M2

## 2023-05-11 DIAGNOSIS — E78.00 HIGH CHOLESTEROL: ICD-10-CM

## 2023-05-11 DIAGNOSIS — E83.118 OTHER HEMOCHROMATOSIS: ICD-10-CM

## 2023-05-11 DIAGNOSIS — I10 ESSENTIAL HYPERTENSION: ICD-10-CM

## 2023-05-11 DIAGNOSIS — G20.A1 PARKINSON DISEASE: ICD-10-CM

## 2023-05-11 DIAGNOSIS — K21.9 GASTROESOPHAGEAL REFLUX DISEASE WITHOUT ESOPHAGITIS: ICD-10-CM

## 2023-05-11 DIAGNOSIS — H90.0 CONDUCTIVE HEARING LOSS, BILATERAL: ICD-10-CM

## 2023-05-11 DIAGNOSIS — M51.9 LUMBAR DISC DISEASE: Primary | ICD-10-CM

## 2023-05-11 DIAGNOSIS — Z78.0 MENOPAUSE: ICD-10-CM

## 2023-05-11 DIAGNOSIS — F31.76 BIPOLAR DISORDER, IN FULL REMISSION, MOST RECENT EPISODE DEPRESSED: ICD-10-CM

## 2023-05-11 DIAGNOSIS — N18.31 STAGE 3A CHRONIC KIDNEY DISEASE: ICD-10-CM

## 2023-05-11 PROCEDURE — 3288F FALL RISK ASSESSMENT DOCD: CPT | Mod: CPTII,S$GLB,, | Performed by: FAMILY MEDICINE

## 2023-05-11 PROCEDURE — 3288F PR FALLS RISK ASSESSMENT DOCUMENTED: ICD-10-PCS | Mod: CPTII,S$GLB,, | Performed by: FAMILY MEDICINE

## 2023-05-11 PROCEDURE — 3075F PR MOST RECENT SYSTOLIC BLOOD PRESS GE 130-139MM HG: ICD-10-PCS | Mod: CPTII,S$GLB,, | Performed by: FAMILY MEDICINE

## 2023-05-11 PROCEDURE — 3078F DIAST BP <80 MM HG: CPT | Mod: CPTII,S$GLB,, | Performed by: FAMILY MEDICINE

## 2023-05-11 PROCEDURE — 1159F PR MEDICATION LIST DOCUMENTED IN MEDICAL RECORD: ICD-10-PCS | Mod: CPTII,S$GLB,, | Performed by: FAMILY MEDICINE

## 2023-05-11 PROCEDURE — 1101F PR PT FALLS ASSESS DOC 0-1 FALLS W/OUT INJ PAST YR: ICD-10-PCS | Mod: CPTII,S$GLB,, | Performed by: FAMILY MEDICINE

## 2023-05-11 PROCEDURE — 99214 OFFICE O/P EST MOD 30 MIN: CPT | Mod: S$GLB,,, | Performed by: FAMILY MEDICINE

## 2023-05-11 PROCEDURE — 3078F PR MOST RECENT DIASTOLIC BLOOD PRESSURE < 80 MM HG: ICD-10-PCS | Mod: CPTII,S$GLB,, | Performed by: FAMILY MEDICINE

## 2023-05-11 PROCEDURE — 1159F MED LIST DOCD IN RCRD: CPT | Mod: CPTII,S$GLB,, | Performed by: FAMILY MEDICINE

## 2023-05-11 PROCEDURE — 99214 PR OFFICE/OUTPT VISIT, EST, LEVL IV, 30-39 MIN: ICD-10-PCS | Mod: S$GLB,,, | Performed by: FAMILY MEDICINE

## 2023-05-11 PROCEDURE — 1101F PT FALLS ASSESS-DOCD LE1/YR: CPT | Mod: CPTII,S$GLB,, | Performed by: FAMILY MEDICINE

## 2023-05-11 PROCEDURE — 3075F SYST BP GE 130 - 139MM HG: CPT | Mod: CPTII,S$GLB,, | Performed by: FAMILY MEDICINE

## 2023-05-11 RX ORDER — LISINOPRIL 20 MG/1
20 TABLET ORAL DAILY
Qty: 90 TABLET | Refills: 3 | Status: SHIPPED | OUTPATIENT
Start: 2023-05-11 | End: 2023-08-30

## 2023-05-11 RX ORDER — LITHIUM CARBONATE 150 MG/1
150 CAPSULE ORAL DAILY
Qty: 90 CAPSULE | Refills: 3 | Status: SHIPPED | OUTPATIENT
Start: 2023-05-11 | End: 2023-06-10

## 2023-05-11 RX ORDER — OMEPRAZOLE 20 MG/1
20 CAPSULE, DELAYED RELEASE ORAL DAILY
Qty: 90 CAPSULE | Refills: 3 | Status: SHIPPED | OUTPATIENT
Start: 2023-05-11

## 2023-05-11 RX ORDER — ROSUVASTATIN CALCIUM 10 MG/1
10 TABLET, COATED ORAL DAILY
Qty: 90 TABLET | Refills: 3 | Status: SHIPPED | OUTPATIENT
Start: 2023-05-11

## 2023-05-11 NOTE — PROGRESS NOTES
SUBJECTIVE:    Patient ID: Payton Gutierrez is a 77 y.o. female.    Chief Complaint: Hypertension (Brought bottles, need refills, bilateral hip pain, abc )    77-year-old female here for six-month checkup.  She has lumbar degenerative disc disease worse at L5-S1.  She has left hip pain radiating down to the left thigh.  Despite that she is still able to walk 30 minutes 5 times a week.  She does not want aggressive surgical options for her back pain.  Does not take the meloxicam.    History of diverticulitis, now takes fiber gummies and increased vegetables in her diet.    Bipolar illness, her psychiatrist has retired patient stays on lithium 150 mg 1 tablet daily.  She stopped her Prozac 10 mg daily and feels well.  She has a good appetite.    Parkinson's disease-on no medications, no tremors and no gait disturbances noted.    Hemochromatosis-has been monitoring her ferritin levels and they were somewhat increased so she started her phlebotomies again.  Ferritin dropped from 206 down to 32.    -chronic kidney disease-surveillance only.      Telephone on 05/04/2023   Component Date Value Ref Range Status    WBC 05/05/2023 4.8  3.8 - 10.8 Thousand/uL Final    RBC 05/05/2023 4.42  3.80 - 5.10 Million/uL Final    Hemoglobin 05/05/2023 13.3  11.7 - 15.5 g/dL Final    Hematocrit 05/05/2023 39.9  35.0 - 45.0 % Final    MCV 05/05/2023 90.3  80.0 - 100.0 fL Final    MCH 05/05/2023 30.1  27.0 - 33.0 pg Final    MCHC 05/05/2023 33.3  32.0 - 36.0 g/dL Final    RDW 05/05/2023 12.9  11.0 - 15.0 % Final    Platelets 05/05/2023 206  140 - 400 Thousand/uL Final    MPV 05/05/2023 10.0  7.5 - 12.5 fL Final    Neutrophils, Abs 05/05/2023 2,635  1,500 - 7,800 cells/uL Final    Lymph # 05/05/2023 1,435  850 - 3,900 cells/uL Final    Mono # 05/05/2023 379  200 - 950 cells/uL Final    Eos # 05/05/2023 269  15 - 500 cells/uL Final    Baso # 05/05/2023 82  0 - 200 cells/uL Final    Neutrophils Relative 05/05/2023 54.9  % Final     Lymph % 05/05/2023 29.9  % Final    Mono % 05/05/2023 7.9  % Final    Eosinophil % 05/05/2023 5.6  % Final    Basophil % 05/05/2023 1.7  % Final    Glucose 05/05/2023 97  65 - 99 mg/dL Final    BUN 05/05/2023 19  7 - 25 mg/dL Final    Creatinine 05/05/2023 1.21 (H)  0.60 - 1.00 mg/dL Final    eGFR 05/05/2023 46 (L)  > OR = 60 mL/min/1.73m2 Final    BUN/Creatinine Ratio 05/05/2023 16  6 - 22 (calc) Final    Sodium 05/05/2023 140  135 - 146 mmol/L Final    Potassium 05/05/2023 4.3  3.5 - 5.3 mmol/L Final    Chloride 05/05/2023 108  98 - 110 mmol/L Final    CO2 05/05/2023 23  20 - 32 mmol/L Final    Calcium 05/05/2023 9.5  8.6 - 10.4 mg/dL Final    Total Protein 05/05/2023 6.4  6.1 - 8.1 g/dL Final    Albumin 05/05/2023 4.1  3.6 - 5.1 g/dL Final    Globulin, Total 05/05/2023 2.3  1.9 - 3.7 g/dL (calc) Final    Albumin/Globulin Ratio 05/05/2023 1.8  1.0 - 2.5 (calc) Final    Total Bilirubin 05/05/2023 0.7  0.2 - 1.2 mg/dL Final    Alkaline Phosphatase 05/05/2023 112  37 - 153 U/L Final    AST 05/05/2023 14  10 - 35 U/L Final    ALT 05/05/2023 11  6 - 29 U/L Final    Cholesterol 05/05/2023 144  <200 mg/dL Final    HDL 05/05/2023 56  > OR = 50 mg/dL Final    Triglycerides 05/05/2023 82  <150 mg/dL Final    LDL Cholesterol 05/05/2023 72  mg/dL (calc) Final    HDL/Cholesterol Ratio 05/05/2023 2.6  <5.0 (calc) Final    Non HDL Chol. (LDL+VLDL) 05/05/2023 88  <130 mg/dL (calc) Final    TSH w/reflex to FT4 05/05/2023 0.94  0.40 - 4.50 mIU/L Final    Color, UA 05/05/2023 YELLOW  YELLOW Final    Appearance, UA 05/05/2023 CLEAR  CLEAR Final    Specific Gravity, UA 05/05/2023 1.018  1.001 - 1.035 Final    pH, UA 05/05/2023 < OR = 5.0  5.0 - 8.0 Final    Glucose, UA 05/05/2023 NEGATIVE  NEGATIVE Final    Bilirubin, UA 05/05/2023 NEGATIVE  NEGATIVE Final    Ketones, UA 05/05/2023 NEGATIVE  NEGATIVE Final    Occult Blood UA 05/05/2023 TRACE (A)  NEGATIVE Final    Protein, UA 05/05/2023 NEGATIVE  NEGATIVE Final    Nitrite, UA  05/05/2023 NEGATIVE  NEGATIVE Final    Leukocytes, UA 05/05/2023 NEGATIVE  NEGATIVE Final    WBC Casts, UA 05/05/2023 NONE SEEN  < OR = 5 /HPF Final    RBC Casts, UA 05/05/2023 NONE SEEN  < OR = 2 /HPF Final    Squam Epithel, UA 05/05/2023 NONE SEEN  < OR = 5 /HPF Final    Bacteria, UA 05/05/2023 NONE SEEN  NONE SEEN /HPF Final    Uric Acid Niru, UA 05/05/2023 MANY (A)  NONE OR FEW /HPF Final    Hyaline Casts, UA 05/05/2023 NONE SEEN  NONE SEEN /LPF Final    Service Cmt: 05/05/2023    Final    Reflexive Urine Culture 05/05/2023    Final    Creatinine, Urine 05/05/2023 118  20 - 275 mg/dL Final    Microalb, Ur 05/05/2023 0.9  See Note: mg/dL Final    Microalb/Creat Ratio 05/05/2023 8  <30 mcg/mg creat Final    Lithium Level 05/05/2023 <0.3 (L)  0.6 - 1.2 mmol/L Final   Lab Visit on 03/07/2023   Component Date Value Ref Range Status    Ferritin 03/07/2023 32  20.0 - 300.0 ng/mL Final    WBC 03/07/2023 7.70  3.90 - 12.70 K/uL Final    RBC 03/07/2023 4.22  4.00 - 5.40 M/uL Final    Hemoglobin 03/07/2023 12.6  12.0 - 16.0 g/dL Final    Hematocrit 03/07/2023 37.8  37.0 - 48.5 % Final    MCV 03/07/2023 90  82 - 98 fL Final    MCH 03/07/2023 29.9  27.0 - 31.0 pg Final    MCHC 03/07/2023 33.3  32.0 - 36.0 g/dL Final    RDW 03/07/2023 13.6  11.5 - 14.5 % Final    Platelets 03/07/2023 234  150 - 450 K/uL Final    MPV 03/07/2023 10.2  9.2 - 12.9 fL Final    Immature Granulocytes 03/07/2023 0.1  0.0 - 0.5 % Final    Gran # (ANC) 03/07/2023 5.2  1.8 - 7.7 K/uL Final    Immature Grans (Abs) 03/07/2023 0.01  0.00 - 0.04 K/uL Final    Lymph # 03/07/2023 1.6  1.0 - 4.8 K/uL Final    Mono # 03/07/2023 0.5  0.3 - 1.0 K/uL Final    Eos # 03/07/2023 0.3  0.0 - 0.5 K/uL Final    Baso # 03/07/2023 0.07  0.00 - 0.20 K/uL Final    nRBC 03/07/2023 0  0 /100 WBC Final    Gran % 03/07/2023 67.7  38.0 - 73.0 % Final    Lymph % 03/07/2023 20.5  18.0 - 48.0 % Final    Mono % 03/07/2023 7.0  4.0 - 15.0 % Final    Eosinophil % 03/07/2023 3.8  0.0 -  8.0 % Final    Basophil % 03/07/2023 0.9  0.0 - 1.9 % Final    Differential Method 03/07/2023 Automated   Final   Office Visit on 11/16/2022   Component Date Value Ref Range Status    POC Blood, Urine 11/16/2022 Positive (A)  Negative Final    POC Bilirubin, Urine 11/16/2022 Negative  Negative Final    POC Urobilinogen, Urine 11/16/2022 negative  0.1 - 1.1 Final    POC Ketones, Urine 11/16/2022 Negative  Negative Final    POC Protein, Urine 11/16/2022 Positive (A)  Negative Final    POC Nitrates, Urine 11/16/2022 Negative  Negative Final    POC Glucose, Urine 11/16/2022 Negative  Negative Final    pH, UA 11/16/2022 5.5   Final    POC Specific Gravity, Urine 11/16/2022 1.030 (A)  1.003 - 1.029 Final    POC Leukocytes, Urine 11/16/2022 Positive (A)  Negative Final    Urine Culture, Routine 11/16/2022  (A)   Final       Past Medical History:   Diagnosis Date    Glaucoma      Social History     Socioeconomic History    Marital status: Single   Tobacco Use    Smoking status: Never    Smokeless tobacco: Never   Substance and Sexual Activity    Alcohol use: No    Drug use: No     Past Surgical History:   Procedure Laterality Date    CATARACT EXTRACTION  2016    HYSTERECTOMY       Family History   Problem Relation Age of Onset    Cancer Mother        Review of patient's allergies indicates:   Allergen Reactions    Codeine     Sulfa (sulfonamide antibiotics)     Sulfur Other (See Comments)       Current Outpatient Medications:     ergocalciferol, vitamin D2, (VITAMIN D ORAL), Take 1 tablet by mouth once daily., Disp: , Rfl:     LUMIGAN 0.01 % Drop, Place 1 drop into both eyes every evening., Disp: , Rfl:     cefUROXime (CEFTIN) 500 MG tablet, Take 1 tablet (500 mg total) by mouth every 12 (twelve) hours. (Patient not taking: Reported on 4/20/2023), Disp: 14 tablet, Rfl: 0    diclofenac sodium (VOLTAREN) 1 % Gel, Apply 2 g topically once daily. for 10 days, Disp: 450 g, Rfl: 0    FLUoxetine 10 MG capsule, Take 1 capsule (10  mg total) by mouth once daily. (Patient not taking: Reported on 4/20/2023), Disp: 90 capsule, Rfl: 1    lisinopriL (PRINIVIL,ZESTRIL) 20 MG tablet, Take 1 tablet (20 mg total) by mouth once daily., Disp: 90 tablet, Rfl: 3    lithium carbonate 150 MG capsule, Take 1 capsule (150 mg total) by mouth once daily., Disp: 90 capsule, Rfl: 3    metroNIDAZOLE (FLAGYL) 500 MG tablet, Take 1 tablet (500 mg total) by mouth 3 (three) times daily. (Patient not taking: Reported on 4/20/2023), Disp: 21 tablet, Rfl: 0    nitrofurantoin, macrocrystal-monohydrate, (MACROBID) 100 MG capsule, Take 1 capsule (100 mg total) by mouth 2 (two) times daily. (Patient not taking: Reported on 5/11/2023), Disp: 20 capsule, Rfl: 0    omeprazole (PRILOSEC) 20 MG capsule, Take 1 capsule (20 mg total) by mouth once daily., Disp: 90 capsule, Rfl: 3    rosuvastatin (CRESTOR) 10 MG tablet, Take 1 tablet (10 mg total) by mouth once daily., Disp: 90 tablet, Rfl: 3    Review of Systems   Constitutional:  Negative for appetite change, chills, fatigue, fever and unexpected weight change.   HENT:  Negative for ear discharge and ear pain.    Eyes:  Negative for pain, discharge and visual disturbance.   Respiratory:  Negative for apnea, cough, shortness of breath and wheezing.    Cardiovascular:  Negative for chest pain, palpitations and leg swelling.   Gastrointestinal:  Positive for constipation (fiber gummies  working  well). Negative for abdominal pain, blood in stool, diarrhea, nausea, vomiting and reflux.   Endocrine: Negative for cold intolerance, heat intolerance and polydipsia.   Genitourinary:  Negative for bladder incontinence, dysuria, hematuria, nocturia and urgency.   Musculoskeletal:  Positive for arthralgias and back pain. Negative for gait problem, joint swelling and myalgias.   Neurological:  Negative for dizziness, seizures and numbness.   Psychiatric/Behavioral:  Negative for behavioral problems and hallucinations. The patient is not  "nervous/anxious.          Objective:      Vitals:    05/11/23 1032   BP: 136/70   Pulse: 60   Weight: 68.5 kg (151 lb)   Height: 5' 3" (1.6 m)     Physical Exam  Vitals and nursing note reviewed.   Constitutional:       General: She is not in acute distress.     Appearance: Normal appearance. She is well-developed. She is not toxic-appearing.   HENT:      Head: Normocephalic and atraumatic.      Right Ear: Tympanic membrane and external ear normal.      Left Ear: Tympanic membrane and external ear normal.      Nose: Nose normal.      Mouth/Throat:      Pharynx: Oropharynx is clear.   Eyes:      Pupils: Pupils are equal, round, and reactive to light.   Neck:      Thyroid: No thyromegaly.      Vascular: No carotid bruit.   Cardiovascular:      Rate and Rhythm: Normal rate and regular rhythm.      Heart sounds: Normal heart sounds. No murmur heard.  Pulmonary:      Effort: Pulmonary effort is normal.      Breath sounds: Normal breath sounds. No wheezing or rales.   Abdominal:      General: Bowel sounds are normal. There is no distension.      Palpations: Abdomen is soft.      Tenderness: There is no abdominal tenderness.   Musculoskeletal:         General: No tenderness or deformity. Normal range of motion.      Cervical back: Normal range of motion and neck supple.      Lumbar back: Normal. No spasms.      Comments: Bends 90 degrees at  waist, shoulders and knees have good range of motion without crepitance.  No pitting edema to lower extremities.  Negative straight leg-raising test bilaterally.   Lymphadenopathy:      Cervical: No cervical adenopathy.   Skin:     General: Skin is warm and dry.      Findings: No rash.   Neurological:      General: No focal deficit present.      Mental Status: She is alert and oriented to person, place, and time. Mental status is at baseline.      Cranial Nerves: No cranial nerve deficit.      Coordination: Coordination normal.      Comments: No tremor or cogwheeling noted. "   Psychiatric:         Mood and Affect: Mood normal.         Behavior: Behavior normal.         Thought Content: Thought content normal.         Judgment: Judgment normal.         Assessment:       1. Lumbar disc disease    2. Essential hypertension    3. Bipolar disorder, in full remission, most recent episode depressed    4. Gastroesophageal reflux disease without esophagitis    5. High cholesterol    6. Parkinson disease    7. Conductive hearing loss, bilateral    8. Stage 3a chronic kidney disease    9. Menopause    10. Other hemochromatosis         Plan:       Lumbar disc disease  Patient managing her lumbar disc disease conservatively, does not want surgery or aggressive pain management options at this time  Essential hypertension  -     lisinopriL (PRINIVIL,ZESTRIL) 20 MG tablet; Take 1 tablet (20 mg total) by mouth once daily.  Dispense: 90 tablet; Refill: 3  Blood pressure well controlled  Bipolar disorder, in full remission, most recent episode depressed  -     lithium carbonate 150 MG capsule; Take 1 capsule (150 mg total) by mouth once daily.  Dispense: 90 capsule; Refill: 3  Lithium levels less than 0.3, not toxic  Gastroesophageal reflux disease without esophagitis  -     omeprazole (PRILOSEC) 20 MG capsule; Take 1 capsule (20 mg total) by mouth once daily.  Dispense: 90 capsule; Refill: 3  Continue omeprazole as needed  High cholesterol  -     rosuvastatin (CRESTOR) 10 MG tablet; Take 1 tablet (10 mg total) by mouth once daily.  Dispense: 90 tablet; Refill: 3  Cholesterol 144 HDL 58 TG 82 LDL 72 excellent lipid panel.  Parkinson disease  Asymptomatic at this time  Conductive hearing loss, bilateral    Stage 3a chronic kidney disease  Creatinine 1.21 GFR 46  Menopause    Other hemochromatosis  Phlebotomies working well    Follow up in about 6 months (around 11/11/2023), or L disc.        5/11/2023 Max Landeros

## 2023-06-22 ENCOUNTER — TELEPHONE (OUTPATIENT)
Dept: FAMILY MEDICINE | Facility: CLINIC | Age: 78
End: 2023-06-22

## 2023-06-22 NOTE — TELEPHONE ENCOUNTER
----- Message from Trena Wallace sent at 6/22/2023  9:36 AM CDT -----  Pt thinks she has a UTI and wants to be seen asap. Pt #[phone]

## 2023-06-22 NOTE — TELEPHONE ENCOUNTER
----- Message from Faith Mckeon MA sent at 6/22/2023  2:23 PM CDT -----  Regarding: UTI symptoms  Patient calling a second time today for uti symptoms. Please call patient.  [phone]

## 2023-06-22 NOTE — TELEPHONE ENCOUNTER
Spoke with patient and offered her to come in for a nurse visit today but she refused, states she has MARY ANN weathers at her house and requested an appt tomorrow. Scheduled.

## 2023-06-23 ENCOUNTER — OFFICE VISIT (OUTPATIENT)
Dept: FAMILY MEDICINE | Facility: CLINIC | Age: 78
End: 2023-06-23
Attending: FAMILY MEDICINE
Payer: COMMERCIAL

## 2023-06-23 VITALS
HEIGHT: 63 IN | DIASTOLIC BLOOD PRESSURE: 70 MMHG | SYSTOLIC BLOOD PRESSURE: 138 MMHG | HEART RATE: 78 BPM | BODY MASS INDEX: 26.75 KG/M2 | WEIGHT: 151 LBS

## 2023-06-23 DIAGNOSIS — D69.2 PURPURA SENILIS: ICD-10-CM

## 2023-06-23 DIAGNOSIS — N30.01 ACUTE CYSTITIS WITH HEMATURIA: Primary | ICD-10-CM

## 2023-06-23 DIAGNOSIS — R39.9 UTI SYMPTOMS: ICD-10-CM

## 2023-06-23 LAB
BILIRUB UR QL STRIP: NEGATIVE
GLUCOSE UR QL STRIP: NEGATIVE
KETONES UR QL STRIP: NEGATIVE
LEUKOCYTE ESTERASE UR QL STRIP: POSITIVE
PH, POC UA: 5.5
POC BLOOD, URINE: POSITIVE
POC NITRATES, URINE: NEGATIVE
PROT UR QL STRIP: POSITIVE
SP GR UR STRIP: 1.02 (ref 1–1.03)
UROBILINOGEN UR STRIP-ACNC: NEGATIVE (ref 0.1–1.1)

## 2023-06-23 PROCEDURE — 81003 URINALYSIS AUTO W/O SCOPE: CPT | Mod: QW,S$GLB,, | Performed by: FAMILY MEDICINE

## 2023-06-23 PROCEDURE — 3075F PR MOST RECENT SYSTOLIC BLOOD PRESS GE 130-139MM HG: ICD-10-PCS | Mod: CPTII,S$GLB,, | Performed by: FAMILY MEDICINE

## 2023-06-23 PROCEDURE — 3075F SYST BP GE 130 - 139MM HG: CPT | Mod: CPTII,S$GLB,, | Performed by: FAMILY MEDICINE

## 2023-06-23 PROCEDURE — 1101F PR PT FALLS ASSESS DOC 0-1 FALLS W/OUT INJ PAST YR: ICD-10-PCS | Mod: CPTII,S$GLB,, | Performed by: FAMILY MEDICINE

## 2023-06-23 PROCEDURE — 1159F PR MEDICATION LIST DOCUMENTED IN MEDICAL RECORD: ICD-10-PCS | Mod: CPTII,S$GLB,, | Performed by: FAMILY MEDICINE

## 2023-06-23 PROCEDURE — 99213 PR OFFICE/OUTPT VISIT, EST, LEVL III, 20-29 MIN: ICD-10-PCS | Mod: S$GLB,,, | Performed by: FAMILY MEDICINE

## 2023-06-23 PROCEDURE — 1159F MED LIST DOCD IN RCRD: CPT | Mod: CPTII,S$GLB,, | Performed by: FAMILY MEDICINE

## 2023-06-23 PROCEDURE — 1101F PT FALLS ASSESS-DOCD LE1/YR: CPT | Mod: CPTII,S$GLB,, | Performed by: FAMILY MEDICINE

## 2023-06-23 PROCEDURE — 1160F RVW MEDS BY RX/DR IN RCRD: CPT | Mod: CPTII,S$GLB,, | Performed by: FAMILY MEDICINE

## 2023-06-23 PROCEDURE — 3078F PR MOST RECENT DIASTOLIC BLOOD PRESSURE < 80 MM HG: ICD-10-PCS | Mod: CPTII,S$GLB,, | Performed by: FAMILY MEDICINE

## 2023-06-23 PROCEDURE — 3288F FALL RISK ASSESSMENT DOCD: CPT | Mod: CPTII,S$GLB,, | Performed by: FAMILY MEDICINE

## 2023-06-23 PROCEDURE — 1160F PR REVIEW ALL MEDS BY PRESCRIBER/CLIN PHARMACIST DOCUMENTED: ICD-10-PCS | Mod: CPTII,S$GLB,, | Performed by: FAMILY MEDICINE

## 2023-06-23 PROCEDURE — 3288F PR FALLS RISK ASSESSMENT DOCUMENTED: ICD-10-PCS | Mod: CPTII,S$GLB,, | Performed by: FAMILY MEDICINE

## 2023-06-23 PROCEDURE — 99213 OFFICE O/P EST LOW 20 MIN: CPT | Mod: S$GLB,,, | Performed by: FAMILY MEDICINE

## 2023-06-23 PROCEDURE — 81003 POCT URINALYSIS, DIPSTICK, AUTOMATED, W/O SCOPE: ICD-10-PCS | Mod: QW,S$GLB,, | Performed by: FAMILY MEDICINE

## 2023-06-23 PROCEDURE — 3078F DIAST BP <80 MM HG: CPT | Mod: CPTII,S$GLB,, | Performed by: FAMILY MEDICINE

## 2023-06-23 RX ORDER — NITROFURANTOIN 25; 75 MG/1; MG/1
100 CAPSULE ORAL 2 TIMES DAILY
Qty: 20 CAPSULE | Refills: 0 | Status: SHIPPED | OUTPATIENT
Start: 2023-06-23 | End: 2023-06-23

## 2023-06-23 RX ORDER — NITROFURANTOIN 25; 75 MG/1; MG/1
100 CAPSULE ORAL 2 TIMES DAILY
Qty: 20 CAPSULE | Refills: 0 | Status: SHIPPED | OUTPATIENT
Start: 2023-06-23

## 2023-06-23 NOTE — PROGRESS NOTES
SUBJECTIVE:    Patient ID: Payton Gutierrez is a 77 y.o. female.    Chief Complaint: Urinary Tract Infection (Abdominal pain and urination discomfort, no bottles, declined Dexa, Dip and UA ordered, abc )    Patient here for complaints of symptoms of a UTI which began 3 days ago. She reports burning with urination, itching near her urinary meatus, increase urge and frequency. She states she has had UTIs in the past and this definitely feels like one. Denies fever, nausea, chills or back pain. Denies abdominal pain or bladder spasms.    Urinary Tract Infection   Associated symptoms include frequency and urgency.     Office Visit on 06/23/2023   Component Date Value Ref Range Status    POC Blood, Urine 06/23/2023 Positive (A)  Negative Final    POC Bilirubin, Urine 06/23/2023 Negative  Negative Final    POC Urobilinogen, Urine 06/23/2023 negative  0.1 - 1.1 Final    POC Ketones, Urine 06/23/2023 Negative  Negative Final    POC Protein, Urine 06/23/2023 Positive (A)  Negative Final    POC Nitrates, Urine 06/23/2023 Negative  Negative Final    POC Glucose, Urine 06/23/2023 Negative  Negative Final    pH, UA 06/23/2023 5.5   Final    POC Specific Gravity, Urine 06/23/2023 1.020  1.003 - 1.029 Final    POC Leukocytes, Urine 06/23/2023 Positive (A)  Negative Final   Telephone on 05/04/2023   Component Date Value Ref Range Status    WBC 05/05/2023 4.8  3.8 - 10.8 Thousand/uL Final    RBC 05/05/2023 4.42  3.80 - 5.10 Million/uL Final    Hemoglobin 05/05/2023 13.3  11.7 - 15.5 g/dL Final    Hematocrit 05/05/2023 39.9  35.0 - 45.0 % Final    MCV 05/05/2023 90.3  80.0 - 100.0 fL Final    MCH 05/05/2023 30.1  27.0 - 33.0 pg Final    MCHC 05/05/2023 33.3  32.0 - 36.0 g/dL Final    RDW 05/05/2023 12.9  11.0 - 15.0 % Final    Platelets 05/05/2023 206  140 - 400 Thousand/uL Final    MPV 05/05/2023 10.0  7.5 - 12.5 fL Final    Neutrophils, Abs 05/05/2023 2,635  1,500 - 7,800 cells/uL Final    Lymph # 05/05/2023 1,435  850 - 3,900  cells/uL Final    Mono # 05/05/2023 379  200 - 950 cells/uL Final    Eos # 05/05/2023 269  15 - 500 cells/uL Final    Baso # 05/05/2023 82  0 - 200 cells/uL Final    Neutrophils Relative 05/05/2023 54.9  % Final    Lymph % 05/05/2023 29.9  % Final    Mono % 05/05/2023 7.9  % Final    Eosinophil % 05/05/2023 5.6  % Final    Basophil % 05/05/2023 1.7  % Final    Glucose 05/05/2023 97  65 - 99 mg/dL Final    BUN 05/05/2023 19  7 - 25 mg/dL Final    Creatinine 05/05/2023 1.21 (H)  0.60 - 1.00 mg/dL Final    eGFR 05/05/2023 46 (L)  > OR = 60 mL/min/1.73m2 Final    BUN/Creatinine Ratio 05/05/2023 16  6 - 22 (calc) Final    Sodium 05/05/2023 140  135 - 146 mmol/L Final    Potassium 05/05/2023 4.3  3.5 - 5.3 mmol/L Final    Chloride 05/05/2023 108  98 - 110 mmol/L Final    CO2 05/05/2023 23  20 - 32 mmol/L Final    Calcium 05/05/2023 9.5  8.6 - 10.4 mg/dL Final    Total Protein 05/05/2023 6.4  6.1 - 8.1 g/dL Final    Albumin 05/05/2023 4.1  3.6 - 5.1 g/dL Final    Globulin, Total 05/05/2023 2.3  1.9 - 3.7 g/dL (calc) Final    Albumin/Globulin Ratio 05/05/2023 1.8  1.0 - 2.5 (calc) Final    Total Bilirubin 05/05/2023 0.7  0.2 - 1.2 mg/dL Final    Alkaline Phosphatase 05/05/2023 112  37 - 153 U/L Final    AST 05/05/2023 14  10 - 35 U/L Final    ALT 05/05/2023 11  6 - 29 U/L Final    Cholesterol 05/05/2023 144  <200 mg/dL Final    HDL 05/05/2023 56  > OR = 50 mg/dL Final    Triglycerides 05/05/2023 82  <150 mg/dL Final    LDL Cholesterol 05/05/2023 72  mg/dL (calc) Final    HDL/Cholesterol Ratio 05/05/2023 2.6  <5.0 (calc) Final    Non HDL Chol. (LDL+VLDL) 05/05/2023 88  <130 mg/dL (calc) Final    TSH w/reflex to FT4 05/05/2023 0.94  0.40 - 4.50 mIU/L Final    Color, UA 05/05/2023 YELLOW  YELLOW Final    Appearance, UA 05/05/2023 CLEAR  CLEAR Final    Specific Gravity, UA 05/05/2023 1.018  1.001 - 1.035 Final    pH, UA 05/05/2023 < OR = 5.0  5.0 - 8.0 Final    Glucose, UA 05/05/2023 NEGATIVE  NEGATIVE Final    Bilirubin, UA  05/05/2023 NEGATIVE  NEGATIVE Final    Ketones, UA 05/05/2023 NEGATIVE  NEGATIVE Final    Occult Blood UA 05/05/2023 TRACE (A)  NEGATIVE Final    Protein, UA 05/05/2023 NEGATIVE  NEGATIVE Final    Nitrite, UA 05/05/2023 NEGATIVE  NEGATIVE Final    Leukocytes, UA 05/05/2023 NEGATIVE  NEGATIVE Final    WBC Casts, UA 05/05/2023 NONE SEEN  < OR = 5 /HPF Final    RBC Casts, UA 05/05/2023 NONE SEEN  < OR = 2 /HPF Final    Squam Epithel, UA 05/05/2023 NONE SEEN  < OR = 5 /HPF Final    Bacteria, UA 05/05/2023 NONE SEEN  NONE SEEN /HPF Final    Uric Acid Niru, UA 05/05/2023 MANY (A)  NONE OR FEW /HPF Final    Hyaline Casts, UA 05/05/2023 NONE SEEN  NONE SEEN /LPF Final    Service Cmt: 05/05/2023    Final    Reflexive Urine Culture 05/05/2023    Final    Creatinine, Urine 05/05/2023 118  20 - 275 mg/dL Final    Microalb, Ur 05/05/2023 0.9  See Note: mg/dL Final    Microalb/Creat Ratio 05/05/2023 8  <30 mcg/mg creat Final    Lithium Level 05/05/2023 <0.3 (L)  0.6 - 1.2 mmol/L Final   Lab Visit on 03/07/2023   Component Date Value Ref Range Status    Ferritin 03/07/2023 32  20.0 - 300.0 ng/mL Final    WBC 03/07/2023 7.70  3.90 - 12.70 K/uL Final    RBC 03/07/2023 4.22  4.00 - 5.40 M/uL Final    Hemoglobin 03/07/2023 12.6  12.0 - 16.0 g/dL Final    Hematocrit 03/07/2023 37.8  37.0 - 48.5 % Final    MCV 03/07/2023 90  82 - 98 fL Final    MCH 03/07/2023 29.9  27.0 - 31.0 pg Final    MCHC 03/07/2023 33.3  32.0 - 36.0 g/dL Final    RDW 03/07/2023 13.6  11.5 - 14.5 % Final    Platelets 03/07/2023 234  150 - 450 K/uL Final    MPV 03/07/2023 10.2  9.2 - 12.9 fL Final    Immature Granulocytes 03/07/2023 0.1  0.0 - 0.5 % Final    Gran # (ANC) 03/07/2023 5.2  1.8 - 7.7 K/uL Final    Immature Grans (Abs) 03/07/2023 0.01  0.00 - 0.04 K/uL Final    Lymph # 03/07/2023 1.6  1.0 - 4.8 K/uL Final    Mono # 03/07/2023 0.5  0.3 - 1.0 K/uL Final    Eos # 03/07/2023 0.3  0.0 - 0.5 K/uL Final    Baso # 03/07/2023 0.07  0.00 - 0.20 K/uL Final    nRBC  03/07/2023 0  0 /100 WBC Final    Gran % 03/07/2023 67.7  38.0 - 73.0 % Final    Lymph % 03/07/2023 20.5  18.0 - 48.0 % Final    Mono % 03/07/2023 7.0  4.0 - 15.0 % Final    Eosinophil % 03/07/2023 3.8  0.0 - 8.0 % Final    Basophil % 03/07/2023 0.9  0.0 - 1.9 % Final    Differential Method 03/07/2023 Automated   Final       Past Medical History:   Diagnosis Date    Glaucoma      Social History     Socioeconomic History    Marital status: Single   Tobacco Use    Smoking status: Never    Smokeless tobacco: Never   Substance and Sexual Activity    Alcohol use: No    Drug use: No     Past Surgical History:   Procedure Laterality Date    CATARACT EXTRACTION  2016    HYSTERECTOMY       Family History   Problem Relation Age of Onset    Cancer Mother        Review of patient's allergies indicates:   Allergen Reactions    Codeine     Sulfa (sulfonamide antibiotics)     Sulfur Other (See Comments)       Current Outpatient Medications:     ergocalciferol, vitamin D2, (VITAMIN D ORAL), Take 1 tablet by mouth once daily., Disp: , Rfl:     lisinopriL (PRINIVIL,ZESTRIL) 20 MG tablet, Take 1 tablet (20 mg total) by mouth once daily., Disp: 90 tablet, Rfl: 3    LUMIGAN 0.01 % Drop, Place 1 drop into both eyes every evening., Disp: , Rfl:     omeprazole (PRILOSEC) 20 MG capsule, Take 1 capsule (20 mg total) by mouth once daily., Disp: 90 capsule, Rfl: 3    rosuvastatin (CRESTOR) 10 MG tablet, Take 1 tablet (10 mg total) by mouth once daily., Disp: 90 tablet, Rfl: 3    diclofenac sodium (VOLTAREN) 1 % Gel, Apply 2 g topically once daily. for 10 days, Disp: 450 g, Rfl: 0    FLUoxetine 10 MG capsule, Take 1 capsule (10 mg total) by mouth once daily. (Patient not taking: Reported on 4/20/2023), Disp: 90 capsule, Rfl: 1    lithium carbonate 150 MG capsule, Take 1 capsule (150 mg total) by mouth once daily., Disp: 90 capsule, Rfl: 3    nitrofurantoin, macrocrystal-monohydrate, (MACROBID) 100 MG capsule, Take 1 capsule (100 mg total) by  "mouth 2 (two) times daily., Disp: 20 capsule, Rfl: 0    Review of Systems   Constitutional:  Negative for appetite change and fever.   Respiratory:  Negative for apnea, cough, chest tightness, shortness of breath and wheezing.    Cardiovascular:  Negative for chest pain, palpitations and leg swelling.   Gastrointestinal:  Negative for abdominal pain.   Genitourinary:  Positive for difficulty urinating, dysuria, frequency and urgency.   Musculoskeletal:  Negative for back pain.   Neurological:  Negative for dizziness, weakness, numbness and headaches.         Objective:      Vitals:    06/23/23 1006   BP: 138/70   Pulse: 78   Weight: 68.5 kg (151 lb)   Height: 5' 3" (1.6 m)     Physical Exam  Constitutional:       General: She is not in acute distress.     Appearance: Normal appearance. She is not ill-appearing.   HENT:      Head: Normocephalic and atraumatic.      Nose: Nose normal.      Mouth/Throat:      Mouth: Mucous membranes are moist.   Eyes:      General:         Right eye: No discharge.         Left eye: No discharge.   Cardiovascular:      Rate and Rhythm: Normal rate and regular rhythm.      Pulses: Normal pulses.      Heart sounds: Normal heart sounds. No murmur heard.  Pulmonary:      Effort: Pulmonary effort is normal.      Breath sounds: Normal breath sounds. No wheezing or rales.   Abdominal:      General: Abdomen is flat. Bowel sounds are normal. There is no distension.      Tenderness: There is no abdominal tenderness. There is no right CVA tenderness or left CVA tenderness.   Musculoskeletal:      Right lower leg: No edema.      Left lower leg: No edema.   Skin:     General: Skin is warm and dry.      Capillary Refill: Capillary refill takes less than 2 seconds.   Neurological:      General: No focal deficit present.      Mental Status: She is alert.   Psychiatric:         Mood and Affect: Mood normal.         Assessment:       1. Acute cystitis with hematuria    2. UTI symptoms    3. Purpura " senilis         Plan:       Acute cystitis with hematuria    Advised patient to drink lots of water. Can take otc cranberry supplement. Can take otc tylenol for discomfort. May want to take a probiotic or eat some yogurt to avoid any stomach upset with antibiotic. Instructed patient to notify clinic if she does not feel better in a few days. Will call with culture results.-     -     nitrofurantoin, macrocrystal-monohydrate, (MACROBID) 100 MG capsule; Take 1 capsule (100 mg total) by mouth 2 (two) times daily.  Dispense: 20 capsule; Refill: 0    UTI symptoms  -     POCT Urinalysis, Dipstick, Automated, W/O Scope  -     Urine culture; Future; Expected date: 06/23/2023      Follow up as needed or routinely.        6/23/2023 Dotty Saunders

## 2023-06-25 ENCOUNTER — TELEPHONE (OUTPATIENT)
Dept: HEMATOLOGY/ONCOLOGY | Facility: HOSPITAL | Age: 78
End: 2023-06-25

## 2023-06-25 LAB — BACTERIA UR CULT: ABNORMAL

## 2023-06-26 ENCOUNTER — TELEPHONE (OUTPATIENT)
Dept: FAMILY MEDICINE | Facility: CLINIC | Age: 78
End: 2023-06-26

## 2023-06-26 NOTE — PROGRESS NOTES
Please let Mrs. Gutierrez know that her urine culture did show E. Coli causing her infection and the antibiotics she is taking should work well to clear it up. Let us know not.   Thank you !

## 2023-06-26 NOTE — TELEPHONE ENCOUNTER
----- Message from ROXANNA Grant sent at 6/26/2023  8:40 AM CDT -----  Please let Mrs. Gutierrez know that her urine culture did show E. Coli causing her infection and the antibiotics she is taking should work well to clear it up. Let us know not.   Thank you !

## 2023-06-27 ENCOUNTER — TELEPHONE (OUTPATIENT)
Dept: FAMILY MEDICINE | Facility: CLINIC | Age: 78
End: 2023-06-27

## 2023-06-27 NOTE — TELEPHONE ENCOUNTER
Spoke with patient informed of urine culture results. Patient verbalized understanding. Patient had received message yesterday and will continue medication until finished and will call back if UTI has not resolved. Patient states she is feeling better.

## 2023-06-27 NOTE — TELEPHONE ENCOUNTER
----- Message from Max Landeros MD sent at 6/27/2023  9:27 AM CDT -----  Call patient.  Her urine culture is growing out E coli, the antibiotic should cure that.

## 2023-06-29 ENCOUNTER — LAB VISIT (OUTPATIENT)
Dept: LAB | Facility: HOSPITAL | Age: 78
End: 2023-06-29
Attending: INTERNAL MEDICINE
Payer: COMMERCIAL

## 2023-06-29 DIAGNOSIS — E55.9 AVITAMINOSIS D: ICD-10-CM

## 2023-06-29 DIAGNOSIS — N18.30 CHRONIC KIDNEY DISEASE, STAGE III (MODERATE): Primary | ICD-10-CM

## 2023-06-29 LAB
25(OH)D3+25(OH)D2 SERPL-MCNC: 65 NG/ML (ref 30–96)
ALBUMIN SERPL BCP-MCNC: 4.3 G/DL (ref 3.5–5.2)
ANION GAP SERPL CALC-SCNC: 8 MMOL/L (ref 8–16)
BACTERIA #/AREA URNS HPF: NEGATIVE /HPF
BUN SERPL-MCNC: 22 MG/DL (ref 8–23)
CALCIUM SERPL-MCNC: 9.7 MG/DL (ref 8.7–10.5)
CHLORIDE SERPL-SCNC: 112 MMOL/L (ref 95–110)
CO2 SERPL-SCNC: 20 MMOL/L (ref 23–29)
CREAT SERPL-MCNC: 1.2 MG/DL (ref 0.5–1.4)
CREAT UR-MCNC: 150 MG/DL (ref 15–325)
EST. GFR  (NO RACE VARIABLE): 46 ML/MIN/1.73 M^2
GLUCOSE SERPL-MCNC: 102 MG/DL (ref 70–110)
HYALINE CASTS #/AREA URNS LPF: 2 /LPF
MICROSCOPIC COMMENT: ABNORMAL
PHOSPHATE SERPL-MCNC: 3.5 MG/DL (ref 2.7–4.5)
POTASSIUM SERPL-SCNC: 3.8 MMOL/L (ref 3.5–5.1)
PROT UR-MCNC: 11 MG/DL (ref 6–15)
PROT/CREAT UR: 0.07 MG/G{CREAT} (ref 0–0.2)
RBC #/AREA URNS HPF: 1 /HPF (ref 0–4)
SODIUM SERPL-SCNC: 140 MMOL/L (ref 136–145)
SQUAMOUS #/AREA URNS HPF: 1 /HPF
WBC #/AREA URNS HPF: 5 /HPF (ref 0–5)

## 2023-06-29 PROCEDURE — 81001 URINALYSIS AUTO W/SCOPE: CPT | Performed by: INTERNAL MEDICINE

## 2023-06-29 PROCEDURE — 80069 RENAL FUNCTION PANEL: CPT | Performed by: INTERNAL MEDICINE

## 2023-06-29 PROCEDURE — 82570 ASSAY OF URINE CREATININE: CPT | Performed by: INTERNAL MEDICINE

## 2023-06-29 PROCEDURE — 36415 COLL VENOUS BLD VENIPUNCTURE: CPT | Performed by: INTERNAL MEDICINE

## 2023-06-29 PROCEDURE — 82306 VITAMIN D 25 HYDROXY: CPT | Performed by: INTERNAL MEDICINE

## 2023-07-10 ENCOUNTER — LAB VISIT (OUTPATIENT)
Dept: LAB | Facility: HOSPITAL | Age: 78
End: 2023-07-10
Attending: INTERNAL MEDICINE
Payer: COMMERCIAL

## 2023-07-10 DIAGNOSIS — N18.30 CHRONIC KIDNEY DISEASE, STAGE III (MODERATE): Primary | ICD-10-CM

## 2023-07-10 LAB
BACTERIA #/AREA URNS HPF: NEGATIVE /HPF
BILIRUB UR QL STRIP: NEGATIVE
CLARITY UR: CLEAR
COLOR UR: YELLOW
GLUCOSE UR QL STRIP: NEGATIVE
HGB UR QL STRIP: ABNORMAL
HYALINE CASTS #/AREA URNS LPF: 4 /LPF
KETONES UR QL STRIP: NEGATIVE
LEUKOCYTE ESTERASE UR QL STRIP: NEGATIVE
MICROSCOPIC COMMENT: ABNORMAL
NITRITE UR QL STRIP: NEGATIVE
PH UR STRIP: 5 [PH] (ref 5–8)
PROT UR QL STRIP: ABNORMAL
RBC #/AREA URNS HPF: 2 /HPF (ref 0–4)
SP GR UR STRIP: 1.02 (ref 1–1.03)
SQUAMOUS #/AREA URNS HPF: 1 /HPF
URATE SERPL-MCNC: 6.7 MG/DL (ref 2.4–5.7)
URN SPEC COLLECT METH UR: ABNORMAL
UROBILINOGEN UR STRIP-ACNC: NEGATIVE EU/DL
WBC #/AREA URNS HPF: 3 /HPF (ref 0–5)

## 2023-07-10 PROCEDURE — 81001 URINALYSIS AUTO W/SCOPE: CPT | Performed by: INTERNAL MEDICINE

## 2023-07-10 PROCEDURE — 84550 ASSAY OF BLOOD/URIC ACID: CPT | Performed by: INTERNAL MEDICINE

## 2023-07-10 PROCEDURE — 36415 COLL VENOUS BLD VENIPUNCTURE: CPT | Performed by: INTERNAL MEDICINE

## 2023-08-30 DIAGNOSIS — I10 ESSENTIAL HYPERTENSION: ICD-10-CM

## 2023-08-30 RX ORDER — BENZONATATE 100 MG/1
100 CAPSULE ORAL 3 TIMES DAILY PRN
Qty: 30 CAPSULE | Refills: 0 | Status: SHIPPED | OUTPATIENT
Start: 2023-08-30 | End: 2023-09-09

## 2023-08-30 RX ORDER — LOSARTAN POTASSIUM 50 MG/1
50 TABLET ORAL DAILY
Qty: 30 TABLET | Refills: 1 | Status: SHIPPED | OUTPATIENT
Start: 2023-08-30 | End: 2023-11-16

## 2023-08-30 NOTE — TELEPHONE ENCOUNTER
Spoke with patient who states she has a dx for GERD. She saw an ENT 2 weeks ago and went down with a camera and told her that's what she was having. She had stopped her medication and they told her to restart the Omeprazole 20mg daily. She states she just continuously coughs and she wants to know if there is any other medication she can take that will help this.    Printed

## 2023-08-30 NOTE — TELEPHONE ENCOUNTER
"Per Dr. Landeros "add tessalon perles 100mg TID prn cough #30. Lisinopril 20mg can cause a dry cough. Lets stop Lisinopril and try Losartan 50mg po daily #30 1 refill. The cough should resolve in 2 weeks."     Patient agrees.   "

## 2023-08-30 NOTE — TELEPHONE ENCOUNTER
----- Message from Beab Ordaz sent at 8/30/2023  8:37 AM CDT -----  Pt needs to be seen for Gerd, she has a constant cough [phone]

## 2023-09-05 ENCOUNTER — LAB VISIT (OUTPATIENT)
Dept: LAB | Facility: HOSPITAL | Age: 78
End: 2023-09-05
Attending: INTERNAL MEDICINE
Payer: COMMERCIAL

## 2023-09-05 DIAGNOSIS — E83.118 OTHER HEMOCHROMATOSIS: ICD-10-CM

## 2023-09-05 LAB
BASOPHILS # BLD AUTO: 0.08 K/UL (ref 0–0.2)
BASOPHILS NFR BLD: 1.3 % (ref 0–1.9)
DIFFERENTIAL METHOD: NORMAL
EOSINOPHIL # BLD AUTO: 0.4 K/UL (ref 0–0.5)
EOSINOPHIL NFR BLD: 6 % (ref 0–8)
ERYTHROCYTE [DISTWIDTH] IN BLOOD BY AUTOMATED COUNT: 13.4 % (ref 11.5–14.5)
FERRITIN SERPL-MCNC: 87 NG/ML (ref 20–300)
HCT VFR BLD AUTO: 39.5 % (ref 37–48.5)
HGB BLD-MCNC: 13.2 G/DL (ref 12–16)
IMM GRANULOCYTES # BLD AUTO: 0.02 K/UL (ref 0–0.04)
IMM GRANULOCYTES NFR BLD AUTO: 0.3 % (ref 0–0.5)
LYMPHOCYTES # BLD AUTO: 1.3 K/UL (ref 1–4.8)
LYMPHOCYTES NFR BLD: 20.5 % (ref 18–48)
MCH RBC QN AUTO: 30.8 PG (ref 27–31)
MCHC RBC AUTO-ENTMCNC: 33.4 G/DL (ref 32–36)
MCV RBC AUTO: 92 FL (ref 82–98)
MONOCYTES # BLD AUTO: 0.5 K/UL (ref 0.3–1)
MONOCYTES NFR BLD: 7.9 % (ref 4–15)
NEUTROPHILS # BLD AUTO: 4.1 K/UL (ref 1.8–7.7)
NEUTROPHILS NFR BLD: 64 % (ref 38–73)
NRBC BLD-RTO: 0 /100 WBC
PLATELET # BLD AUTO: 255 K/UL (ref 150–450)
PMV BLD AUTO: 10 FL (ref 9.2–12.9)
RBC # BLD AUTO: 4.28 M/UL (ref 4–5.4)
WBC # BLD AUTO: 6.33 K/UL (ref 3.9–12.7)

## 2023-09-05 PROCEDURE — 36415 COLL VENOUS BLD VENIPUNCTURE: CPT | Performed by: INTERNAL MEDICINE

## 2023-09-05 PROCEDURE — 82728 ASSAY OF FERRITIN: CPT | Performed by: INTERNAL MEDICINE

## 2023-09-05 PROCEDURE — 85025 COMPLETE CBC W/AUTO DIFF WBC: CPT | Performed by: INTERNAL MEDICINE

## 2023-09-11 ENCOUNTER — OFFICE VISIT (OUTPATIENT)
Dept: HEMATOLOGY/ONCOLOGY | Facility: CLINIC | Age: 78
End: 2023-09-11
Payer: COMMERCIAL

## 2023-09-11 VITALS
HEART RATE: 71 BPM | WEIGHT: 142.88 LBS | SYSTOLIC BLOOD PRESSURE: 193 MMHG | DIASTOLIC BLOOD PRESSURE: 68 MMHG | RESPIRATION RATE: 16 BRPM | BODY MASS INDEX: 25.32 KG/M2 | HEIGHT: 63 IN | TEMPERATURE: 98 F

## 2023-09-11 DIAGNOSIS — E83.118 OTHER HEMOCHROMATOSIS: Primary | ICD-10-CM

## 2023-09-11 PROCEDURE — 1100F PR PT FALLS ASSESS DOC 2+ FALLS/FALL W/INJURY/YR: ICD-10-PCS | Mod: CPTII,S$GLB,, | Performed by: INTERNAL MEDICINE

## 2023-09-11 PROCEDURE — 1126F PR PAIN SEVERITY QUANTIFIED, NO PAIN PRESENT: ICD-10-PCS | Mod: CPTII,S$GLB,, | Performed by: INTERNAL MEDICINE

## 2023-09-11 PROCEDURE — 3078F DIAST BP <80 MM HG: CPT | Mod: CPTII,S$GLB,, | Performed by: INTERNAL MEDICINE

## 2023-09-11 PROCEDURE — 1126F AMNT PAIN NOTED NONE PRSNT: CPT | Mod: CPTII,S$GLB,, | Performed by: INTERNAL MEDICINE

## 2023-09-11 PROCEDURE — 3288F PR FALLS RISK ASSESSMENT DOCUMENTED: ICD-10-PCS | Mod: CPTII,S$GLB,, | Performed by: INTERNAL MEDICINE

## 2023-09-11 PROCEDURE — 1100F PTFALLS ASSESS-DOCD GE2>/YR: CPT | Mod: CPTII,S$GLB,, | Performed by: INTERNAL MEDICINE

## 2023-09-11 PROCEDURE — 1159F PR MEDICATION LIST DOCUMENTED IN MEDICAL RECORD: ICD-10-PCS | Mod: CPTII,S$GLB,, | Performed by: INTERNAL MEDICINE

## 2023-09-11 PROCEDURE — 99214 OFFICE O/P EST MOD 30 MIN: CPT | Mod: S$GLB,,, | Performed by: INTERNAL MEDICINE

## 2023-09-11 PROCEDURE — 3288F FALL RISK ASSESSMENT DOCD: CPT | Mod: CPTII,S$GLB,, | Performed by: INTERNAL MEDICINE

## 2023-09-11 PROCEDURE — 3077F SYST BP >= 140 MM HG: CPT | Mod: CPTII,S$GLB,, | Performed by: INTERNAL MEDICINE

## 2023-09-11 PROCEDURE — 3078F PR MOST RECENT DIASTOLIC BLOOD PRESSURE < 80 MM HG: ICD-10-PCS | Mod: CPTII,S$GLB,, | Performed by: INTERNAL MEDICINE

## 2023-09-11 PROCEDURE — 3077F PR MOST RECENT SYSTOLIC BLOOD PRESSURE >= 140 MM HG: ICD-10-PCS | Mod: CPTII,S$GLB,, | Performed by: INTERNAL MEDICINE

## 2023-09-11 PROCEDURE — 1159F MED LIST DOCD IN RCRD: CPT | Mod: CPTII,S$GLB,, | Performed by: INTERNAL MEDICINE

## 2023-09-11 PROCEDURE — 99214 PR OFFICE/OUTPT VISIT, EST, LEVL IV, 30-39 MIN: ICD-10-PCS | Mod: S$GLB,,, | Performed by: INTERNAL MEDICINE

## 2023-09-11 NOTE — LETTER
September 12, 2023        Max Landeros MD  [address]  [address]  [facility]  [place] [address]             [facility] Hematology Oncology  [address]  [place] [address]  Phone: [phone]  Fax: [phone]   Patient: Payton Gutierrez   MR Number: [id number]   YOB: 1945   Date of Visit: 9/11/2023       Dear Dr. Landeros:    Thank you for referring Payton Gutierrez to me for evaluation. Below are the relevant portions of my assessment and plan of care.            If you have questions, please do not hesitate to call me. I look forward to following Payton along with you.    Sincerely,      BRANDON Nava MD           CC    No Recipients

## 2023-09-13 NOTE — PROGRESS NOTES
[facility] hematology oncology in office follow-up encounter progress note    23    Subjective:      Patient ID:   Payton Gutierrez  77 y.o. female  1945  [place]      Chief Complaint:   Iron disorder follow up    HPI:  77 y.o. female with diagnosis of increased iron stores, hemochromatosis treated with intermittent phlebotomy in the past.  Phlebotomy is on hold. Her last therapeutic phlebotomy   was 5 years  Ago.    Ferritin went from 132 to 206.  Hgb 13.6.  Resumed phlebotomy schedule, orders written.    Ferritin came down to  32, and has gradually increased to 87 over 6 months.  Hgb 13.2, observe for now.  Plan to resume phlebotomy at 200.    She has not had covid 19 infection.  She did take 2/2 vaccines.  She is undecided if she will take the booster vaccine.    Had recent episode of diverticulitis, being evaluated for surgery is sx recur.  R hip pain sx, DDD.    She has history of GERD, glaucoma, hypercholesterolemia,hypertension, and has been diagnosed with Parkinson's disease.  She sees Dr. Spencer.  He tells me her Parkinson's symptoms have resolved, and she is off Parkinson medications.    She is status post tonsillectomy, appendectomy, complete hysterectomy.    She is a retired , she is allergic to codeine. She does not smoke, she does not drink alcohol with regularity.    Her dad passed away of a motor vehicle accident and her mother  of vaginal cancer. She has a brother with the hemochromatosis gene and increased ferritin on intermittent phlebotomy.    She denies gallbladder disease jaundice or hepatitis.  She is on lithium.  She is seeing Dr. Watts for renal dx.  She recently had diverticulitis.    2nd cousin Adry STARKS.    ROS:   GEN: normal without any fever, night sweats or weight loss  HEENT: normal with no HA's, sore throat, stiff neck, changes in vision  CV: normal with no CP, SOB, PND, SAUNDERS or orthopnea  PULM: normal with no SOB, cough, hemoptysis, sputum or pleuritic  "pain  GI: normal with no abdominal pain, nausea, vomiting, constipation, diarrhea, melanotic stools, BRBPR, or hematemesis  : normal with no hematuria, dysuria  BREAST: normal with no mass, discharge, pain  SKIN: normal with no rash, erythema, bruising, or swelling     Past Medical History:   Diagnosis Date    Glaucoma      Past Surgical History:   Procedure Laterality Date    CATARACT EXTRACTION  2016    HYSTERECTOMY         Review of patient's allergies indicates:   Allergen Reactions    Codeine     Sulfa (sulfonamide antibiotics)            Current Outpatient Medications:     ergocalciferol, vitamin D2, (VITAMIN D ORAL), Take 1 tablet by mouth once daily., Disp: , Rfl:     FLUoxetine 10 MG capsule, Take 1 capsule (10 mg total) by mouth once daily., Disp: 90 capsule, Rfl: 1    losartan (COZAAR) 50 MG tablet, Take 1 tablet (50 mg total) by mouth once daily., Disp: 30 tablet, Rfl: 1    LUMIGAN 0.01 % Drop, Place 1 drop into both eyes every evening., Disp: , Rfl:     nitrofurantoin, macrocrystal-monohydrate, (MACROBID) 100 MG capsule, Take 1 capsule (100 mg total) by mouth 2 (two) times daily., Disp: 20 capsule, Rfl: 0    omeprazole (PRILOSEC) 20 MG capsule, Take 1 capsule (20 mg total) by mouth once daily., Disp: 90 capsule, Rfl: 3    rosuvastatin (CRESTOR) 10 MG tablet, Take 1 tablet (10 mg total) by mouth once daily., Disp: 90 tablet, Rfl: 3    diclofenac sodium (VOLTAREN) 1 % Gel, Apply 2 g topically once daily. for 10 days, Disp: 450 g, Rfl: 0    lithium carbonate 150 MG capsule, Take 1 capsule (150 mg total) by mouth once daily., Disp: 90 capsule, Rfl: 3          Objective:   Vitals:  Blood pressure (!) 193/68, pulse 71, temperature 97.7 °F (36.5 °C), resp. rate 16, height 5' 3" (1.6 m), weight 64.8 kg (142 lb 14.4 oz).    Physical Examination:   GEN: no apparent distress, comfortable  HEAD: atraumatic and normocephalic  EYES: no pallor, no icterus  ENT: OMM, no pharyngeal erythema, external ears WNL; no " nasal discharge; no thrush  NECK: no masses, thyroid normal, trachea midline, no LAD/LN's, supple  CV: RRR with no murmur; normal pulse; normal S1 and S2; no pedal edema  CHEST: Normal respiratory effort; CTAB; normal breath sounds; no wheeze or crackles  ABDOM: nontender and nondistended; soft;  no rebound/guarding, the liver and spleen are not palpable  MUSC/Skeletal: ROM normal; no crepitus; joints normal  EXTREM: no clubbing, cyanosis, inflammation or swelling  SKIN: no rashes, lesions, ulcers, petechia  : no cvat  NEURO: grossly intact; motor/sensory WNL;  no tremors  PSYCH: normal mood, affect and behavior  LYMPH: normal cervical, supraclavicular, axillary and groin LN's  BREASTS:she left the bra on, I did not examine the breasts      Dr. Jain did colonoscopy on August 17, 2017, diverticular disease were found, biopsy was negative for colitis.  Hgb 13, ferritin 87 now.    Assessment:   (1) 77 y.o. female with diagnosis of increased iron stores, or hemochromatosis. She is status post intermittent phlebotomy in the past. Ferritin level is now 87.  Observe for now.  Plan to resume phlebotomy at ferritin of 200.    (2)other history includes hypertension, hypercholesterolemia, she will follow-up with Dr. Landeros for primary care.     (3)She has recently been diagnosed with Parkinson's disease and will follow-up with Dr. Spencer for that condition and Dr. Chapa for renal.    Return to clinic here in 6 months with CBC and ferritin.  [facility].

## 2023-09-20 DIAGNOSIS — Z78.0 ASYMPTOMATIC MENOPAUSAL STATE: ICD-10-CM

## 2023-11-02 ENCOUNTER — TELEPHONE (OUTPATIENT)
Dept: FAMILY MEDICINE | Facility: CLINIC | Age: 78
End: 2023-11-02

## 2023-11-02 NOTE — TELEPHONE ENCOUNTER
Spoke to patient that fasting lab and urine is due a week prior to visit, orders at [facility], encouraged water. Advised she can take morning meds that do not need to be taken with food.

## 2023-11-07 LAB
ALBUMIN SERPL-MCNC: 4.3 G/DL (ref 3.6–5.1)
ALBUMIN/GLOB SERPL: 1.8 (CALC) (ref 1–2.5)
ALP SERPL-CCNC: 120 U/L (ref 37–153)
ALT SERPL-CCNC: 8 U/L (ref 6–29)
AST SERPL-CCNC: 11 U/L (ref 10–35)
BILIRUB SERPL-MCNC: 0.7 MG/DL (ref 0.2–1.2)
BUN SERPL-MCNC: 22 MG/DL (ref 7–25)
BUN/CREAT SERPL: 19 (CALC) (ref 6–22)
CALCIUM SERPL-MCNC: 9.7 MG/DL (ref 8.6–10.4)
CHLORIDE SERPL-SCNC: 105 MMOL/L (ref 98–110)
CHOLEST SERPL-MCNC: 171 MG/DL
CHOLEST/HDLC SERPL: 2.7 (CALC)
CO2 SERPL-SCNC: 27 MMOL/L (ref 20–32)
CREAT SERPL-MCNC: 1.14 MG/DL (ref 0.6–0.95)
EGFR: 49 ML/MIN/1.73M2
GLOBULIN SER CALC-MCNC: 2.4 G/DL (CALC) (ref 1.9–3.7)
GLUCOSE SERPL-MCNC: 96 MG/DL (ref 65–99)
HDLC SERPL-MCNC: 63 MG/DL
LDLC SERPL CALC-MCNC: 89 MG/DL (CALC)
NONHDLC SERPL-MCNC: 108 MG/DL (CALC)
POTASSIUM SERPL-SCNC: 4.2 MMOL/L (ref 3.5–5.3)
PROT SERPL-MCNC: 6.7 G/DL (ref 6.1–8.1)
SODIUM SERPL-SCNC: 142 MMOL/L (ref 135–146)
TRIGL SERPL-MCNC: 94 MG/DL

## 2023-11-16 ENCOUNTER — OFFICE VISIT (OUTPATIENT)
Dept: FAMILY MEDICINE | Facility: CLINIC | Age: 78
End: 2023-11-16
Attending: FAMILY MEDICINE
Payer: COMMERCIAL

## 2023-11-16 VITALS
HEIGHT: 63 IN | BODY MASS INDEX: 25.69 KG/M2 | WEIGHT: 145 LBS | DIASTOLIC BLOOD PRESSURE: 80 MMHG | SYSTOLIC BLOOD PRESSURE: 148 MMHG | HEART RATE: 78 BPM

## 2023-11-16 DIAGNOSIS — H90.0 CONDUCTIVE HEARING LOSS, BILATERAL: ICD-10-CM

## 2023-11-16 DIAGNOSIS — K21.9 GASTROESOPHAGEAL REFLUX DISEASE WITHOUT ESOPHAGITIS: ICD-10-CM

## 2023-11-16 DIAGNOSIS — E83.118 OTHER HEMOCHROMATOSIS: ICD-10-CM

## 2023-11-16 DIAGNOSIS — G20.A1 PARKINSON'S DISEASE WITHOUT DYSKINESIA OR FLUCTUATING MANIFESTATIONS: ICD-10-CM

## 2023-11-16 DIAGNOSIS — D69.2 PURPURA SENILIS: ICD-10-CM

## 2023-11-16 DIAGNOSIS — F31.76 BIPOLAR DISORDER, IN FULL REMISSION, MOST RECENT EPISODE DEPRESSED: ICD-10-CM

## 2023-11-16 DIAGNOSIS — Z23 NEED FOR VACCINATION: ICD-10-CM

## 2023-11-16 DIAGNOSIS — N18.31 STAGE 3A CHRONIC KIDNEY DISEASE: ICD-10-CM

## 2023-11-16 DIAGNOSIS — M51.9 LUMBAR DISC DISEASE: ICD-10-CM

## 2023-11-16 DIAGNOSIS — E78.00 HIGH CHOLESTEROL: ICD-10-CM

## 2023-11-16 DIAGNOSIS — Z78.0 MENOPAUSE: ICD-10-CM

## 2023-11-16 DIAGNOSIS — I10 ESSENTIAL HYPERTENSION: Primary | ICD-10-CM

## 2023-11-16 PROCEDURE — 1101F PT FALLS ASSESS-DOCD LE1/YR: CPT | Mod: CPTII,S$GLB,, | Performed by: FAMILY MEDICINE

## 2023-11-16 PROCEDURE — 3288F FALL RISK ASSESSMENT DOCD: CPT | Mod: CPTII,S$GLB,, | Performed by: FAMILY MEDICINE

## 2023-11-16 PROCEDURE — 90694 VACC AIIV4 NO PRSRV 0.5ML IM: CPT | Mod: S$GLB,,, | Performed by: FAMILY MEDICINE

## 2023-11-16 PROCEDURE — 1159F PR MEDICATION LIST DOCUMENTED IN MEDICAL RECORD: ICD-10-PCS | Mod: CPTII,S$GLB,, | Performed by: FAMILY MEDICINE

## 2023-11-16 PROCEDURE — 3079F DIAST BP 80-89 MM HG: CPT | Mod: CPTII,S$GLB,, | Performed by: FAMILY MEDICINE

## 2023-11-16 PROCEDURE — 1159F MED LIST DOCD IN RCRD: CPT | Mod: CPTII,S$GLB,, | Performed by: FAMILY MEDICINE

## 2023-11-16 PROCEDURE — 3079F PR MOST RECENT DIASTOLIC BLOOD PRESSURE 80-89 MM HG: ICD-10-PCS | Mod: CPTII,S$GLB,, | Performed by: FAMILY MEDICINE

## 2023-11-16 PROCEDURE — 3077F PR MOST RECENT SYSTOLIC BLOOD PRESSURE >= 140 MM HG: ICD-10-PCS | Mod: CPTII,S$GLB,, | Performed by: FAMILY MEDICINE

## 2023-11-16 PROCEDURE — 90471 IMMUNIZATION ADMIN: CPT | Mod: S$GLB,,, | Performed by: FAMILY MEDICINE

## 2023-11-16 PROCEDURE — 1101F PR PT FALLS ASSESS DOC 0-1 FALLS W/OUT INJ PAST YR: ICD-10-PCS | Mod: CPTII,S$GLB,, | Performed by: FAMILY MEDICINE

## 2023-11-16 PROCEDURE — 3077F SYST BP >= 140 MM HG: CPT | Mod: CPTII,S$GLB,, | Performed by: FAMILY MEDICINE

## 2023-11-16 PROCEDURE — 3288F PR FALLS RISK ASSESSMENT DOCUMENTED: ICD-10-PCS | Mod: CPTII,S$GLB,, | Performed by: FAMILY MEDICINE

## 2023-11-16 PROCEDURE — 90471 FLU VACCINE - QUADRIVALENT - ADJUVANTED: ICD-10-PCS | Mod: S$GLB,,, | Performed by: FAMILY MEDICINE

## 2023-11-16 PROCEDURE — 99214 OFFICE O/P EST MOD 30 MIN: CPT | Mod: 25,S$GLB,, | Performed by: FAMILY MEDICINE

## 2023-11-16 PROCEDURE — 99214 PR OFFICE/OUTPT VISIT, EST, LEVL IV, 30-39 MIN: ICD-10-PCS | Mod: 25,S$GLB,, | Performed by: FAMILY MEDICINE

## 2023-11-16 PROCEDURE — 90694 FLU VACCINE - QUADRIVALENT - ADJUVANTED: ICD-10-PCS | Mod: S$GLB,,, | Performed by: FAMILY MEDICINE

## 2023-11-16 NOTE — PROGRESS NOTES
SUBJECTIVE:    Patient ID: Payton Gutierrez is a 78 y.o. female.    Chief Complaint: Hypertension (Brought bottles, flu vaccine ordered, declined dexa, no complaints, review Lab-results, abc )    This 78-year-old female recently moved to a new home this summer.  In her  neighborhood she will walk 30 minutes 4 times a week.  No Chest pain or joint pain with ambulation.    Lumbar disc disease, osteoarthritis, her hips ache in the morning but improved with activity.  Not on NSAIDs at this time.    GERD-flared up when she quit taking her omeprazole.  She had hoarseness and a cough.  That resolves when she reinstated the omeprazole.    Constipation-managed well with Metamucil gummies, takes acidophilus 1 billion as well as cranberry tablets on a daily basis.    She was diagnosed with mild Parkinson's disease but is not been taking any medication for this, was diagnosed by Dr. Kirk Nava-hemochromatosis-gets phlebotomy if her ferritin levels are over 200.    Lab work done every 6 months.  White coat hypertension-lisinopril 20 mg daily    Bipolar disorder, she reduced her lithium down to 150 mg HS.    CKD 3-followed by Dr. Horton today's GFR 46 and relatively stable.    Hyperlipidemia, stable on rosuvastatin          Lab Visit on 09/05/2023   Component Date Value Ref Range Status    Ferritin 09/05/2023 87  20.0 - 300.0 ng/mL Final    WBC 09/05/2023 6.33  3.90 - 12.70 K/uL Final    RBC 09/05/2023 4.28  4.00 - 5.40 M/uL Final    Hemoglobin 09/05/2023 13.2  12.0 - 16.0 g/dL Final    Hematocrit 09/05/2023 39.5  37.0 - 48.5 % Final    MCV 09/05/2023 92  82 - 98 fL Final    MCH 09/05/2023 30.8  27.0 - 31.0 pg Final    MCHC 09/05/2023 33.4  32.0 - 36.0 g/dL Final    RDW 09/05/2023 13.4  11.5 - 14.5 % Final    Platelets 09/05/2023 255  150 - 450 K/uL Final    MPV 09/05/2023 10.0  9.2 - 12.9 fL Final    Immature Granulocytes 09/05/2023 0.3  0.0 - 0.5 % Final    Gran # (ANC) 09/05/2023 4.1  1.8 - 7.7 K/uL Final     Immature Grans (Abs) 09/05/2023 0.02  0.00 - 0.04 K/uL Final    Lymph # 09/05/2023 1.3  1.0 - 4.8 K/uL Final    Mono # 09/05/2023 0.5  0.3 - 1.0 K/uL Final    Eos # 09/05/2023 0.4  0.0 - 0.5 K/uL Final    Baso # 09/05/2023 0.08  0.00 - 0.20 K/uL Final    nRBC 09/05/2023 0  0 /100 WBC Final    Gran % 09/05/2023 64.0  38.0 - 73.0 % Final    Lymph % 09/05/2023 20.5  18.0 - 48.0 % Final    Mono % 09/05/2023 7.9  4.0 - 15.0 % Final    Eosinophil % 09/05/2023 6.0  0.0 - 8.0 % Final    Basophil % 09/05/2023 1.3  0.0 - 1.9 % Final    Differential Method 09/05/2023 Automated   Final   Lab Visit on 07/10/2023   Component Date Value Ref Range Status    Uric Acid 07/10/2023 6.7 (H)  2.4 - 5.7 mg/dL Final    Specimen UA 07/10/2023 Urine, Unspecified   Final    Color, UA 07/10/2023 Yellow  Yellow, Straw, Otilia Final    Appearance, UA 07/10/2023 Clear  Clear Final    pH, UA 07/10/2023 5.0  5.0 - 8.0 Final    Specific Gravity, UA 07/10/2023 1.020  1.005 - 1.030 Final    Protein, UA 07/10/2023 Trace (A)  Negative Final    Glucose, UA 07/10/2023 Negative  Negative Final    Ketones, UA 07/10/2023 Negative  Negative Final    Bilirubin (UA) 07/10/2023 Negative  Negative Final    Occult Blood UA 07/10/2023 1+ (A)  Negative Final    Nitrite, UA 07/10/2023 Negative  Negative Final    Urobilinogen, UA 07/10/2023 Negative  Negative EU/dL Final    Leukocytes, UA 07/10/2023 Negative  Negative Final    RBC, UA 07/10/2023 2  0 - 4 /hpf Final    WBC, UA 07/10/2023 3  0 - 5 /hpf Final    Bacteria 07/10/2023 Negative  None-Occ /hpf Final    Squam Epithel, UA 07/10/2023 1  /hpf Final    Hyaline Casts, UA 07/10/2023 4 (A)  0-1/lpf /lpf Final    Microscopic Comment 07/10/2023 SEE COMMENT   Final   Lab Visit on 06/29/2023   Component Date Value Ref Range Status    Glucose 06/29/2023 102  70 - 110 mg/dL Final    Sodium 06/29/2023 140  136 - 145 mmol/L Final    Potassium 06/29/2023 3.8  3.5 - 5.1 mmol/L Final    Chloride 06/29/2023 112 (H)  95 - 110  mmol/L Final    CO2 06/29/2023 20 (L)  23 - 29 mmol/L Final    BUN 06/29/2023 22  8 - 23 mg/dL Final    Calcium 06/29/2023 9.7  8.7 - 10.5 mg/dL Final    Creatinine 06/29/2023 1.2  0.5 - 1.4 mg/dL Final    Albumin 06/29/2023 4.3  3.5 - 5.2 g/dL Final    Phosphorus 06/29/2023 3.5  2.7 - 4.5 mg/dL Final    eGFR 06/29/2023 46.0 (A)  >60 mL/min/1.73 m^2 Final    Anion Gap 06/29/2023 8  8 - 16 mmol/L Final    Vit D, 25-Hydroxy 06/29/2023 65  30 - 96 ng/mL Final    Protein, Urine Random 06/29/2023 11  6 - 15 mg/dL Final    Creatinine, Urine 06/29/2023 150.0  15.0 - 325.0 mg/dL Final    Prot/Creat Ratio, Urine 06/29/2023 0.07  0.00 - 0.20 Final    RBC, UA 06/29/2023 1  0 - 4 /hpf Final    WBC, UA 06/29/2023 5  0 - 5 /hpf Final    Bacteria 06/29/2023 Negative  None-Occ /hpf Final    Squam Epithel, UA 06/29/2023 1  /hpf Final    Hyaline Casts, UA 06/29/2023 2 (A)  0-1/lpf /lpf Final    Microscopic Comment 06/29/2023 SEE COMMENT   Final   Office Visit on 06/23/2023   Component Date Value Ref Range Status    POC Blood, Urine 06/23/2023 Positive (A)  Negative Final    POC Bilirubin, Urine 06/23/2023 Negative  Negative Final    POC Urobilinogen, Urine 06/23/2023 negative  0.1 - 1.1 Final    POC Ketones, Urine 06/23/2023 Negative  Negative Final    POC Protein, Urine 06/23/2023 Positive (A)  Negative Final    POC Nitrates, Urine 06/23/2023 Negative  Negative Final    POC Glucose, Urine 06/23/2023 Negative  Negative Final    pH, UA 06/23/2023 5.5   Final    POC Specific Gravity, Urine 06/23/2023 1.020  1.003 - 1.029 Final    POC Leukocytes, Urine 06/23/2023 Positive (A)  Negative Final    Urine Culture, Routine 06/23/2023  (A)   Final       Past Medical History:   Diagnosis Date    Glaucoma      Social History     Socioeconomic History    Marital status: Single   Tobacco Use    Smoking status: Never    Smokeless tobacco: Never   Substance and Sexual Activity    Alcohol use: No    Drug use: No     Past Surgical History:   Procedure  Laterality Date    CATARACT EXTRACTION  2016    HYSTERECTOMY       Family History   Problem Relation Age of Onset    Cancer Mother        The CVD Risk score (RAMAN'Edd, et al., 2008) failed to calculate for the following reasons:    The 2008 CVD risk score is only valid for ages 30 to 74    The patient has a prior MI, stroke, CHF, or peripheral vascular disease diagnosis    Tests to Keep You Healthy    Mammogram: Met on 1/12/2023  Last Blood Pressure <= 139/89 (11/16/2023): NO      Review of patient's allergies indicates:   Allergen Reactions    Codeine      Other reaction(s): Not available    Sulfa (sulfonamide antibiotics)      Other reaction(s): Not available    Sulfur Other (See Comments)       Current Outpatient Medications:     ergocalciferol, vitamin D2, (VITAMIN D ORAL), Take 1 tablet by mouth once daily., Disp: , Rfl:     lisinopriL (PRINIVIL,ZESTRIL) 20 MG tablet, Take 1 tablet by mouth once daily., Disp: , Rfl:     LUMIGAN 0.01 % Drop, Place 1 drop into both eyes every evening., Disp: , Rfl:     nitrofurantoin, macrocrystal-monohydrate, (MACROBID) 100 MG capsule, Take 1 capsule (100 mg total) by mouth 2 (two) times daily., Disp: 20 capsule, Rfl: 0    omeprazole (PRILOSEC) 20 MG capsule, Take 1 capsule (20 mg total) by mouth once daily., Disp: 90 capsule, Rfl: 3    rosuvastatin (CRESTOR) 10 MG tablet, Take 1 tablet (10 mg total) by mouth once daily., Disp: 90 tablet, Rfl: 3    diclofenac sodium (VOLTAREN) 1 % Gel, Apply 2 g topically once daily. for 10 days, Disp: 450 g, Rfl: 0    lithium carbonate 150 MG capsule, Take 1 capsule (150 mg total) by mouth once daily., Disp: 90 capsule, Rfl: 3    Review of Systems   Constitutional:  Negative for appetite change, chills, fatigue, fever and unexpected weight change.   HENT:  Negative for ear discharge and ear pain.    Eyes:  Negative for pain, discharge and visual disturbance.   Respiratory:  Negative for apnea, cough, shortness of breath and wheezing.   "  Cardiovascular:  Negative for chest pain, palpitations and leg swelling.   Gastrointestinal:  Negative for abdominal pain, blood in stool, constipation, diarrhea, nausea, vomiting and reflux.   Endocrine: Negative for cold intolerance, heat intolerance and polydipsia.   Genitourinary:  Negative for bladder incontinence, dysuria, hematuria, nocturia and urgency.   Musculoskeletal:  Positive for arthralgias (Hip and back ache) and back pain. Negative for gait problem, joint swelling and myalgias.   Neurological:  Negative for dizziness, seizures and numbness.   Psychiatric/Behavioral:  Negative for behavioral problems and hallucinations. The patient is not nervous/anxious.            Objective:      Vitals:    11/16/23 1030 11/16/23 1041 11/16/23 1110   BP: (!) 190/70 (!) 180/80 (!) 148/80   Pulse: 78     Weight: 65.8 kg (145 lb)     Height: 5' 3" (1.6 m)       Physical Exam  Vitals and nursing note reviewed.   Constitutional:       General: She is not in acute distress.     Appearance: Normal appearance. She is well-developed. She is not toxic-appearing.   HENT:      Head: Normocephalic and atraumatic.      Right Ear: Tympanic membrane and external ear normal.      Left Ear: Tympanic membrane and external ear normal.      Nose: Nose normal.      Mouth/Throat:      Pharynx: Oropharynx is clear.   Eyes:      Pupils: Pupils are equal, round, and reactive to light.   Neck:      Thyroid: No thyromegaly.      Vascular: No carotid bruit.   Cardiovascular:      Rate and Rhythm: Normal rate and regular rhythm.      Heart sounds: Normal heart sounds. No murmur heard.  Pulmonary:      Effort: Pulmonary effort is normal.      Breath sounds: Normal breath sounds. No wheezing or rales.   Abdominal:      General: Bowel sounds are normal. There is no distension.      Palpations: Abdomen is soft.      Tenderness: There is no abdominal tenderness.   Musculoskeletal:         General: No tenderness or deformity. Normal range of " motion.      Cervical back: Normal range of motion and neck supple.      Lumbar back: Normal. No spasms.      Comments: Bends 90 degrees at  waist shoulders have good range of motion knees are slightly crepitant.  No pitting edema to lower extremities   Lymphadenopathy:      Cervical: No cervical adenopathy.   Skin:     General: Skin is warm and dry.      Findings: No rash.   Neurological:      Mental Status: She is alert and oriented to person, place, and time. Mental status is at baseline.      Cranial Nerves: No cranial nerve deficit.      Coordination: Coordination normal.      Comments: Has mild cogwheeling to the right arm only.  Walks with a normal hand swing.  No tremor noted.   Psychiatric:         Mood and Affect: Mood normal.         Behavior: Behavior normal.         Thought Content: Thought content normal.         Judgment: Judgment normal.           Assessment:       1. Essential hypertension    2. Need for vaccination    3. Lumbar disc disease    4. Parkinson's disease without dyskinesia or fluctuating manifestations    5. Bipolar disorder, in full remission, most recent episode depressed    6. Conductive hearing loss, bilateral    7. High cholesterol    8. Stage 3a chronic kidney disease    9. Menopause    10. Purpura senilis    11. Gastroesophageal reflux disease without esophagitis    12. Other hemochromatosis         Plan:       Essential hypertension  -     CBC Auto Differential; Future; Expected date: 11/16/2023  -     Comprehensive Metabolic Panel; Future; Expected date: 11/16/2023  -     Lipid Panel; Future; Expected date: 11/16/2023  -     TSH w/reflex to FT4; Future; Expected date: 11/16/2023  -     Urinalysis, Reflex to Urine Culture Urine, Clean Catch; Future; Expected date: 11/16/2023  Has an element of white coat hypertension, her blood pressure now down to 148/80  it runs lower at home., repeat labs in 6 months  Need for vaccination  -     Influenza (FLUAD) - Quadrivalent (Adjuvanted)  *Preferred* (65+) (PF)  Flu shot today  Lumbar disc disease  Improves with activity  Parkinson's disease without dyskinesia or fluctuating manifestations  On no medications  Bipolar disorder, in full remission, most recent episode depressed  Lithium 150 mg HS  Conductive hearing loss, bilateral  Hearing aids working well  High cholesterol  Cholesterol 171 HDL 63 TG 94 LDL 89, excellent lipid panel  Stage 3a chronic kidney disease  GFR 46 and stable  Menopause    Purpura senilis    Gastroesophageal reflux disease without esophagitis  Resume omeprazole  Other hemochromatosis  Ferritin level followed by Dr. Monroe    Follow up in about 6 months (around 5/16/2024), or htn.        11/16/2023 Max Landeros

## 2023-12-28 ENCOUNTER — LAB VISIT (OUTPATIENT)
Dept: LAB | Facility: HOSPITAL | Age: 78
End: 2023-12-28
Attending: INTERNAL MEDICINE
Payer: COMMERCIAL

## 2023-12-28 DIAGNOSIS — N18.30 CHRONIC KIDNEY DISEASE, STAGE III (MODERATE): Primary | ICD-10-CM

## 2023-12-28 DIAGNOSIS — N18.30 CHRONIC KIDNEY DISEASE, STAGE III (MODERATE): ICD-10-CM

## 2023-12-28 DIAGNOSIS — E55.9 AVITAMINOSIS D: ICD-10-CM

## 2023-12-28 LAB
25(OH)D3+25(OH)D2 SERPL-MCNC: 66 NG/ML (ref 30–96)
ALBUMIN SERPL BCP-MCNC: 4.2 G/DL (ref 3.5–5.2)
ANION GAP SERPL CALC-SCNC: 8 MMOL/L (ref 8–16)
BACTERIA #/AREA URNS HPF: ABNORMAL /HPF
BUN SERPL-MCNC: 26 MG/DL (ref 8–23)
CALCIUM SERPL-MCNC: 10 MG/DL (ref 8.7–10.5)
CHLORIDE SERPL-SCNC: 107 MMOL/L (ref 95–110)
CO2 SERPL-SCNC: 26 MMOL/L (ref 23–29)
CREAT SERPL-MCNC: 1.2 MG/DL (ref 0.5–1.4)
CREAT UR-MCNC: 80.9 MG/DL (ref 15–325)
EST. GFR  (NO RACE VARIABLE): 45.8 ML/MIN/1.73 M^2
GLUCOSE SERPL-MCNC: 103 MG/DL (ref 70–110)
HYALINE CASTS #/AREA URNS LPF: 2 /LPF
MICROSCOPIC COMMENT: ABNORMAL
PHOSPHATE SERPL-MCNC: 3.8 MG/DL (ref 2.7–4.5)
POTASSIUM SERPL-SCNC: 4.2 MMOL/L (ref 3.5–5.1)
PROT UR-MCNC: 14 MG/DL (ref 6–15)
PROT/CREAT UR: 0.17 MG/G{CREAT} (ref 0–0.2)
RBC #/AREA URNS HPF: 0 /HPF (ref 0–4)
SODIUM SERPL-SCNC: 141 MMOL/L (ref 136–145)
SQUAMOUS #/AREA URNS HPF: 1 /HPF
WBC #/AREA URNS HPF: 11 /HPF (ref 0–5)

## 2023-12-28 PROCEDURE — 82306 VITAMIN D 25 HYDROXY: CPT | Performed by: INTERNAL MEDICINE

## 2023-12-28 PROCEDURE — 81001 URINALYSIS AUTO W/SCOPE: CPT | Performed by: INTERNAL MEDICINE

## 2023-12-28 PROCEDURE — 84156 ASSAY OF PROTEIN URINE: CPT | Performed by: INTERNAL MEDICINE

## 2023-12-28 PROCEDURE — 80069 RENAL FUNCTION PANEL: CPT | Performed by: INTERNAL MEDICINE

## 2023-12-28 PROCEDURE — 36415 COLL VENOUS BLD VENIPUNCTURE: CPT | Performed by: INTERNAL MEDICINE

## 2024-01-03 DIAGNOSIS — Z12.31 ENCOUNTER FOR SCREENING MAMMOGRAM FOR MALIGNANT NEOPLASM OF BREAST: Primary | ICD-10-CM

## 2024-01-22 ENCOUNTER — HOSPITAL ENCOUNTER (OUTPATIENT)
Dept: RADIOLOGY | Facility: HOSPITAL | Age: 79
Discharge: HOME | End: 2024-01-22
Attending: FAMILY MEDICINE
Payer: COMMERCIAL

## 2024-01-22 DIAGNOSIS — Z12.31 ENCOUNTER FOR SCREENING MAMMOGRAM FOR MALIGNANT NEOPLASM OF BREAST: ICD-10-CM

## 2024-01-22 PROCEDURE — 77067 SCR MAMMO BI INCL CAD: CPT | Mod: TC,PO

## 2024-01-30 ENCOUNTER — TELEPHONE (OUTPATIENT)
Dept: FAMILY MEDICINE | Facility: CLINIC | Age: 79
End: 2024-01-30
Payer: COMMERCIAL

## 2024-01-30 NOTE — TELEPHONE ENCOUNTER
Spoke to patient with result verbatim per Dr Landeros. Verbalized understanding to repeat in 1 year.

## 2024-01-30 NOTE — TELEPHONE ENCOUNTER
----- Message from Max Landeros MD sent at 1/30/2024  8:33 AM CST -----  Call patient.  Mammogram was normal.  Repeat mammogram in 1 year.

## 2024-03-04 ENCOUNTER — LAB VISIT (OUTPATIENT)
Dept: LAB | Facility: HOSPITAL | Age: 79
End: 2024-03-04
Attending: INTERNAL MEDICINE
Payer: COMMERCIAL

## 2024-03-04 DIAGNOSIS — E83.118 OTHER HEMOCHROMATOSIS: ICD-10-CM

## 2024-03-04 LAB
BASOPHILS # BLD AUTO: 0.07 K/UL (ref 0–0.2)
BASOPHILS NFR BLD: 1.3 % (ref 0–1.9)
DIFFERENTIAL METHOD BLD: NORMAL
EOSINOPHIL # BLD AUTO: 0.2 K/UL (ref 0–0.5)
EOSINOPHIL NFR BLD: 3.6 % (ref 0–8)
ERYTHROCYTE [DISTWIDTH] IN BLOOD BY AUTOMATED COUNT: 13 % (ref 11.5–14.5)
FERRITIN SERPL-MCNC: 58.5 NG/ML (ref 20–300)
HCT VFR BLD AUTO: 40.2 % (ref 37–48.5)
HGB BLD-MCNC: 13.5 G/DL (ref 12–16)
IMM GRANULOCYTES # BLD AUTO: 0.01 K/UL (ref 0–0.04)
IMM GRANULOCYTES NFR BLD AUTO: 0.2 % (ref 0–0.5)
LYMPHOCYTES # BLD AUTO: 1.5 K/UL (ref 1–4.8)
LYMPHOCYTES NFR BLD: 27.5 % (ref 18–48)
MCH RBC QN AUTO: 30.8 PG (ref 27–31)
MCHC RBC AUTO-ENTMCNC: 33.6 G/DL (ref 32–36)
MCV RBC AUTO: 92 FL (ref 82–98)
MONOCYTES # BLD AUTO: 0.5 K/UL (ref 0.3–1)
MONOCYTES NFR BLD: 9.3 % (ref 4–15)
NEUTROPHILS # BLD AUTO: 3.2 K/UL (ref 1.8–7.7)
NEUTROPHILS NFR BLD: 58.1 % (ref 38–73)
NRBC BLD-RTO: 0 /100 WBC
PLATELET # BLD AUTO: 225 K/UL (ref 150–450)
PMV BLD AUTO: 10.3 FL (ref 9.2–12.9)
RBC # BLD AUTO: 4.39 M/UL (ref 4–5.4)
WBC # BLD AUTO: 5.5 K/UL (ref 3.9–12.7)

## 2024-03-04 PROCEDURE — 82728 ASSAY OF FERRITIN: CPT | Performed by: INTERNAL MEDICINE

## 2024-03-04 PROCEDURE — 36415 COLL VENOUS BLD VENIPUNCTURE: CPT | Performed by: INTERNAL MEDICINE

## 2024-03-04 PROCEDURE — 85025 COMPLETE CBC W/AUTO DIFF WBC: CPT | Performed by: INTERNAL MEDICINE

## 2024-03-11 ENCOUNTER — OFFICE VISIT (OUTPATIENT)
Dept: HEMATOLOGY/ONCOLOGY | Facility: CLINIC | Age: 79
End: 2024-03-11
Payer: COMMERCIAL

## 2024-03-11 VITALS
WEIGHT: 148.19 LBS | BODY MASS INDEX: 26.25 KG/M2 | HEART RATE: 68 BPM | RESPIRATION RATE: 17 BRPM | DIASTOLIC BLOOD PRESSURE: 67 MMHG | TEMPERATURE: 98 F | SYSTOLIC BLOOD PRESSURE: 185 MMHG

## 2024-03-11 DIAGNOSIS — R79.89 ELEVATED FERRITIN: Primary | ICD-10-CM

## 2024-03-11 PROCEDURE — 99214 OFFICE O/P EST MOD 30 MIN: CPT | Mod: S$GLB,,, | Performed by: INTERNAL MEDICINE

## 2024-03-11 PROCEDURE — 1159F MED LIST DOCD IN RCRD: CPT | Mod: CPTII,S$GLB,, | Performed by: INTERNAL MEDICINE

## 2024-03-11 PROCEDURE — 3078F DIAST BP <80 MM HG: CPT | Mod: CPTII,S$GLB,, | Performed by: INTERNAL MEDICINE

## 2024-03-11 PROCEDURE — 3077F SYST BP >= 140 MM HG: CPT | Mod: CPTII,S$GLB,, | Performed by: INTERNAL MEDICINE

## 2024-03-11 PROCEDURE — 3288F FALL RISK ASSESSMENT DOCD: CPT | Mod: CPTII,S$GLB,, | Performed by: INTERNAL MEDICINE

## 2024-03-11 PROCEDURE — 1101F PT FALLS ASSESS-DOCD LE1/YR: CPT | Mod: CPTII,S$GLB,, | Performed by: INTERNAL MEDICINE

## 2024-03-11 PROCEDURE — 1126F AMNT PAIN NOTED NONE PRSNT: CPT | Mod: CPTII,S$GLB,, | Performed by: INTERNAL MEDICINE

## 2024-03-12 NOTE — PROGRESS NOTES
[facility] hematology oncology in office follow-up encounter progress note    3/11/24    Subjective:      Patient ID:   Payton Gutierrez  78 y.o. female  1945  [place]      Chief Complaint:   Iron disorder follow up    HPI:  78 y.o. female with diagnosis of increased iron stores, hemochromatosis treated with intermittent phlebotomy in the past.  Phlebotomy is on hold. Her last therapeutic phlebotomy   was 5 years  Ago.    Ferritin went from 132 to 206.  Hgb 13.6.  Resumed phlebotomy schedule, orders written.    Ferritin came down to  32, and has gradually increased to 87 over 6 months.  Hgb 13.2, observe for now.  Plan to resume phlebotomy at 200.  Recent ferritin stable at 58, Hgb 13.5, Observe for now.    She has not had covid 19 infection.  She did take 2/2 vaccines.  She is undecided if she will take the booster vaccine.    Had recent episode of diverticulitis, being evaluated for surgery is sx recur.  R hip pain sx, DDD.    She has history of GERD, glaucoma, hypercholesterolemia,hypertension, and has been diagnosed with Parkinson's disease.  She sees Dr. Spencer.  He tells me her Parkinson's symptoms have resolved, and she is off Parkinson medications.  Parkinson's Dx ?    She is status post tonsillectomy, appendectomy, complete hysterectomy.    She is a retired , she is allergic to codeine. She does not smoke, she does not drink alcohol with regularity.    Her dad passed away of a motor vehicle accident and her mother  of vaginal cancer. She has a brother with the hemochromatosis gene and increased ferritin on intermittent phlebotomy.    She denies gallbladder disease jaundice or hepatitis.  She is on lithium.  She is seeing Dr. Watts for renal dx.  She recently had diverticulitis.    2nd cousin Adry STARKS.    ROS:   GEN: normal without any fever, night sweats or weight loss  HEENT: normal with no HA's, sore throat, stiff neck, changes in vision  CV: normal with no CP, SOB, PND, SAUNDERS or  orthopnea  PULM: normal with no SOB, cough, hemoptysis, sputum or pleuritic pain  GI: normal with no abdominal pain, nausea, vomiting, constipation, diarrhea, melanotic stools, BRBPR, or hematemesis  : normal with no hematuria, dysuria  BREAST: normal with no mass, discharge, pain  SKIN: normal with no rash, erythema, bruising, or swelling     Past Medical History:   Diagnosis Date    Glaucoma      Past Surgical History:   Procedure Laterality Date    CATARACT EXTRACTION  2016    HYSTERECTOMY         Review of patient's allergies indicates:   Allergen Reactions    Codeine     Sulfa (sulfonamide antibiotics)            Current Outpatient Medications:     diclofenac sodium (VOLTAREN) 1 % Gel, Apply 2 g topically once daily. for 10 days, Disp: 450 g, Rfl: 0    ergocalciferol, vitamin D2, (VITAMIN D ORAL), Take 1 tablet by mouth once daily., Disp: , Rfl:     lisinopriL (PRINIVIL,ZESTRIL) 20 MG tablet, Take 1 tablet by mouth once daily., Disp: , Rfl:     lithium carbonate 150 MG capsule, Take 1 capsule (150 mg total) by mouth once daily., Disp: 90 capsule, Rfl: 3    LUMIGAN 0.01 % Drop, Place 1 drop into both eyes every evening., Disp: , Rfl:     nitrofurantoin, macrocrystal-monohydrate, (MACROBID) 100 MG capsule, Take 1 capsule (100 mg total) by mouth 2 (two) times daily., Disp: 20 capsule, Rfl: 0    omeprazole (PRILOSEC) 20 MG capsule, Take 1 capsule (20 mg total) by mouth once daily., Disp: 90 capsule, Rfl: 3    rosuvastatin (CRESTOR) 10 MG tablet, Take 1 tablet (10 mg total) by mouth once daily., Disp: 90 tablet, Rfl: 3          Objective:   Vitals:  Blood pressure (!) 185/67, pulse 68, temperature 97.7 °F (36.5 °C), resp. rate 17, weight 67.2 kg (148 lb 3.2 oz).    Physical Examination:   GEN: no apparent distress, comfortable  HEAD: atraumatic and normocephalic  EYES: no pallor, no icterus  ENT: OMM, no pharyngeal erythema, external ears WNL; no nasal discharge; no thrush  NECK: no masses, thyroid normal, trachea  midline, no LAD/LN's, supple  CV: RRR with no murmur; normal pulse; normal S1 and S2; no pedal edema  CHEST: Normal respiratory effort; CTAB; normal breath sounds; no wheeze or crackles  ABDOM: nontender and nondistended; soft;  no rebound/guarding, the liver and spleen are not palpable  MUSC/Skeletal: ROM normal; no crepitus; joints normal  EXTREM: no clubbing, cyanosis, inflammation or swelling  SKIN: no rashes, lesions, ulcers, petechia  : no cvat  NEURO: grossly intact; motor/sensory WNL;  no tremors  PSYCH: normal mood, affect and behavior  LYMPH: normal cervical, supraclavicular, axillary and groin LN's  BREASTS:she left the bra on, I did not examine the breasts      Dr. Jain did colonoscopy on August 17, 2017, diverticular disease were found, biopsy was negative for colitis.  Hgb 13.5, ferritin 58 now.    Mamm 1/22/24 negative.    Assessment:   (1) 78 y.o. female with diagnosis of increased iron stores, or hemochromatosis. She is status post intermittent phlebotomy in the past. Ferritin level is now 58.  Observe for now.  Plan to resume phlebotomy at ferritin of 200.    (2)other history includes hypertension, hypercholesterolemia, she will follow-up with Dr. Landeros for primary care.     (3)She has recently been diagnosed with Parkinson's disease? and Dr. Chapa for renal.    Return to clinic here in 6 months with CBC and ferritin.  [facility].

## 2024-04-15 ENCOUNTER — TELEPHONE (OUTPATIENT)
Dept: FAMILY MEDICINE | Facility: CLINIC | Age: 79
End: 2024-04-15

## 2024-04-15 NOTE — TELEPHONE ENCOUNTER
----- Message from Aleah Levin sent at 4/15/2024  4:04 PM CDT -----  Regarding: bp readings  Dropped off bp readings given to Soco famb

## 2024-04-16 ENCOUNTER — CLINICAL SUPPORT (OUTPATIENT)
Dept: FAMILY MEDICINE | Facility: CLINIC | Age: 79
End: 2024-04-16
Payer: COMMERCIAL

## 2024-04-16 VITALS
OXYGEN SATURATION: 99 % | HEART RATE: 77 BPM | WEIGHT: 145.5 LBS | DIASTOLIC BLOOD PRESSURE: 64 MMHG | BODY MASS INDEX: 25.77 KG/M2 | SYSTOLIC BLOOD PRESSURE: 186 MMHG

## 2024-04-16 DIAGNOSIS — I10 ESSENTIAL HYPERTENSION: Primary | ICD-10-CM

## 2024-04-16 PROCEDURE — 99499 UNLISTED E&M SERVICE: CPT | Mod: S$GLB,,, | Performed by: FAMILY MEDICINE

## 2024-04-16 RX ORDER — AMLODIPINE BESYLATE 2.5 MG/1
2.5 TABLET ORAL NIGHTLY
Qty: 90 TABLET | Refills: 3 | Status: SHIPPED | OUTPATIENT
Start: 2024-04-16 | End: 2024-04-30 | Stop reason: SDUPTHER

## 2024-04-16 NOTE — PROGRESS NOTES
Patient was here for BP check.  Patient states she is taking her lisinopril 20 mg daily and Crestor 10 mg daily, as well as all other meds.  /68        186/64  Patient states she does not have a headache, no n/v, no dizziness, no palpitations, no chest pain, no vision changes. Does have chronic diverticulitis with abdominal pain and diarrhea. States she feels tired because she doesn't sleep well, Sporadic sleeping of 3 hours then awakes. Saw ophthalmologist yesterday and everything was well, per patient.     Per Ivonne YADAV. Will add Amlodipine 2.5 mg 1 tablet nightly. Come in 1-2 weeks for recheck BP and bring home blood pressure cuff.     I informed patient to start new medication Amlodipine 2.5 mg 1 tab nightly and to recheck in 1-2 weeks and bring home BP cuff. Gave new blood pressure log.

## 2024-04-30 ENCOUNTER — CLINICAL SUPPORT (OUTPATIENT)
Dept: FAMILY MEDICINE | Facility: CLINIC | Age: 79
End: 2024-04-30
Payer: COMMERCIAL

## 2024-04-30 VITALS — SYSTOLIC BLOOD PRESSURE: 162 MMHG | HEART RATE: 72 BPM | DIASTOLIC BLOOD PRESSURE: 68 MMHG

## 2024-04-30 DIAGNOSIS — I10 ESSENTIAL HYPERTENSION: Primary | ICD-10-CM

## 2024-04-30 RX ORDER — AMLODIPINE BESYLATE 5 MG/1
5 TABLET ORAL NIGHTLY
Qty: 90 TABLET | Refills: 3 | Status: SHIPPED | OUTPATIENT
Start: 2024-04-30 | End: 2025-04-30

## 2024-04-30 NOTE — PROGRESS NOTES
Pt here for BP check. Brought home logs and home cuff.  Cuff calibrated in office.   Reading elevated at home and in the office   Pt confirmed taking Amlodipine 2.5mg and lisinopril 20mg    Per Dr. Landeros increase amlodipine to 5mg nightly and come back for 2 week nv.   Gave pt written and verbal instructions. Pt reports understanding. NV scheduled.

## 2024-05-02 ENCOUNTER — HOSPITAL ENCOUNTER (EMERGENCY)
Facility: HOSPITAL | Age: 79
Discharge: HOME | End: 2024-05-02
Attending: EMERGENCY MEDICINE
Payer: COMMERCIAL

## 2024-05-02 VITALS
OXYGEN SATURATION: 99 % | SYSTOLIC BLOOD PRESSURE: 172 MMHG | WEIGHT: 145 LBS | HEART RATE: 78 BPM | TEMPERATURE: 98 F | RESPIRATION RATE: 16 BRPM | BODY MASS INDEX: 25.69 KG/M2 | DIASTOLIC BLOOD PRESSURE: 69 MMHG

## 2024-05-02 DIAGNOSIS — I10 HYPERTENSION, UNSPECIFIED TYPE: ICD-10-CM

## 2024-05-02 DIAGNOSIS — R10.9 ABDOMINAL PAIN, UNSPECIFIED ABDOMINAL LOCATION: Primary | ICD-10-CM

## 2024-05-02 DIAGNOSIS — R19.7 DIARRHEA, UNSPECIFIED TYPE: ICD-10-CM

## 2024-05-02 LAB
ALBUMIN SERPL BCP-MCNC: 4.8 G/DL (ref 3.5–5.2)
ALP SERPL-CCNC: 139 U/L (ref 55–135)
ALT SERPL W/O P-5'-P-CCNC: 13 U/L (ref 10–44)
ANION GAP SERPL CALC-SCNC: 10 MMOL/L (ref 8–16)
AST SERPL-CCNC: 15 U/L (ref 10–40)
BASOPHILS # BLD AUTO: 0.06 K/UL (ref 0–0.2)
BASOPHILS NFR BLD: 1.2 % (ref 0–1.9)
BILIRUB SERPL-MCNC: 0.8 MG/DL (ref 0.1–1)
BILIRUB UR QL STRIP: NEGATIVE
BUN SERPL-MCNC: 21 MG/DL (ref 8–23)
CALCIUM SERPL-MCNC: 10.3 MG/DL (ref 8.7–10.5)
CHLORIDE SERPL-SCNC: 107 MMOL/L (ref 95–110)
CLARITY UR: CLEAR
CO2 SERPL-SCNC: 25 MMOL/L (ref 23–29)
COLOR UR: YELLOW
CREAT SERPL-MCNC: 1.2 MG/DL (ref 0.5–1.4)
DIFFERENTIAL METHOD BLD: NORMAL
EOSINOPHIL # BLD AUTO: 0.2 K/UL (ref 0–0.5)
EOSINOPHIL NFR BLD: 3 % (ref 0–8)
ERYTHROCYTE [DISTWIDTH] IN BLOOD BY AUTOMATED COUNT: 12.7 % (ref 11.5–14.5)
EST. GFR  (NO RACE VARIABLE): 45.8 ML/MIN/1.73 M^2
GLUCOSE SERPL-MCNC: 93 MG/DL (ref 70–110)
GLUCOSE UR QL STRIP: NEGATIVE
HCT VFR BLD AUTO: 41.8 % (ref 37–48.5)
HGB BLD-MCNC: 14.2 G/DL (ref 12–16)
HGB UR QL STRIP: ABNORMAL
IMM GRANULOCYTES # BLD AUTO: 0.01 K/UL (ref 0–0.04)
IMM GRANULOCYTES NFR BLD AUTO: 0.2 % (ref 0–0.5)
KETONES UR QL STRIP: ABNORMAL
LEUKOCYTE ESTERASE UR QL STRIP: NEGATIVE
LIPASE SERPL-CCNC: 26 U/L (ref 4–60)
LYMPHOCYTES # BLD AUTO: 1.2 K/UL (ref 1–4.8)
LYMPHOCYTES NFR BLD: 23.8 % (ref 18–48)
MCH RBC QN AUTO: 30 PG (ref 27–31)
MCHC RBC AUTO-ENTMCNC: 34 G/DL (ref 32–36)
MCV RBC AUTO: 88 FL (ref 82–98)
MICROSCOPIC COMMENT: NORMAL
MONOCYTES # BLD AUTO: 0.4 K/UL (ref 0.3–1)
MONOCYTES NFR BLD: 8.1 % (ref 4–15)
NEUTROPHILS # BLD AUTO: 3.2 K/UL (ref 1.8–7.7)
NEUTROPHILS NFR BLD: 63.7 % (ref 38–73)
NITRITE UR QL STRIP: NEGATIVE
NRBC BLD-RTO: 0 /100 WBC
PH UR STRIP: 6 [PH] (ref 5–8)
PLATELET # BLD AUTO: 230 K/UL (ref 150–450)
PMV BLD AUTO: 10.1 FL (ref 9.2–12.9)
POTASSIUM SERPL-SCNC: 3.6 MMOL/L (ref 3.5–5.1)
PROT SERPL-MCNC: 7.7 G/DL (ref 6–8.4)
PROT UR QL STRIP: ABNORMAL
RBC # BLD AUTO: 4.74 M/UL (ref 4–5.4)
RBC #/AREA URNS HPF: 1 /HPF (ref 0–4)
SODIUM SERPL-SCNC: 142 MMOL/L (ref 136–145)
SP GR UR STRIP: 1.02 (ref 1–1.03)
URN SPEC COLLECT METH UR: ABNORMAL
UROBILINOGEN UR STRIP-ACNC: NEGATIVE EU/DL
WBC # BLD AUTO: 5.04 K/UL (ref 3.9–12.7)
WBC #/AREA URNS HPF: 2 /HPF (ref 0–5)

## 2024-05-02 PROCEDURE — 85025 COMPLETE CBC W/AUTO DIFF WBC: CPT | Performed by: NURSE PRACTITIONER

## 2024-05-02 PROCEDURE — 83690 ASSAY OF LIPASE: CPT | Performed by: NURSE PRACTITIONER

## 2024-05-02 PROCEDURE — 25000003 PHARM REV CODE 250: Performed by: NURSE PRACTITIONER

## 2024-05-02 PROCEDURE — 81001 URINALYSIS AUTO W/SCOPE: CPT | Performed by: NURSE PRACTITIONER

## 2024-05-02 PROCEDURE — 96361 HYDRATE IV INFUSION ADD-ON: CPT

## 2024-05-02 PROCEDURE — 80053 COMPREHEN METABOLIC PANEL: CPT | Performed by: NURSE PRACTITIONER

## 2024-05-02 PROCEDURE — 96360 HYDRATION IV INFUSION INIT: CPT

## 2024-05-02 PROCEDURE — 99285 EMERGENCY DEPT VISIT HI MDM: CPT | Mod: 25

## 2024-05-02 RX ADMIN — SODIUM CHLORIDE 1000 ML: 9 INJECTION, SOLUTION INTRAVENOUS at 04:05

## 2024-05-02 NOTE — ED PROVIDER NOTES
Encounter Date: 5/2/2024       History     Chief Complaint   Patient presents with    Abdominal Pain     Lower right quadrant abdominal pain - hx of diverticulitis     Presents with complaint of lower abdominal pain.  Onset since the 15th of April.  Patient sees Dr. Jain.  She can not get an appointment until the 20th of May.  Patient reports she did call him.  He called in Augmentin.  She now has diarrhea.  She said she had been having some diarrhea on and off but once she started taking the medication diarrhea has increased.  She rates her pain in the abdomen at a 7.  She has a friend at bedside.  She denies fever nausea vomiting or diarrhea.  She has a history of diverticulitis      Review of patient's allergies indicates:   Allergen Reactions    Codeine      Other reaction(s): Not available    Sulfa (sulfonamide antibiotics)      Other reaction(s): Not available    Sulfur Other (See Comments)     Past Medical History:   Diagnosis Date    Glaucoma      Past Surgical History:   Procedure Laterality Date    CATARACT EXTRACTION  2016    HYSTERECTOMY       Family History   Problem Relation Name Age of Onset    Cancer Mother       Social History     Tobacco Use    Smoking status: Never    Smokeless tobacco: Never   Substance Use Topics    Alcohol use: No    Drug use: No     Review of Systems   Constitutional:  Negative for fever.   Respiratory:  Negative for cough, shortness of breath and wheezing.    Cardiovascular:  Negative for chest pain, palpitations and leg swelling.   Gastrointestinal:  Positive for abdominal pain and diarrhea. Negative for abdominal distention, nausea and vomiting.   Genitourinary:  Negative for difficulty urinating, dysuria and frequency.   Musculoskeletal:  Negative for back pain and gait problem.   Skin:  Negative for rash.   Neurological:  Negative for weakness.       Physical Exam     Initial Vitals [05/02/24 1330]   BP Pulse Resp Temp SpO2   (!) 173/74 75 18 97.9 °F (36.6 °C) 97 %       MAP       --         Physical Exam    Constitutional: She appears well-developed and well-nourished.   HENT:   Head: Normocephalic and atraumatic.   Eyes: Conjunctivae are normal.   Neck:   Normal range of motion.  Cardiovascular:  Normal rate and regular rhythm.           Pulmonary/Chest: Breath sounds normal. No respiratory distress.   Abdominal: Abdomen is soft. Bowel sounds are normal. She exhibits no distension. There is abdominal tenderness.   Mild tenderness lower abdomen.  No rebound no guarding.  Abdomen is soft nondistended.  Bowel sounds are positive. There is no rebound and no guarding.   Musculoskeletal:         General: Normal range of motion.      Cervical back: Normal range of motion.     Neurological: She is alert and oriented to person, place, and time. No sensory deficit. GCS score is 15. GCS eye subscore is 4. GCS verbal subscore is 5. GCS motor subscore is 6.   Skin: Skin is warm and dry. Capillary refill takes less than 2 seconds.   Psychiatric: She has a normal mood and affect. Thought content normal.         ED Course   Procedures  Labs Reviewed   COMPREHENSIVE METABOLIC PANEL - Abnormal; Notable for the following components:       Result Value    Alkaline Phosphatase 139 (*)     eGFR 45.8 (*)     All other components within normal limits   URINALYSIS, REFLEX TO URINE CULTURE - Abnormal; Notable for the following components:    Protein, UA Trace (*)     Ketones, UA 1+ (*)     Occult Blood UA 1+ (*)     All other components within normal limits    Narrative:     Specimen Source->Urine   CBC W/ AUTO DIFFERENTIAL   LIPASE   URINALYSIS MICROSCOPIC    Narrative:     Specimen Source->Urine          Imaging Results              CT Abdomen Pelvis  Without Contrast (Final result)  Result time 05/02/24 14:30:23      Final result by Lloyd Richards MD (05/02/24 14:30:23)                   Impression:      1. Diverticulosis coli, without evidence of acute diverticulitis.  2. No renal or ureteral  calculi, or hydroureteronephrosis.      Electronically signed by: Lloyd Richards  Date:    05/02/2024  Time:    14:30               Narrative:    EXAMINATION:  CT ABDOMEN PELVIS WITHOUT CONTRAST    CLINICAL HISTORY:  LLQ abdominal pain;pmhx of diverticulitis.  Also RLQ pain.;    TECHNIQUE:  Non-contrast axial images were obtained. Non-enhanced study is tailored for the detection of urolithiasis, and is insensitive for abnormalities of the solid organs, vasculature and hollow viscera.    COMPARISON:  Comparison to prior exams including 10/07/2022    FINDINGS:  CT ABDOMEN: Densely calcified granuloma lies in the lingula, with the visualized lung bases otherwise clear.  The unenhanced liver and gallbladder are unremarkable, with the unenhanced spleen normal in size and unremarkable.  There is fatty atrophy of the pancreas, which is otherwise unremarkable.  The adrenal glands are unremarkable.    There are no renal or ureteral calculi, with no hydroureteronephrosis.  The unenhanced kidneys are unremarkable.  Scattered calcified plaque involves normal-caliber abdominal aorta and iliac arteries.  No bowel obstruction, ascites, or intraperitoneal free air.  There are scattered colon diverticula.  The appendix is not identified.    CT PELVIS: There are numerous sigmoid colon diverticula, without evidence of acute diverticulitis.  The unenhanced rectum and urinary bladder are unremarkable, with the uterus surgically absent.  There are no distal ureteral or bladder calculi, with several calcified pelvic phleboliths.  No pelvic free fluid.    There are no enlarged pelvic or inguinal lymph nodes.  The unenhanced extraperitoneal soft tissues are unremarkable.  There are no acute fractures or destructive osseous lesions, with intervertebral disc space narrowing and facet arthropathy in the lumbar spine.                                       Medications   sodium chloride 0.9% bolus 1,000 mL 1,000 mL (0 mLs Intravenous Stopped 5/2/24  1918)     Medical Decision Making  Presents with complaint of lower abdominal pain.  Onset on and off since the 14th of April.  Patient reports diarrhea on and off since then.  She has a history of diverticulitis.  She called Dr. Jain who is her GI doctor she can not get appointment until May 20th.  They did call in Augmentin for her.  She reports it since she started taking the Augmentin her diarrhea has got worse and is now steady.  She denies fever nausea vomiting.  She rates her pain currently at a 7/10.    Amount and/or Complexity of Data Reviewed  Labs:      Details: Labs are within [facility] limit this patient.  Radiology:      Details: CT of the abdomen without contrast reports diverticulosis without acute diverticulitis.  Discussion of management or test interpretation with external provider(s): Patient was not given anything for her abdominal pain.  At no time did she appear to be in any pain.  Her pain came down steadily without any medication to a 5.  It is now resolved.  It has resolved for the last 2 hours.  She has been here so long because she has been waiting on getting a urine for me.  Her urine is also negative.  I have instructed her to stop the Augmentin.  Also instructed her to follow up with Dr. Jain on the 20th.  She was given strict return precautions.  She and her friend at bedside verbalized understanding.  Patient has been struggling for the past few weeks with her blood pressure.  She sees her primary care doctor.  She has an appointment tomorrow.  She denies any blood pressure issues such as headache or blurred vision.  She denies dizziness.  He has added Norvasc 2.5 mg to her blood pressure machine.  Two days ago when she saw him he increased it to 5 mg.  A discharge and at no time while in the ED did she ever appear to be in any acute distress.                                      Clinical Impression:  Final diagnoses:  [R10.9] Abdominal pain, unspecified abdominal location -  Resolved (Primary)  [I10] Hypertension, unspecified type  [R19.7] Diarrhea, unspecified type          ED Disposition Condition    Discharge Stable          ED Prescriptions    None       Follow-up Information       Follow up With Specialties Details Why Contact Info    Rodrigo Jain MD Gastroenterology In 3 days  [address]  [address]  [facility] [address]  [phone]      Max Landeros MD Family Medicine, Home Health Services, Hospice Services In 3 days  [address]  [address]  [place] [address]  [phone]               Cora Jc, FLOWER  05/03/24 1298

## 2024-05-02 NOTE — FIRST PROVIDER EVALUATION
" Emergency Department TeleTriage Encounter Note      CHIEF COMPLAINT    Chief Complaint   Patient presents with    Abdominal Pain     Lower right quadrant abdominal pain - hx of diverticulitis       VITAL SIGNS   Initial Vitals [05/02/24 1330]   BP Pulse Resp Temp SpO2   (!) 173/74 75 18 97.9 °F (36.6 °C) 97 %      MAP       --            ALLERGIES    Review of patient's allergies indicates:   Allergen Reactions    Codeine      Other reaction(s): Not available    Sulfa (sulfonamide antibiotics)      Other reaction(s): Not available    Sulfur Other (See Comments)       PROVIDER TRIAGE NOTE  This is a teletriage evaluation of a 78 y.o. female presenting to the ED complaining of lower abd pain for three weeks. Reports associated "severe" diarrhea. No rectal bleeding. Pmhx of diverticulitis.    Alert, no distress.     Initial orders will be placed and care will be transferred to an alternate provider when patient is roomed for a full evaluation. Any additional orders and the final disposition will be determined by that provider.         ORDERS  Labs Reviewed - No data to display    ED Orders (720h ago, onward)      Start Ordered     Status Ordering Provider    05/02/24 1343 05/02/24 1342  Vital signs  Every 2 hours         Ordered FLORY GOMEZ N.    05/02/24 1343 05/02/24 1342  Diet NPO  Diet effective now         Ordered FLORY GOMEZ N.    05/02/24 1343 05/02/24 1342  Insert peripheral IV  Once         Ordered FLORY GOMEZ N.    05/02/24 1343 05/02/24 1342  CBC W/ AUTO DIFFERENTIAL  STAT         Ordered FLORY GOMEZ N.    05/02/24 1343 05/02/24 1342  Comp. Metabolic Panel  STAT         Ordered FLORY GOMEZ N.    05/02/24 1343 05/02/24 1342  Lipase  STAT         Ordered FLORY GOMEZ N.    05/02/24 1343 05/02/24 1342  Urinalysis, Reflex to Urine Culture Urine, Clean Catch  STAT         Ordered FLORY GOMEZ              Virtual Visit Note: The " provider triage portion of this emergency department evaluation and documentation was performed via [employer], a HIPAA-compliant telemedicine application, in concert with a tele-presenter in the room. A face to face patient evaluation with one of my colleagues will occur once the patient is placed in an emergency department room.      DISCLAIMER: This note was prepared with [employer] voice recognition transcription software. Garbled syntax, mangled pronouns, and other bizarre constructions may be attributed to that software system.

## 2024-05-08 ENCOUNTER — TELEPHONE (OUTPATIENT)
Dept: FAMILY MEDICINE | Facility: CLINIC | Age: 79
End: 2024-05-08
Payer: COMMERCIAL

## 2024-05-08 DIAGNOSIS — Z79.899 ENCOUNTER FOR LONG-TERM (CURRENT) USE OF OTHER MEDICATIONS: Primary | ICD-10-CM

## 2024-05-08 DIAGNOSIS — E78.00 HIGH CHOLESTEROL: ICD-10-CM

## 2024-05-08 DIAGNOSIS — Z78.0 MENOPAUSE: ICD-10-CM

## 2024-05-08 DIAGNOSIS — I10 ESSENTIAL HYPERTENSION: ICD-10-CM

## 2024-05-08 DIAGNOSIS — N18.31 STAGE 3A CHRONIC KIDNEY DISEASE: ICD-10-CM

## 2024-05-09 ENCOUNTER — CLINICAL SUPPORT (OUTPATIENT)
Dept: FAMILY MEDICINE | Facility: CLINIC | Age: 79
End: 2024-05-09
Payer: COMMERCIAL

## 2024-05-09 VITALS — SYSTOLIC BLOOD PRESSURE: 160 MMHG | DIASTOLIC BLOOD PRESSURE: 60 MMHG

## 2024-05-09 DIAGNOSIS — I10 ESSENTIAL HYPERTENSION: Primary | ICD-10-CM

## 2024-05-09 NOTE — PROGRESS NOTES
Presents today for BP check, brought home logs, which were reviewed.   @ last nurse visit her amlodipine was increased to 5mg daily, along with lisinopril 20mg daily. Has been having diverticulitis issues and abdominal pain, she will see dr. Jain for that today. Spoke to Dr. Landeros and instructed to continue the same doses, continue to monitor BP until upcoming appt in 2 weeks. Pt voiced understanding.

## 2024-05-15 ENCOUNTER — LAB VISIT (OUTPATIENT)
Dept: LAB | Facility: HOSPITAL | Age: 79
End: 2024-05-15
Attending: INTERNAL MEDICINE
Payer: COMMERCIAL

## 2024-05-15 DIAGNOSIS — R10.31 ABDOMINAL PAIN, RIGHT LOWER QUADRANT: ICD-10-CM

## 2024-05-15 DIAGNOSIS — R19.7 DIARRHEA OF PRESUMED INFECTIOUS ORIGIN: ICD-10-CM

## 2024-05-15 DIAGNOSIS — R10.31 ABDOMINAL PAIN, RIGHT LOWER QUADRANT: Primary | ICD-10-CM

## 2024-05-15 LAB — WBC #/AREA STL HPF: NORMAL /[HPF]

## 2024-05-15 PROCEDURE — 89055 LEUKOCYTE ASSESSMENT FECAL: CPT | Performed by: INTERNAL MEDICINE

## 2024-05-15 PROCEDURE — 82653 EL-1 FECAL QUANTITATIVE: CPT | Performed by: INTERNAL MEDICINE

## 2024-05-20 LAB — ELASTASE PANC STL-MCNT: 218 UG ELAST./G

## 2024-05-23 DIAGNOSIS — E78.00 HIGH CHOLESTEROL: ICD-10-CM

## 2024-05-23 RX ORDER — ROSUVASTATIN CALCIUM 10 MG/1
10 TABLET, COATED ORAL DAILY
Qty: 90 TABLET | Refills: 3 | Status: SHIPPED | OUTPATIENT
Start: 2024-05-23

## 2024-05-23 NOTE — TELEPHONE ENCOUNTER
----- Message from Shabana Hill sent at 5/23/2024  8:42 AM CDT -----  Patient brought by an envelope for Dr Landeros.  Gave to [place]

## 2024-05-24 LAB
CHOLEST SERPL-MCNC: NORMAL MG/DL
CHOLEST/HDLC SERPL: NORMAL {RATIO}
HDLC SERPL-MCNC: NORMAL MG/DL
LDLC SERPL CALC-MCNC: NORMAL MG/DL
NONHDLC SERPL-MCNC: NORMAL MG/DL
TRIGL SERPL-MCNC: NORMAL MG/DL
TSH SERPL-ACNC: 1.25 MIU/L (ref 0.4–4.5)

## 2024-05-29 ENCOUNTER — OFFICE VISIT (OUTPATIENT)
Dept: FAMILY MEDICINE | Facility: CLINIC | Age: 79
End: 2024-05-29
Attending: FAMILY MEDICINE
Payer: COMMERCIAL

## 2024-05-29 VITALS
WEIGHT: 143 LBS | HEART RATE: 66 BPM | BODY MASS INDEX: 25.34 KG/M2 | DIASTOLIC BLOOD PRESSURE: 60 MMHG | HEIGHT: 63 IN | SYSTOLIC BLOOD PRESSURE: 134 MMHG

## 2024-05-29 DIAGNOSIS — M51.9 LUMBAR DISC DISEASE: ICD-10-CM

## 2024-05-29 DIAGNOSIS — K21.9 GASTROESOPHAGEAL REFLUX DISEASE WITHOUT ESOPHAGITIS: ICD-10-CM

## 2024-05-29 DIAGNOSIS — Z78.0 MENOPAUSE: ICD-10-CM

## 2024-05-29 DIAGNOSIS — E78.00 HIGH CHOLESTEROL: ICD-10-CM

## 2024-05-29 DIAGNOSIS — R10.31 RIGHT LOWER QUADRANT ABDOMINAL PAIN: Primary | ICD-10-CM

## 2024-05-29 DIAGNOSIS — N18.31 STAGE 3A CHRONIC KIDNEY DISEASE: ICD-10-CM

## 2024-05-29 DIAGNOSIS — H90.0 CONDUCTIVE HEARING LOSS, BILATERAL: ICD-10-CM

## 2024-05-29 DIAGNOSIS — D69.2 PURPURA SENILIS: ICD-10-CM

## 2024-05-29 DIAGNOSIS — I10 ESSENTIAL HYPERTENSION: ICD-10-CM

## 2024-05-29 DIAGNOSIS — G20.A1 PARKINSON'S DISEASE WITHOUT DYSKINESIA OR FLUCTUATING MANIFESTATIONS: ICD-10-CM

## 2024-05-29 DIAGNOSIS — F31.76 BIPOLAR DISORDER, IN FULL REMISSION, MOST RECENT EPISODE DEPRESSED: ICD-10-CM

## 2024-05-29 PROCEDURE — 99214 OFFICE O/P EST MOD 30 MIN: CPT | Mod: S$GLB,,, | Performed by: FAMILY MEDICINE

## 2024-05-29 PROCEDURE — 1159F MED LIST DOCD IN RCRD: CPT | Mod: CPTII,S$GLB,, | Performed by: FAMILY MEDICINE

## 2024-05-29 PROCEDURE — 3078F DIAST BP <80 MM HG: CPT | Mod: CPTII,S$GLB,, | Performed by: FAMILY MEDICINE

## 2024-05-29 PROCEDURE — 1101F PT FALLS ASSESS-DOCD LE1/YR: CPT | Mod: CPTII,S$GLB,, | Performed by: FAMILY MEDICINE

## 2024-05-29 PROCEDURE — 3075F SYST BP GE 130 - 139MM HG: CPT | Mod: CPTII,S$GLB,, | Performed by: FAMILY MEDICINE

## 2024-05-29 PROCEDURE — 3288F FALL RISK ASSESSMENT DOCD: CPT | Mod: CPTII,S$GLB,, | Performed by: FAMILY MEDICINE

## 2024-05-29 RX ORDER — LITHIUM CARBONATE 150 MG/1
150 CAPSULE ORAL DAILY
Qty: 90 CAPSULE | Refills: 3 | Status: SHIPPED | OUTPATIENT
Start: 2024-05-29 | End: 2024-06-28

## 2024-05-29 RX ORDER — OMEPRAZOLE 20 MG/1
20 CAPSULE, DELAYED RELEASE ORAL DAILY
Qty: 90 CAPSULE | Refills: 3 | Status: SHIPPED | OUTPATIENT
Start: 2024-05-29

## 2024-06-01 NOTE — PROGRESS NOTES
SUBJECTIVE:    Patient ID: Payton Gutierrez is a 78 y.o. female.    Chief Complaint: Hypertension (Brought bottles, need refills, brought [facility] records, diarrhea, upcoming appt with Dr Jain tomorrow, abc)    This 78-year-old female had a 5/2/24 emergency room visit for right lower quadrant abdominal pain and diarrhea.  She feared she had diverticulitis.  She had been taking Augmentin for recent E coli infection.  She then developed diarrhea that took several weeks to resolve.  CT scan of the abdomen and pelvis showed diverticulosis but no diverticulitis.  Her right lower quadrant discomfort persists, however her diarrhea has stopped.  She has eating a regular diet at this time.  No nausea vomiting or fever.    She does have lumbar disc disease, right hip and thigh pains intermittently.  It hurts to garden or pull weeds when bending over frequently.    History of bipolar disease, mild Parkinson's disease which is not very symptomatic.  On lithium daily    Hyperlipidemia-rosuvastatin 10 mg daily working well    Hypertension  Pertinent negatives include no chest pain, palpitations or shortness of breath.       Lab Visit on 05/15/2024   Component Date Value Ref Range Status    Elastase 1, Fecal 05/15/2024 218  >200 ug Elast./g Final    Stool WBC 05/15/2024 No neutrophils seen  No neutrophils seen Final   Telephone on 05/08/2024   Component Date Value Ref Range Status    TSH w/reflex to FT4 05/23/2024 1.25  0.40 - 4.50 mIU/L Final   Admission on 05/02/2024, Discharged on 05/02/2024   Component Date Value Ref Range Status    WBC 05/02/2024 5.04  3.90 - 12.70 K/uL Final    RBC 05/02/2024 4.74  4.00 - 5.40 M/uL Final    Hemoglobin 05/02/2024 14.2  12.0 - 16.0 g/dL Final    Hematocrit 05/02/2024 41.8  37.0 - 48.5 % Final    MCV 05/02/2024 88  82 - 98 fL Final    MCH 05/02/2024 30.0  27.0 - 31.0 pg Final    MCHC 05/02/2024 34.0  32.0 - 36.0 g/dL Final    RDW 05/02/2024 12.7  11.5 - 14.5 % Final    Platelets 05/02/2024 230   150 - 450 K/uL Final    MPV 05/02/2024 10.1  9.2 - 12.9 fL Final    Immature Granulocytes 05/02/2024 0.2  0.0 - 0.5 % Final    Gran # (ANC) 05/02/2024 3.2  1.8 - 7.7 K/uL Final    Immature Grans (Abs) 05/02/2024 0.01  0.00 - 0.04 K/uL Final    Lymph # 05/02/2024 1.2  1.0 - 4.8 K/uL Final    Mono # 05/02/2024 0.4  0.3 - 1.0 K/uL Final    Eos # 05/02/2024 0.2  0.0 - 0.5 K/uL Final    Baso # 05/02/2024 0.06  0.00 - 0.20 K/uL Final    nRBC 05/02/2024 0  0 /100 WBC Final    Gran % 05/02/2024 63.7  38.0 - 73.0 % Final    Lymph % 05/02/2024 23.8  18.0 - 48.0 % Final    Mono % 05/02/2024 8.1  4.0 - 15.0 % Final    Eosinophil % 05/02/2024 3.0  0.0 - 8.0 % Final    Basophil % 05/02/2024 1.2  0.0 - 1.9 % Final    Differential Method 05/02/2024 Automated   Final    Sodium 05/02/2024 142  136 - 145 mmol/L Final    Potassium 05/02/2024 3.6  3.5 - 5.1 mmol/L Final    Chloride 05/02/2024 107  95 - 110 mmol/L Final    CO2 05/02/2024 25  23 - 29 mmol/L Final    Glucose 05/02/2024 93  70 - 110 mg/dL Final    BUN 05/02/2024 21  8 - 23 mg/dL Final    Creatinine 05/02/2024 1.2  0.5 - 1.4 mg/dL Final    Calcium 05/02/2024 10.3  8.7 - 10.5 mg/dL Final    Total Protein 05/02/2024 7.7  6.0 - 8.4 g/dL Final    Albumin 05/02/2024 4.8  3.5 - 5.2 g/dL Final    Total Bilirubin 05/02/2024 0.8  0.1 - 1.0 mg/dL Final    Alkaline Phosphatase 05/02/2024 139 (H)  55 - 135 U/L Final    AST 05/02/2024 15  10 - 40 U/L Final    ALT 05/02/2024 13  10 - 44 U/L Final    eGFR 05/02/2024 45.8 (A)  >60 mL/min/1.73 m^2 Final    Anion Gap 05/02/2024 10  8 - 16 mmol/L Final    Lipase 05/02/2024 26  4 - 60 U/L Final    Specimen UA 05/02/2024 Urine, Clean Catch   Final    Color, UA 05/02/2024 Yellow  Yellow, Straw, Otilia Final    Appearance, UA 05/02/2024 Clear  Clear Final    pH, UA 05/02/2024 6.0  5.0 - 8.0 Final    Specific Gravity, UA 05/02/2024 1.020  1.005 - 1.030 Final    Protein, UA 05/02/2024 Trace (A)  Negative Final    Glucose, UA 05/02/2024 Negative   Negative Final    Ketones, UA 05/02/2024 1+ (A)  Negative Final    Bilirubin (UA) 05/02/2024 Negative  Negative Final    Occult Blood UA 05/02/2024 1+ (A)  Negative Final    Nitrite, UA 05/02/2024 Negative  Negative Final    Urobilinogen, UA 05/02/2024 Negative  Negative EU/dL Final    Leukocytes, UA 05/02/2024 Negative  Negative Final    RBC, UA 05/02/2024 1  0 - 4 /hpf Final    WBC, UA 05/02/2024 2  0 - 5 /hpf Final    Microscopic Comment 05/02/2024 SEE COMMENT   Final   Lab Visit on 03/04/2024   Component Date Value Ref Range Status    Ferritin 03/04/2024 58.5  20.0 - 300.0 ng/mL Final    WBC 03/04/2024 5.50  3.90 - 12.70 K/uL Final    RBC 03/04/2024 4.39  4.00 - 5.40 M/uL Final    Hemoglobin 03/04/2024 13.5  12.0 - 16.0 g/dL Final    Hematocrit 03/04/2024 40.2  37.0 - 48.5 % Final    MCV 03/04/2024 92  82 - 98 fL Final    MCH 03/04/2024 30.8  27.0 - 31.0 pg Final    MCHC 03/04/2024 33.6  32.0 - 36.0 g/dL Final    RDW 03/04/2024 13.0  11.5 - 14.5 % Final    Platelets 03/04/2024 225  150 - 450 K/uL Final    MPV 03/04/2024 10.3  9.2 - 12.9 fL Final    Immature Granulocytes 03/04/2024 0.2  0.0 - 0.5 % Final    Gran # (ANC) 03/04/2024 3.2  1.8 - 7.7 K/uL Final    Immature Grans (Abs) 03/04/2024 0.01  0.00 - 0.04 K/uL Final    Lymph # 03/04/2024 1.5  1.0 - 4.8 K/uL Final    Mono # 03/04/2024 0.5  0.3 - 1.0 K/uL Final    Eos # 03/04/2024 0.2  0.0 - 0.5 K/uL Final    Baso # 03/04/2024 0.07  0.00 - 0.20 K/uL Final    nRBC 03/04/2024 0  0 /100 WBC Final    Gran % 03/04/2024 58.1  38.0 - 73.0 % Final    Lymph % 03/04/2024 27.5  18.0 - 48.0 % Final    Mono % 03/04/2024 9.3  4.0 - 15.0 % Final    Eosinophil % 03/04/2024 3.6  0.0 - 8.0 % Final    Basophil % 03/04/2024 1.3  0.0 - 1.9 % Final    Differential Method 03/04/2024 Automated   Final   Lab Visit on 12/28/2023   Component Date Value Ref Range Status    Glucose 12/28/2023 103  70 - 110 mg/dL Final    Sodium 12/28/2023 141  136 - 145 mmol/L Final    Potassium 12/28/2023  4.2  3.5 - 5.1 mmol/L Final    Chloride 12/28/2023 107  95 - 110 mmol/L Final    CO2 12/28/2023 26  23 - 29 mmol/L Final    BUN 12/28/2023 26 (H)  8 - 23 mg/dL Final    Calcium 12/28/2023 10.0  8.7 - 10.5 mg/dL Final    Creatinine 12/28/2023 1.2  0.5 - 1.4 mg/dL Final    Albumin 12/28/2023 4.2  3.5 - 5.2 g/dL Final    Phosphorus 12/28/2023 3.8  2.7 - 4.5 mg/dL Final    eGFR 12/28/2023 45.8 (A)  >60 mL/min/1.73 m^2 Final    Anion Gap 12/28/2023 8  8 - 16 mmol/L Final    Vit D, 25-Hydroxy 12/28/2023 66  30 - 96 ng/mL Final    Protein, Urine Random 12/28/2023 14  6 - 15 mg/dL Final    Creatinine, Urine 12/28/2023 80.9  15.0 - 325.0 mg/dL Final    Prot/Creat Ratio, Urine 12/28/2023 0.17  0.00 - 0.20 Final    RBC, UA 12/28/2023 0  0 - 4 /hpf Final    WBC, UA 12/28/2023 11 (H)  0 - 5 /hpf Final    Bacteria 12/28/2023 Many (A)  None-Occ /hpf Final    Squam Epithel, UA 12/28/2023 1  /hpf Final    Hyaline Casts, UA 12/28/2023 2 (A)  0-1/lpf /lpf Final    Microscopic Comment 12/28/2023 SEE COMMENT   Final       Past Medical History:   Diagnosis Date    Glaucoma      Social History     Socioeconomic History    Marital status: Single   Tobacco Use    Smoking status: Never    Smokeless tobacco: Never   Substance and Sexual Activity    Alcohol use: No    Drug use: No     Past Surgical History:   Procedure Laterality Date    CATARACT EXTRACTION  2016    HYSTERECTOMY       Family History   Problem Relation Name Age of Onset    Cancer Mother         The CVD Risk score (RAMAN'Edd, et al., 2008) failed to calculate for the following reasons:    The 2008 CVD risk score is only valid for ages 30 to 74    The patient has a prior MI, stroke, CHF, or peripheral vascular disease diagnosis    All of your core healthy metrics are met.      Review of patient's allergies indicates:   Allergen Reactions    Codeine      Other reaction(s): Not available    Sulfa (sulfonamide antibiotics)      Other reaction(s): Not available    Sulfur Other (See  Comments)       Current Outpatient Medications:     amLODIPine (NORVASC) 5 MG tablet, Take 1 tablet (5 mg total) by mouth nightly., Disp: 90 tablet, Rfl: 3    ergocalciferol, vitamin D2, (VITAMIN D ORAL), Take 1 tablet by mouth once daily., Disp: , Rfl:     lisinopriL (PRINIVIL,ZESTRIL) 20 MG tablet, Take 1 tablet by mouth once daily., Disp: , Rfl:     LUMIGAN 0.01 % Drop, Place 1 drop into both eyes every evening., Disp: , Rfl:     nitrofurantoin, macrocrystal-monohydrate, (MACROBID) 100 MG capsule, Take 1 capsule (100 mg total) by mouth 2 (two) times daily., Disp: 20 capsule, Rfl: 0    rosuvastatin (CRESTOR) 10 MG tablet, Take 1 tablet (10 mg total) by mouth once daily., Disp: 90 tablet, Rfl: 3    diclofenac sodium (VOLTAREN) 1 % Gel, Apply 2 g topically once daily. for 10 days, Disp: 450 g, Rfl: 0    lithium carbonate 150 MG capsule, Take 1 capsule (150 mg total) by mouth once daily., Disp: 90 capsule, Rfl: 3    omeprazole (PRILOSEC) 20 MG capsule, Take 1 capsule (20 mg total) by mouth once daily., Disp: 90 capsule, Rfl: 3    Review of Systems   Constitutional:  Negative for appetite change, chills, fatigue, fever and unexpected weight change.   HENT:  Negative for ear discharge and ear pain.    Eyes:  Negative for pain, discharge and visual disturbance.   Respiratory:  Negative for apnea, cough, shortness of breath and wheezing.    Cardiovascular:  Negative for chest pain, palpitations and leg swelling.   Gastrointestinal:  Positive for abdominal pain (right lower quadrant discomfort) and diarrhea. Negative for blood in stool, constipation, nausea, vomiting and reflux.   Endocrine: Negative for cold intolerance, heat intolerance and polydipsia.   Genitourinary:  Negative for bladder incontinence, dysuria, hematuria, nocturia and urgency.   Musculoskeletal:  Negative for gait problem, joint swelling and myalgias.   Neurological:  Negative for dizziness, seizures and numbness.   Psychiatric/Behavioral:  Negative  "for behavioral problems and hallucinations. The patient is not nervous/anxious.            Objective:      Vitals:    05/29/24 1133   BP: 134/60   Pulse: 66   Weight: 64.9 kg (143 lb)   Height: 5' 3" (1.6 m)     Physical Exam  Vitals and nursing note reviewed.   Constitutional:       General: She is not in acute distress.     Appearance: Normal appearance. She is well-developed. She is not ill-appearing or toxic-appearing.   HENT:      Head: Normocephalic and atraumatic.      Right Ear: Tympanic membrane and external ear normal.      Left Ear: Tympanic membrane and external ear normal.      Nose: Nose normal.      Mouth/Throat:      Pharynx: Oropharynx is clear.   Eyes:      Pupils: Pupils are equal, round, and reactive to light.   Neck:      Thyroid: No thyromegaly.      Vascular: No carotid bruit.   Cardiovascular:      Rate and Rhythm: Normal rate and regular rhythm.      Heart sounds: Normal heart sounds. No murmur heard.  Pulmonary:      Effort: Pulmonary effort is normal.      Breath sounds: Normal breath sounds. No wheezing or rales.   Abdominal:      General: Bowel sounds are normal. There is no distension.      Palpations: Abdomen is soft. There is no mass.      Tenderness: There is abdominal tenderness (right lower quadrant minimal tenderness). There is no guarding or rebound.      Hernia: No hernia is present.   Musculoskeletal:         General: No tenderness or deformity. Normal range of motion.      Cervical back: Normal range of motion and neck supple.      Lumbar back: Normal. No spasms.      Comments: Bends 90 degrees at  waist shoulders and knees good range of motion without crepitance.  No pitting edema to lower extremities   Lymphadenopathy:      Cervical: No cervical adenopathy.   Skin:     General: Skin is warm and dry.      Findings: No rash.   Neurological:      Mental Status: She is alert and oriented to person, place, and time. Mental status is at baseline.      Cranial Nerves: No cranial " nerve deficit.      Coordination: Coordination normal.      Gait: Gait normal.      Deep Tendon Reflexes: Reflexes normal.   Psychiatric:         Mood and Affect: Mood normal.         Behavior: Behavior normal.         Thought Content: Thought content normal.         Judgment: Judgment normal.           Assessment:       1. Right lower quadrant abdominal pain    2. Bipolar disorder, in full remission, most recent episode depressed    3. Gastroesophageal reflux disease without esophagitis    4. Parkinson's disease without dyskinesia or fluctuating manifestations    5. Lumbar disc disease    6. Conductive hearing loss, bilateral    7. High cholesterol    8. Essential hypertension    9. Menopause    10. Stage 3a chronic kidney disease    11. Purpura senilis         Plan:       Right lower quadrant abdominal pain  ER workup was unremarkable, CT scan shows no acute diverticulitis., she has appointment to see Dr. Jain tomorrow to discuss this discomfort  Bipolar disorder, in full remission, most recent episode depressed  -     lithium carbonate 150 MG capsule; Take 1 capsule (150 mg total) by mouth once daily.  Dispense: 90 capsule; Refill: 3  Stable on lithium daily  Gastroesophageal reflux disease without esophagitis  -     omeprazole (PRILOSEC) 20 MG capsule; Take 1 capsule (20 mg total) by mouth once daily.  Dispense: 90 capsule; Refill: 3  Continue omeprazole  Parkinson's disease without dyskinesia or fluctuating manifestations  Doing well with Parkinson  Lumbar disc disease  She may have some radiculopathy from her lumbar disc disease causing this right lower quadrant discomfort, lumbar x-rays offered, she will think about this and let us know.  Conductive hearing loss, bilateral    High cholesterol  Cholesterol 171 HDL 63 LDL 89 TG 94 excellent lipid panel  Essential hypertension    Menopause    Stage 3a chronic kidney disease  Continue adequate hydration  Purpura senilis      Follow up in about 6 months  (around 11/29/2024).        6/1/2024 Max Landeros

## 2024-08-13 ENCOUNTER — CLINICAL SUPPORT (OUTPATIENT)
Dept: FAMILY MEDICINE | Facility: CLINIC | Age: 79
End: 2024-08-13
Payer: COMMERCIAL

## 2024-08-13 ENCOUNTER — TELEPHONE (OUTPATIENT)
Dept: FAMILY MEDICINE | Facility: CLINIC | Age: 79
End: 2024-08-13

## 2024-08-13 VITALS — DIASTOLIC BLOOD PRESSURE: 70 MMHG | SYSTOLIC BLOOD PRESSURE: 146 MMHG

## 2024-08-13 DIAGNOSIS — I10 ESSENTIAL HYPERTENSION: Primary | ICD-10-CM

## 2024-08-13 DIAGNOSIS — E78.00 HIGH CHOLESTEROL: Primary | ICD-10-CM

## 2024-08-13 DIAGNOSIS — I10 ESSENTIAL HYPERTENSION: ICD-10-CM

## 2024-08-13 NOTE — TELEPHONE ENCOUNTER
Spoke with patient who states she saw her eye dr, Dr. Carlos Hernandez in [place], and he is recommending she see a cardiologist. She just wanted to know who Dr. Landeros recommends, she has never seen a cardiologist before.

## 2024-08-13 NOTE — TELEPHONE ENCOUNTER
"Per Dr. Landeros "Okay to refer to Dr. Beach for cardiology. Increase Amlodipine to 5mg twice daily." LMOR for patient to call back.   "

## 2024-08-13 NOTE — TELEPHONE ENCOUNTER
----- Message from Aleah Levin sent at 8/13/2024  8:50 AM CDT -----  Regarding: PT IS HERE  PT IS HERE  in waiting room wants a recommendation for cardilogist  per [facility]   wants to wait to speak with the nurse THANKS roger

## 2024-08-14 RX ORDER — AMLODIPINE BESYLATE 5 MG/1
5 TABLET ORAL 2 TIMES DAILY
COMMUNITY

## 2024-08-14 NOTE — TELEPHONE ENCOUNTER
----- Message from Dotty Garnett sent at 8/14/2024  9:29 AM CDT -----  - 8:31- pt is calling back   [phone]

## 2024-08-14 NOTE — TELEPHONE ENCOUNTER
----- Message from Dotty Garnett sent at 8/14/2024  1:48 PM CDT -----  Vm- 1:06-pt would like to know if we found her a cardiologist and what the name is   [phone]

## 2024-08-14 NOTE — TELEPHONE ENCOUNTER
Spoke with patient and let her know Dr Beach's name and number. She agrees to increase the Amlodipine to 5mg twice daily.

## 2024-09-03 ENCOUNTER — LAB VISIT (OUTPATIENT)
Dept: LAB | Facility: HOSPITAL | Age: 79
End: 2024-09-03
Attending: INTERNAL MEDICINE
Payer: COMMERCIAL

## 2024-09-03 DIAGNOSIS — R79.89 ELEVATED FERRITIN: ICD-10-CM

## 2024-09-03 LAB
BASOPHILS # BLD AUTO: 0.07 K/UL (ref 0–0.2)
BASOPHILS NFR BLD: 1.2 % (ref 0–1.9)
DIFFERENTIAL METHOD BLD: NORMAL
EOSINOPHIL # BLD AUTO: 0.3 K/UL (ref 0–0.5)
EOSINOPHIL NFR BLD: 4.5 % (ref 0–8)
ERYTHROCYTE [DISTWIDTH] IN BLOOD BY AUTOMATED COUNT: 13 % (ref 11.5–14.5)
FERRITIN SERPL-MCNC: 80.2 NG/ML (ref 20–300)
HCT VFR BLD AUTO: 39.1 % (ref 37–48.5)
HGB BLD-MCNC: 13.2 G/DL (ref 12–16)
IMM GRANULOCYTES # BLD AUTO: 0.01 K/UL (ref 0–0.04)
IMM GRANULOCYTES NFR BLD AUTO: 0.2 % (ref 0–0.5)
LYMPHOCYTES # BLD AUTO: 1.6 K/UL (ref 1–4.8)
LYMPHOCYTES NFR BLD: 26.6 % (ref 18–48)
MCH RBC QN AUTO: 30.8 PG (ref 27–31)
MCHC RBC AUTO-ENTMCNC: 33.8 G/DL (ref 32–36)
MCV RBC AUTO: 91 FL (ref 82–98)
MONOCYTES # BLD AUTO: 0.5 K/UL (ref 0.3–1)
MONOCYTES NFR BLD: 7.9 % (ref 4–15)
NEUTROPHILS # BLD AUTO: 3.5 K/UL (ref 1.8–7.7)
NEUTROPHILS NFR BLD: 59.6 % (ref 38–73)
NRBC BLD-RTO: 0 /100 WBC
PLATELET # BLD AUTO: 260 K/UL (ref 150–450)
PMV BLD AUTO: 10.3 FL (ref 9.2–12.9)
RBC # BLD AUTO: 4.28 M/UL (ref 4–5.4)
WBC # BLD AUTO: 5.82 K/UL (ref 3.9–12.7)

## 2024-09-03 PROCEDURE — 36415 COLL VENOUS BLD VENIPUNCTURE: CPT | Performed by: INTERNAL MEDICINE

## 2024-09-03 PROCEDURE — 85025 COMPLETE CBC W/AUTO DIFF WBC: CPT | Performed by: INTERNAL MEDICINE

## 2024-09-03 PROCEDURE — 82728 ASSAY OF FERRITIN: CPT | Performed by: INTERNAL MEDICINE

## 2024-09-09 ENCOUNTER — OFFICE VISIT (OUTPATIENT)
Dept: CARDIOLOGY | Facility: CLINIC | Age: 79
End: 2024-09-09
Payer: COMMERCIAL

## 2024-09-09 VITALS
HEIGHT: 63 IN | DIASTOLIC BLOOD PRESSURE: 60 MMHG | BODY MASS INDEX: 25.8 KG/M2 | HEART RATE: 74 BPM | WEIGHT: 145.63 LBS | SYSTOLIC BLOOD PRESSURE: 140 MMHG

## 2024-09-09 DIAGNOSIS — I49.3 PREMATURE VENTRICULAR COMPLEX: ICD-10-CM

## 2024-09-09 DIAGNOSIS — H53.9 VISION ABNORMALITIES: Primary | ICD-10-CM

## 2024-09-09 DIAGNOSIS — I10 ESSENTIAL HYPERTENSION: ICD-10-CM

## 2024-09-09 PROCEDURE — 99999 PR PBB SHADOW E&M-EST. PATIENT-LVL V: CPT | Mod: PBBFAC,,, | Performed by: STUDENT IN AN ORGANIZED HEALTH CARE EDUCATION/TRAINING PROGRAM

## 2024-09-09 PROCEDURE — 93000 ELECTROCARDIOGRAM COMPLETE: CPT | Mod: S$GLB,,, | Performed by: INTERNAL MEDICINE

## 2024-09-09 RX ORDER — LISINOPRIL 20 MG/1
20 TABLET ORAL DAILY
Qty: 90 TABLET | Refills: 3 | Status: SHIPPED | OUTPATIENT
Start: 2024-09-09

## 2024-09-09 NOTE — TELEPHONE ENCOUNTER
----- Message from Dotty Garnett sent at 9/9/2024  2:49 PM CDT -----  Vm- 2:07- pt needs refill on lisinopril   [facility]   [phone]

## 2024-09-11 ENCOUNTER — OFFICE VISIT (OUTPATIENT)
Facility: CLINIC | Age: 79
End: 2024-09-11
Payer: COMMERCIAL

## 2024-09-11 VITALS
TEMPERATURE: 98 F | RESPIRATION RATE: 18 BRPM | WEIGHT: 146 LBS | HEIGHT: 63 IN | HEART RATE: 80 BPM | DIASTOLIC BLOOD PRESSURE: 62 MMHG | SYSTOLIC BLOOD PRESSURE: 163 MMHG | BODY MASS INDEX: 25.87 KG/M2

## 2024-09-11 DIAGNOSIS — E83.118 OTHER HEMOCHROMATOSIS: Primary | ICD-10-CM

## 2024-09-11 PROCEDURE — 3077F SYST BP >= 140 MM HG: CPT | Mod: CPTII,S$GLB,, | Performed by: INTERNAL MEDICINE

## 2024-09-11 PROCEDURE — 1101F PT FALLS ASSESS-DOCD LE1/YR: CPT | Mod: CPTII,S$GLB,, | Performed by: INTERNAL MEDICINE

## 2024-09-11 PROCEDURE — 3078F DIAST BP <80 MM HG: CPT | Mod: CPTII,S$GLB,, | Performed by: INTERNAL MEDICINE

## 2024-09-11 PROCEDURE — 1126F AMNT PAIN NOTED NONE PRSNT: CPT | Mod: CPTII,S$GLB,, | Performed by: INTERNAL MEDICINE

## 2024-09-11 PROCEDURE — 99999 PR PBB SHADOW E&M-EST. PATIENT-LVL III: CPT | Mod: PBBFAC,,, | Performed by: INTERNAL MEDICINE

## 2024-09-11 PROCEDURE — 99215 OFFICE O/P EST HI 40 MIN: CPT | Mod: S$GLB,,, | Performed by: INTERNAL MEDICINE

## 2024-09-11 PROCEDURE — 3288F FALL RISK ASSESSMENT DOCD: CPT | Mod: CPTII,S$GLB,, | Performed by: INTERNAL MEDICINE

## 2024-09-11 PROCEDURE — G2211 COMPLEX E/M VISIT ADD ON: HCPCS | Mod: S$GLB,,, | Performed by: INTERNAL MEDICINE

## 2024-09-11 PROCEDURE — 1159F MED LIST DOCD IN RCRD: CPT | Mod: CPTII,S$GLB,, | Performed by: INTERNAL MEDICINE

## 2024-09-11 NOTE — PROGRESS NOTES
[facility] [address] hematology oncology Subsequent encounter note    9/11/24    Subjective:      Patient ID:   Payton Gutierrez  78 y.o. female  1945  [place]      Chief Complaint:   Iron disorder follow up    HPI:  78 y.o. female with diagnosis of increased iron stores, hemochromatosis treated with intermittent phlebotomy in the past.  Phlebotomy is on hold. Her last therapeutic phlebotomy   was 5 years  Ago.    Ferritin went from 132 to 206.  Hgb 13.6.  Resumed phlebotomy schedule, orders written.    Ferritin came down to  32, and has gradually increased to 87 over 6 months.  Hgb 13.2, observe for now.  Plan to resume phlebotomy at 200.  Recent ferritin stable at 58, Hgb 13.5.    Recent lab work shows that her hemoglobin is 13.2 and her ferritin is stable at 80 with observation.  She does not require therapeutic phlebotomy at this time.  She will get a CBC and ferritin done at [facility] in 6 months.    In May 20, 2024 she had diarrhea for 2 or 3 weeks.  She felt this was probably diverticulitis, she has had diverticulitis on several occasions before.  She had right lower quadrant abdominal pain.  She saw Dr. Jain.  CT scan of the abdomen and pelvis was negative for diverticulitis.  She tells me that she was found to have an E coli infection and treated per Dr. Jain.  GI symptoms have resolved.      Mammogram from January 20, 2024 was negative for malignancy.      She saw Dr. Beach of Cardiology earlier this month.  She does node where she had partial loss symptoms he is cardia radiation to include of the carotid arteries, I believe she is getting an echocardiogram, and is to have a monitoring device placed to evaluate her I suspect for atrial fibrillation or other arrhythmia.    I have asked her to begin aspirin 81 mg 1 p.o. daily as prophylaxis to reduce her risk of recurrent eye symptoms or TIA.  I have informed her that if she develops I symptoms again or focal neurological symptoms again, that she is  not to wait around at home but is to go to the emergency room promptly so that she can be evaluated for tPA administration to revolve her symptoms and prevent permanent neurological damage or visual loss?    She has not had covid 19 infection.  She did take 2/2 vaccines.  She is undecided if she will take the booster vaccine.    Had recent episode of diverticulitis, being evaluated for surgery is sx recur.  R hip pain sx, DDD.    She has history of GERD, glaucoma, hypercholesterolemia,hypertension, and has been diagnosed with Parkinson's disease.  She sees Dr. Spencer.  He tells me her Parkinson's symptoms have resolved, and she is off Parkinson medications.  Parkinson's Dx ?    She is status post tonsillectomy, appendectomy, complete hysterectomy.    She is a retired , she is allergic to codeine. She does not smoke, she does not drink alcohol with regularity.    Her dad passed away of a motor vehicle accident and her mother  of vaginal cancer. She has a brother with the hemochromatosis gene and increased ferritin on intermittent phlebotomy.    She denies gallbladder disease jaundice or hepatitis.  She is on lithium.  She is seeing Dr. Watts for renal dx.  She recently had diverticulitis.    2nd cousin Adry STARKS.    ROS:   GEN: normal without any fever, night sweats or weight loss  HEENT: normal with no HA's, sore throat, stiff neck, changes in vision  CV: normal with no CP, SOB, PND, SAUNDERS or orthopnea  PULM: normal with no SOB, cough, hemoptysis, sputum or pleuritic pain  GI: normal with no abdominal pain, nausea, vomiting, constipation, diarrhea, melanotic stools, BRBPR, or hematemesis  : normal with no hematuria, dysuria  BREAST: normal with no mass, discharge, pain  SKIN: normal with no rash, erythema, bruising, or swelling     Past Medical History:   Diagnosis Date    Glaucoma     Hyperlipidemia     Hypertension      Past Surgical History:   Procedure Laterality Date    CATARACT EXTRACTION   "2016    HYSTERECTOMY         Review of patient's allergies indicates:   Allergen Reactions    Codeine     Sulfa (sulfonamide antibiotics)            Current Outpatient Medications:     amLODIPine (NORVASC) 5 MG tablet, Take 5 mg by mouth 2 (two) times daily., Disp: , Rfl:     lisinopriL (PRINIVIL,ZESTRIL) 20 MG tablet, Take 1 tablet (20 mg total) by mouth once daily., Disp: 90 tablet, Rfl: 3    LUMIGAN 0.01 % Drop, Place 1 drop into both eyes every evening., Disp: , Rfl:     omeprazole (PRILOSEC) 20 MG capsule, Take 1 capsule (20 mg total) by mouth once daily., Disp: 90 capsule, Rfl: 3    rosuvastatin (CRESTOR) 10 MG tablet, Take 1 tablet (10 mg total) by mouth once daily., Disp: 90 tablet, Rfl: 3    diclofenac sodium (VOLTAREN) 1 % Gel, Apply 2 g topically once daily. for 10 days, Disp: 450 g, Rfl: 0    ergocalciferol, vitamin D2, (VITAMIN D ORAL), Take 1 tablet by mouth once daily. (Patient not taking: Reported on 9/9/2024), Disp: , Rfl:     lithium carbonate 150 MG capsule, Take 1 capsule (150 mg total) by mouth once daily., Disp: 90 capsule, Rfl: 3          Objective:   Vitals:  Blood pressure (!) 163/62, pulse 80, temperature 97.7 °F (36.5 °C), resp. rate 18, height 5' 3" (1.6 m), weight 66.2 kg (146 lb).    Physical Examination:   GEN: no apparent distress, comfortable  HEAD: atraumatic and normocephalic  EYES: no pallor, no icterus  ENT: OMM, no pharyngeal erythema, external ears WNL; no nasal discharge; no thrush  NECK: no masses, thyroid normal, trachea midline, no LAD/LN's, supple  CV: RRR with no murmur; normal pulse; normal S1 and S2; no pedal edema  CHEST: Normal respiratory effort; CTAB; normal breath sounds; no wheeze or crackles  ABDOM: nontender and nondistended; soft;  no rebound/guarding, the liver and spleen are not palpable  MUSC/Skeletal: ROM normal; no crepitus; joints normal  EXTREM: no clubbing, cyanosis, inflammation or swelling  SKIN: no rashes, lesions, ulcers, petechia  : no " cvat  NEURO: grossly intact; motor/sensory WNL;  no tremors  PSYCH: normal mood, affect and behavior  LYMPH: normal cervical, supraclavicular, axillary and groin LN's  BREASTS:she left the bra on, I did not examine the breasts      Dr. Jain did colonoscopy on August 17, 2017, diverticular disease were found, biopsy was negative for colitis.  Hgb 13.5, ferritin 58 now.    Mamm 1/22/24 negative.    Assessment:   (1) 78 y.o. female with diagnosis of increased iron stores, or hemochromatosis. She is status post intermittent phlebotomy in the past. Ferritin level is now 80. Observe for now.  Plan to resume phlebotomy at ferritin of 200.    (2)other history includes hypertension, hypercholesterolemia, she will follow-up with Dr. Landeros for primary care.     (3)She has recently been diagnosed with Parkinson's disease? and Dr. Chapa for renal.    (4) recent partial visual loss x2 hours.  Suspect embolic phenomena affecting her vision.  Dr. Beach is doing cardiac evaluation to include ultrasound of the carotids, echocardiogram, monitoring studies for arrhythmia.    I have asked her to begin aspirin 81 mg 1 p.o. daily as prophylaxis to reduce the risk of recurrent embolic TIA, CVA, or visual loss symptoms.    She returns here in 6 months with a CBC and ferritin at [facility].

## 2024-09-12 ENCOUNTER — HOSPITAL ENCOUNTER (OUTPATIENT)
Dept: RADIOLOGY | Facility: HOSPITAL | Age: 79
Discharge: HOME | End: 2024-09-12
Attending: STUDENT IN AN ORGANIZED HEALTH CARE EDUCATION/TRAINING PROGRAM
Payer: COMMERCIAL

## 2024-09-12 DIAGNOSIS — H53.9 VISION ABNORMALITIES: ICD-10-CM

## 2024-09-12 PROCEDURE — 93880 EXTRACRANIAL BILAT STUDY: CPT | Mod: TC

## 2024-09-12 PROCEDURE — 93880 EXTRACRANIAL BILAT STUDY: CPT | Mod: 26,,, | Performed by: RADIOLOGY

## 2024-09-16 ENCOUNTER — HOSPITAL ENCOUNTER (OUTPATIENT)
Dept: CARDIOLOGY | Facility: CLINIC | Age: 79
Discharge: HOME | End: 2024-09-16
Attending: STUDENT IN AN ORGANIZED HEALTH CARE EDUCATION/TRAINING PROGRAM
Payer: COMMERCIAL

## 2024-09-16 DIAGNOSIS — H53.9 VISION ABNORMALITIES: ICD-10-CM

## 2024-09-16 DIAGNOSIS — I49.3 PREMATURE VENTRICULAR COMPLEX: ICD-10-CM

## 2024-09-24 LAB
OHS QRS DURATION: 76 MS
OHS QTC CALCULATION: 426 MS

## 2024-09-25 DIAGNOSIS — Z78.0 MENOPAUSE: ICD-10-CM

## 2024-09-26 RX ORDER — AMLODIPINE BESYLATE 5 MG/1
5 TABLET ORAL 2 TIMES DAILY
Qty: 180 TABLET | Refills: 1 | Status: SHIPPED | OUTPATIENT
Start: 2024-09-26

## 2024-09-26 NOTE — TELEPHONE ENCOUNTER
----- Message from Rosa Isela Cole sent at 9/26/2024  3:49 PM CDT -----  Vm: 346    Pt needs a new prescription for amlodipine.     [phone]

## 2024-09-30 ENCOUNTER — TELEPHONE (OUTPATIENT)
Dept: CARDIOLOGY | Facility: CLINIC | Age: 79
End: 2024-09-30
Payer: COMMERCIAL

## 2024-09-30 NOTE — TELEPHONE ENCOUNTER
----- Message from Oksana sent at 9/30/2024 11:03 AM CDT -----  PATIENT WOULD LIKE SOMEONE TO CALL HER ABOUT THE RESULTS OF THE TEST SHE HAD ON ULTRASOUND SHE DOES NOT DO THE MY CHART  RESULTS...  PHONE NUMBER [phone]. NO ONE CALLED FROM THE OFFICE OF NEURO DOCTOR SHE WAS REFERRED TO..

## 2024-10-03 NOTE — PROGRESS NOTES
Patient ID:  Payton Gutierrez  79 y.o.  female      Assessment:       1. Vision abnormalities    2. Essential hypertension    3. Premature ventricular complex           Plan:     She had 1 episode of transient vision change.  Her description of it is quite unusual and does not fit anything specific.  Has not had recurrence. Will give referral to Neurology.  Will obtain arterial Doppler.  Instructed her to go to the ER immediately if similar symptom arise.  EKG today shows sinus rhythm with 1 isolated PVC.  ZIO patch to evaluate PVC burden and arrhythmia.  Does not want to get an echo just yet.  Blood pressure is elevated in the office today.  She says home BP is 110s to 120s/60s.  Does not want to start any new medication.  Continue amlodipine 5 mg daily and lisinopril 20 mg daily.  To keep home BP log and bring it with her next visit.  Continue Crestor 10 mg daily.    Subjective:     Chief Complaint   Patient presents with    Hypertension    vision problems      Eye dr thinks its not eye related       HPI:  Payton Gutierrez is a 79 y.o. female who presents to [facility] care.  She had an episode of vision abnormality in July.  She was [place] and all of a sudden she could not see the left side of the screen.  Was only the screen that she had the problem with but not the surroundings which is unusual.  Denies any other symptoms.  She did not seek any immediate medical attention.  It resolved on its own after while.  Has not had recurrence.  She saw an ophthalmologist outpatient who does not think it is intrinsically eye related recommended other investigation. Reports no chest pain, dyspnea, orthopnea, PND, swelling of extremities, palpitations, syncope or near syncope.  EKGs today shows sinus rhythm and an isolated PVC.  Otherwise normal.    Most Recent EKG Results  Results for orders placed or performed in visit on 09/09/24   IN OFFICE EKG 12-LEAD (to [place])    Collection Time: 09/09/24 10:03 AM   Result  Value Ref Range    QRS Duration 76 ms    [facility]HS QTC Calculation 426 ms    Narrative    Test Reason : I10,    Vent. Rate : 074 BPM     Atrial Rate : 074 BPM     P-R Int : 162 ms          QRS Dur : 076 ms      QT Int : 384 ms       P-R-T Axes : 060 065 070 degrees     QTc Int : 426 ms    Sinus rhythm with occasional Premature ventricular complexes  Otherwise normal ECG  No previous ECGs available  Confirmed by Brett Mario MD ([id number]) on 9/24/2024 7:35:43 PM    Referred By: ALBERTO DUKE           Confirmed By:Brett Mario MD       The ASCVD Risk score (Mine ARGUELLES, et al., 2019) failed to calculate for the following reasons:    The 2019 ASCVD risk score is only valid for ages 40 to 79    Review of patient's allergies indicates:   Allergen Reactions    Codeine      Other reaction(s): Not available    Sulfa (sulfonamide antibiotics)      Other reaction(s): Not available    Sulfur Other (See Comments)       Past Medical History:   Diagnosis Date    Glaucoma     Hyperlipidemia     Hypertension      Past Surgical History:   Procedure Laterality Date    CATARACT EXTRACTION  2016    HYSTERECTOMY       Social History     Tobacco Use    Smoking status: Never    Smokeless tobacco: Never   Substance Use Topics    Alcohol use: No    Drug use: No          REVIEW OF SYSTEMS  CONSTITUTIONAL: No chills, no fatigue, no fever.   EYES: No double vision, no blurred vision  NEURO: No headaches, no dizziness  RESPIRATORY: No cough, no wheezing.    CARDIOVASCULAR: See HPI   GI: No abdominal pain, no melena, no diarrhea, no nausea or vomiting.   : No  dysuria and frequency, no hematuria  SKIN: no bruising, no discoloration  ENDOCRINE: no polyphagia, no heat intolerance, no cold intolerance  PSYCHIATRIC: no depression, no anxiety, no memory loss  MUSCULOSKELETAL: no  neck pain, no muscle weakness,no back pain          Objective:        Vitals:    09/09/24 1000   BP: (!) 140/60   Pulse: 74       PHYSICAL EXAM  CONSTITUTIONAL: In no  "apparent distress  HEENT: Normocephalic. Pupils normal and conjunctivae normal. No pallor  NECK: No JVD  LUNGS: B/L air entry to the lungs, clear to auscultation. No rales, wheezing or rhonchi.  HEART: Normal rate and regular rhythm. Normal S1 and S2.  No murmur   ABDOMEN: soft, non-tender; bowel sounds normal  EXTREMITIES: No edema. Good palpable distal pulses.  NEURO: AAO X 3, no gross sensory or motor deficits  SKIN:  No rash  Psych:  Normal affect    Lab Results   Component Value Date    WBC 5.82 09/03/2024    HGB 13.2 09/03/2024    HCT 39.1 09/03/2024     09/03/2024    CHOL 171 11/06/2023    TRIG 94 11/06/2023    HDL 63 11/06/2023    ALT 13 05/02/2024    AST 15 05/02/2024     05/02/2024    K 3.6 05/02/2024     05/02/2024    CREATININE 1.2 05/02/2024    BUN 21 05/02/2024    CO2 25 05/02/2024    TSH 1.40 05/02/2022    MICROALBUR 0.9 05/05/2023        @  Lab Results   Component Value Date    CHOL 171 11/06/2023    CHOL 144 05/05/2023    CHOL 169 05/02/2022     Lab Results   Component Value Date    HDL 63 11/06/2023    HDL 56 05/05/2023    HDL 60 05/02/2022     Lab Results   Component Value Date    LDLCALC 89 11/06/2023    LDLCALC 72 05/05/2023    LDLCALC 87 05/02/2022     No results found for: "DLDL"  Lab Results   Component Value Date    TRIG 94 11/06/2023    TRIG 82 05/05/2023    TRIG 128 05/02/2022       f1   Lab Results   Component Value Date    CHOLHDL 2.7 11/06/2023    CHOLHDL 2.6 05/05/2023    CHOLHDL 2.8 05/02/2022            Current Outpatient Medications   Medication Instructions    amLODIPine (NORVASC) 5 mg, Oral, 2 times daily    diclofenac sodium (VOLTAREN) 2 g, Topical (Top), Daily    ergocalciferol, vitamin D2, (VITAMIN D ORAL) 1 tablet, Daily    lisinopriL (PRINIVIL,ZESTRIL) 20 mg, Oral, Daily    lithium carbonate 150 mg, Oral, Daily    LUMIGAN 0.01 % Drop 1 drop, Both Eyes, Nightly    omeprazole (PRILOSEC) 20 mg, Oral, Daily    rosuvastatin (CRESTOR) 10 mg, Oral, Daily "       Medication List with Changes/Refills   Current Medications    DICLOFENAC SODIUM (VOLTAREN) 1 % GEL    Apply 2 g topically once daily. for 10 days    ERGOCALCIFEROL, VITAMIN D2, (VITAMIN D ORAL)    Take 1 tablet by mouth once daily.    LITHIUM CARBONATE 150 MG CAPSULE    Take 1 capsule (150 mg total) by mouth once daily.    LUMIGAN 0.01 % DROP    Place 1 drop into both eyes every evening.    OMEPRAZOLE (PRILOSEC) 20 MG CAPSULE    Take 1 capsule (20 mg total) by mouth once daily.    ROSUVASTATIN (CRESTOR) 10 MG TABLET    Take 1 tablet (10 mg total) by mouth once daily.   Changed and/or Refilled Medications    Modified Medication Previous Medication    AMLODIPINE (NORVASC) 5 MG TABLET amLODIPine (NORVASC) 5 MG tablet       Take 1 tablet (5 mg total) by mouth 2 (two) times daily.    Take 5 mg by mouth 2 (two) times daily.    LISINOPRIL (PRINIVIL,ZESTRIL) 20 MG TABLET lisinopriL (PRINIVIL,ZESTRIL) 20 MG tablet       Take 1 tablet (20 mg total) by mouth once daily.    Take 1 tablet by mouth once daily.   Discontinued Medications    NITROFURANTOIN, MACROCRYSTAL-MONOHYDRATE, (MACROBID) 100 MG CAPSULE    Take 1 capsule (100 mg total) by mouth 2 (two) times daily.               All pertinent labs, imaging, and EKGs reviewed.  Patient's most recent EKG tracing was personally interpreted by this provider.    Problem List Items Addressed This Visit          Cardiac/Vascular    Essential hypertension    Relevant Orders    IN OFFICE EKG 12-LEAD (to [place]) (Completed)     Other Visit Diagnoses       Vision abnormalities    -  Primary    Relevant Orders    US Carotid Bilateral (Completed)    Cardiac Monitor - 3-15 Day Adult ([facility] Only)    Ambulatory referral/consult to Neurology    Premature ventricular complex        Relevant Orders    Cardiac Monitor - 3-15 Day Adult ([facility] Only)            Follow up in about 6 weeks (around 10/21/2024).

## 2024-10-31 ENCOUNTER — OFFICE VISIT (OUTPATIENT)
Dept: CARDIOLOGY | Facility: CLINIC | Age: 79
End: 2024-10-31
Payer: COMMERCIAL

## 2024-10-31 VITALS
WEIGHT: 150.88 LBS | HEIGHT: 63 IN | DIASTOLIC BLOOD PRESSURE: 60 MMHG | SYSTOLIC BLOOD PRESSURE: 122 MMHG | HEART RATE: 77 BPM | BODY MASS INDEX: 26.73 KG/M2 | OXYGEN SATURATION: 98 %

## 2024-10-31 DIAGNOSIS — Z13.6 ENCOUNTER FOR SCREENING FOR CARDIOVASCULAR DISORDERS: Primary | ICD-10-CM

## 2024-10-31 DIAGNOSIS — I49.3 PVC'S (PREMATURE VENTRICULAR CONTRACTIONS): ICD-10-CM

## 2024-10-31 DIAGNOSIS — I10 PRIMARY HYPERTENSION: ICD-10-CM

## 2024-10-31 PROCEDURE — 3078F DIAST BP <80 MM HG: CPT | Mod: CPTII,S$GLB,, | Performed by: STUDENT IN AN ORGANIZED HEALTH CARE EDUCATION/TRAINING PROGRAM

## 2024-10-31 PROCEDURE — 99999 PR PBB SHADOW E&M-EST. PATIENT-LVL IV: CPT | Mod: PBBFAC,,, | Performed by: STUDENT IN AN ORGANIZED HEALTH CARE EDUCATION/TRAINING PROGRAM

## 2024-10-31 PROCEDURE — 1160F RVW MEDS BY RX/DR IN RCRD: CPT | Mod: CPTII,S$GLB,, | Performed by: STUDENT IN AN ORGANIZED HEALTH CARE EDUCATION/TRAINING PROGRAM

## 2024-10-31 PROCEDURE — 99214 OFFICE O/P EST MOD 30 MIN: CPT | Mod: S$GLB,,, | Performed by: STUDENT IN AN ORGANIZED HEALTH CARE EDUCATION/TRAINING PROGRAM

## 2024-10-31 PROCEDURE — 1159F MED LIST DOCD IN RCRD: CPT | Mod: CPTII,S$GLB,, | Performed by: STUDENT IN AN ORGANIZED HEALTH CARE EDUCATION/TRAINING PROGRAM

## 2024-10-31 PROCEDURE — 3288F FALL RISK ASSESSMENT DOCD: CPT | Mod: CPTII,S$GLB,, | Performed by: STUDENT IN AN ORGANIZED HEALTH CARE EDUCATION/TRAINING PROGRAM

## 2024-10-31 PROCEDURE — 1126F AMNT PAIN NOTED NONE PRSNT: CPT | Mod: CPTII,S$GLB,, | Performed by: STUDENT IN AN ORGANIZED HEALTH CARE EDUCATION/TRAINING PROGRAM

## 2024-10-31 PROCEDURE — 3074F SYST BP LT 130 MM HG: CPT | Mod: CPTII,S$GLB,, | Performed by: STUDENT IN AN ORGANIZED HEALTH CARE EDUCATION/TRAINING PROGRAM

## 2024-10-31 PROCEDURE — 1101F PT FALLS ASSESS-DOCD LE1/YR: CPT | Mod: CPTII,S$GLB,, | Performed by: STUDENT IN AN ORGANIZED HEALTH CARE EDUCATION/TRAINING PROGRAM

## 2024-10-31 RX ORDER — NAPROXEN SODIUM 220 MG/1
81 TABLET, FILM COATED ORAL DAILY
COMMUNITY

## 2024-10-31 NOTE — PROGRESS NOTES
Patient ID:  Payton Gutierrez  79 y.o.  female      Assessment:       1. Encounter for screening for cardiovascular disorders    2. Primary hypertension    3. PVC's (premature ventricular contractions)          Plan:     She had 1 episode of transient vision change. She did not seek any medical attention when it happened. Her description of it is quite unusual and does not fit anything specific. Has not had recurrence. Zio patch did not show any afib/aflutter. Underlying rhythm is sinus. Carotid doppler showed no ultrasound evidence to suggest a hemodynamically significant internal carotid artery stenosis. There was elevated velocity within the right common carotid artery bulb which may represent a focus of atherosclerotic stenosis. She is not interested in further imaging studies at this time. Will defer further management to PCP. Asked her to reach out to PCP if she changes her mind about further imaging such as CTA of head and neck. I also give referral neurology at last visit but she is not interested in seeing one at this time. Instructed her to go to the ER immediately if similar symptom arise.     Has occasional PVCs on zio. Asymptomatic. At last visit she did not want to have an echo but she is agreeable now. Obtain TTE to evaluate heart function and structure.    BP is controlled. Continue amlodipine 5 mg daily and lisinopril 20 mg daily.     Continue Crestor 10 mg daily.       Subjective:     Chief Complaint   Patient presents with    Results     Zio & carotid    Hypertension       HPI:  Payton Gutierrez is a 79 y.o. female who presents today for follow up. She had an unusual episode of vision abnormality back in July.  She was watching the Olympics and all of a sudden she could not see the left side of the screen.  It was only the screen that she had this problem with but not the surroundings which is unusual.  Denies any other symptoms.  She did not seek any immediate medical attention.  It resolved on its  own after while.  She saw an ophthalmologist outpatient who did not think it is intrinsically eye related recommended other investigation. She has not had recurrence. Reports no chest pain, dyspnea, orthopnea, PND, swelling of extremities, palpitations, syncope or near syncope.  Zio patch did not show any afib/aflutter. Underlying rhythm is sinus. She had several nonsustained atrial tachycardia episodes. Carotid doppler showed no ultrasound evidence to suggest a hemodynamically significant internal carotid artery stenosis. There was elevated velocity within the right common carotid artery bulb which may represent a focus of atherosclerotic stenosis.      PREVIOUS CARDIAC TESTING/PROCEDURES HISTORY:    Other Most Recent Cardiology Results  Results for orders placed during the hospital encounter of 09/16/24    Cardiac Monitor - 3-15 Day Adult ([facility] Only)    Interpretation Summary    Predominant underlying rhythm was Sinus Rhythm.    Patient had a min HR of 46 bpm, max HR of 179 bpm, and avg HR of 71 bpm.    28 Supraventricular Tachycardia runs occurred, the run with the fastest interval lasting 8 beats with a max rate of 179 bpm, the longest lasting 11.8 secs with an avg rate of 115 bpm.    Isolated SVEs were rare (<1.0%, 3153), SVE Couplets were rare (<1.0%, 612), and SVE Triplets were rare (<1.0%, 55).    Isolated VEs were occasional (2.7%, 33500), VE Couplets were rare (<1.0%, 113), and no VE Triplets were present. Ventricular Trigeminy was present.    The patient complained of 6 episodes. The symptom(s) included chest pain. The corresponding rhythm to the patient reported event was normal sinus rhythm with a normal CT interval with rates of 67-74 bpm and sinus bradycardia at rates of 54-59 bpm.    There were 1 auto-triggered events corresponding with normal sinus rhythm with a rate of 84 bpm. There were PVCs    Se full report  During clinic visit the patient clarified her reported symptom of chest pain further.  It was actually shoulder pain which is a chronic issue for her due to rotator cuff problems.    Most Recent EKG Results  Results for orders placed or performed in visit on 09/09/24   IN OFFICE EKG 12-LEAD (to [place])    Collection Time: 09/09/24 10:03 AM   Result Value Ref Range    QRS Duration 76 ms    [facility]HS QTC Calculation 426 ms    Narrative    Test Reason : I10,    Vent. Rate : 074 BPM     Atrial Rate : 074 BPM     P-R Int : 162 ms          QRS Dur : 076 ms      QT Int : 384 ms       P-R-T Axes : 060 065 070 degrees     QTc Int : 426 ms    Sinus rhythm with occasional Premature ventricular complexes  Otherwise normal ECG  No previous ECGs available  Confirmed by Brett Mario MD ([id number]) on 9/24/2024 7:35:43 PM    Referred By: ALBERTO DUKE           Confirmed By:Brett Mario MD       The ASCVD Risk score (Mine ARGUELLES, et al., 2019) failed to calculate for the following reasons:    The 2019 ASCVD risk score is only valid for ages 40 to 79    Review of patient's allergies indicates:   Allergen Reactions    Codeine      Other reaction(s): Not available    Sulfa (sulfonamide antibiotics)      Other reaction(s): Not available    Sulfur Other (See Comments)       Past Medical History:   Diagnosis Date    Glaucoma     Hyperlipidemia     Hypertension      Past Surgical History:   Procedure Laterality Date    CATARACT EXTRACTION  2016    HYSTERECTOMY       Social History     Tobacco Use    Smoking status: Never    Smokeless tobacco: Never   Substance Use Topics    Alcohol use: No    Drug use: No          REVIEW OF SYSTEMS  CONSTITUTIONAL: No chills, no fatigue, no fever.   EYES: No double vision, no blurred vision  NEURO: No headaches, no dizziness  RESPIRATORY: No cough, no wheezing.    CARDIOVASCULAR: See HPI   GI: No abdominal pain, no melena, no diarrhea, no nausea or vomiting.   : No  dysuria and frequency, no hematuria  SKIN: no bruising, no discoloration  ENDOCRINE: no polyphagia, no heat intolerance, no cold  "intolerance  PSYCHIATRIC: no depression, no anxiety, no memory loss  MUSCULOSKELETAL: no  neck pain, no muscle weakness,no back pain          Objective:        Vitals:    10/31/24 1412   BP: 122/60   Pulse: 77       PHYSICAL EXAM  CONSTITUTIONAL: In no apparent distress  HEENT: Normocephalic. Pupils normal and conjunctivae normal. No pallor  NECK: No JVD  LUNGS: B/L air entry to the lungs, clear to auscultation. No rales, wheezing or rhonchi.  HEART: Normal rate and regular rhythm. Normal S1 and S2.  No murmur   ABDOMEN: soft, non-tender; bowel sounds normal  EXTREMITIES: No edema. Good palpable distal pulses.  NEURO: AAO X 3, no gross sensory or motor deficits  SKIN:  No rash  Psych:  Normal affect    Lab Results   Component Value Date    WBC 5.82 09/03/2024    HGB 13.2 09/03/2024    HCT 39.1 09/03/2024     09/03/2024    CHOL 171 11/06/2023    TRIG 94 11/06/2023    HDL 63 11/06/2023    ALT 13 05/02/2024    AST 15 05/02/2024     05/02/2024    K 3.6 05/02/2024     05/02/2024    CREATININE 1.2 05/02/2024    BUN 21 05/02/2024    CO2 25 05/02/2024    TSH 1.40 05/02/2022    MICROALBUR 0.9 05/05/2023        @  Lab Results   Component Value Date    CHOL 171 11/06/2023    CHOL 144 05/05/2023    CHOL 169 05/02/2022     Lab Results   Component Value Date    HDL 63 11/06/2023    HDL 56 05/05/2023    HDL 60 05/02/2022     Lab Results   Component Value Date    LDLCALC 89 11/06/2023    LDLCALC 72 05/05/2023    LDLCALC 87 05/02/2022     No results found for: "DLDL"  Lab Results   Component Value Date    TRIG 94 11/06/2023    TRIG 82 05/05/2023    TRIG 128 05/02/2022       f1   Lab Results   Component Value Date    CHOLHDL 2.7 11/06/2023    CHOLHDL 2.6 05/05/2023    CHOLHDL 2.8 05/02/2022            Current Outpatient Medications   Medication Instructions    amLODIPine (NORVASC) 5 mg, Oral, 2 times daily    aspirin 81 mg, Oral, Daily    diclofenac sodium (VOLTAREN) 2 g, Topical (Top), Daily    ergocalciferol, " vitamin D2, (VITAMIN D ORAL) 1 tablet, Daily    lisinopriL (PRINIVIL,ZESTRIL) 20 mg, Oral, Daily    lithium carbonate 150 mg, Oral, Daily    LUMIGAN 0.01 % Drop 1 drop, Nightly    omeprazole (PRILOSEC) 20 mg, Oral, Daily    rosuvastatin (CRESTOR) 10 mg, Oral, Daily       Medication List with Changes/Refills   Current Medications    AMLODIPINE (NORVASC) 5 MG TABLET    Take 1 tablet (5 mg total) by mouth 2 (two) times daily.    ASPIRIN 81 MG CHEW    Take 81 mg by mouth once daily.    DICLOFENAC SODIUM (VOLTAREN) 1 % GEL    Apply 2 g topically once daily. for 10 days    ERGOCALCIFEROL, VITAMIN D2, (VITAMIN D ORAL)    Take 1 tablet by mouth once daily.    LISINOPRIL (PRINIVIL,ZESTRIL) 20 MG TABLET    Take 1 tablet (20 mg total) by mouth once daily.    LITHIUM CARBONATE 150 MG CAPSULE    Take 1 capsule (150 mg total) by mouth once daily.    LUMIGAN 0.01 % DROP    Place 1 drop into both eyes every evening.    OMEPRAZOLE (PRILOSEC) 20 MG CAPSULE    Take 1 capsule (20 mg total) by mouth once daily.    ROSUVASTATIN (CRESTOR) 10 MG TABLET    Take 1 tablet (10 mg total) by mouth once daily.               All pertinent labs, imaging, and EKGs reviewed.  Patient's most recent EKG tracing was personally interpreted by this provider.    Problem List Items Addressed This Visit    None  Visit Diagnoses       Encounter for screening for cardiovascular disorders    -  Primary    Primary hypertension        Relevant Orders    Echo    Echo Saline Bubble? Yes    PVC's (premature ventricular contractions)

## 2024-11-05 ENCOUNTER — TELEPHONE (OUTPATIENT)
Dept: CARDIOLOGY | Facility: HOSPITAL | Age: 79
End: 2024-11-05

## 2024-11-06 ENCOUNTER — CLINICAL SUPPORT (OUTPATIENT)
Dept: CARDIOLOGY | Facility: HOSPITAL | Age: 79
End: 2024-11-06
Attending: STUDENT IN AN ORGANIZED HEALTH CARE EDUCATION/TRAINING PROGRAM
Payer: COMMERCIAL

## 2024-11-06 VITALS — HEIGHT: 63 IN | WEIGHT: 151 LBS | BODY MASS INDEX: 26.75 KG/M2

## 2024-11-06 DIAGNOSIS — I10 PRIMARY HYPERTENSION: ICD-10-CM

## 2024-11-06 LAB
AORTIC ROOT ANNULUS: 3 CM
AORTIC VALVE CUSP SEPERATION: 2 CM
APICAL FOUR CHAMBER EJECTION FRACTION: 68 %
APICAL TWO CHAMBER EJECTION FRACTION: 74 %
ASCENDING AORTA: 2.8 CM
AV INDEX (PROSTH): 0.92
AV MEAN GRADIENT: 4 MMHG
AV PEAK GRADIENT: 7.8 MMHG
AV VALVE AREA BY VELOCITY RATIO: 2.9 CM²
AV VALVE AREA: 2.9 CM²
AV VELOCITY RATIO: 0.93
BSA FOR ECHO PROCEDURE: 1.74 M2
CV ECHO LV RWT: 0.23 CM
DOP CALC AO PEAK VEL: 1.4 M/S
DOP CALC AO VTI: 30.2 CM
DOP CALC LVOT AREA: 3.1 CM2
DOP CALC LVOT DIAMETER: 2 CM
DOP CALC LVOT PEAK VEL: 1.3 M/S
DOP CALC LVOT STROKE VOLUME: 87 CM3
DOP CALC MV VTI: 32.8 CM
DOP CALCLVOT PEAK VEL VTI: 27.7 CM
E WAVE DECELERATION TIME: 176 MSEC
E/A RATIO: 1.02
E/E' RATIO: 9.3 M/S
ECHO LV POSTERIOR WALL: 0.5 CM (ref 0.6–1.1)
FRACTIONAL SHORTENING: 34.9 % (ref 28–44)
INTERVENTRICULAR SEPTUM: 0.9 CM (ref 0.6–1.1)
IVC DIAMETER: 1.8 CM
LEFT ATRIUM AREA SYSTOLIC (APICAL 4 CHAMBER): 15.3 CM2
LEFT ATRIUM SIZE: 2.9 CM
LEFT INTERNAL DIMENSION IN SYSTOLE: 2.8 CM (ref 2.1–4)
LEFT VENTRICLE DIASTOLIC VOLUME INDEX: 48.31 ML/M2
LEFT VENTRICLE DIASTOLIC VOLUME: 83.1 ML
LEFT VENTRICLE END DIASTOLIC VOLUME APICAL 2 CHAMBER: 85 ML
LEFT VENTRICLE END DIASTOLIC VOLUME APICAL 4 CHAMBER: 83.1 ML
LEFT VENTRICLE END SYSTOLIC VOLUME APICAL 4 CHAMBER: 34.3 ML
LEFT VENTRICLE MASS INDEX: 51.5 G/M2
LEFT VENTRICLE SYSTOLIC VOLUME INDEX: 17.2 ML/M2
LEFT VENTRICLE SYSTOLIC VOLUME: 29.6 ML
LEFT VENTRICULAR INTERNAL DIMENSION IN DIASTOLE: 4.3 CM (ref 3.5–6)
LEFT VENTRICULAR MASS: 88.5 G
LV LATERAL E/E' RATIO: 9.3 M/S
LV SEPTAL E/E' RATIO: 9.3 M/S
LVED V (TEICH): 83.1 ML
LVES V (TEICH): 29.6 ML
LVOT MG: 4 MMHG
LVOT MV: 0.9 CM/S
MV MEAN GRADIENT: 2 MMHG
MV PEAK A VEL: 0.91 M/S
MV PEAK E VEL: 0.93 M/S
MV PEAK GRADIENT: 4 MMHG
MV STENOSIS PRESSURE HALF TIME: 86 MS
MV VALVE AREA BY CONTINUITY EQUATION: 2.65 CM2
MV VALVE AREA P 1/2 METHOD: 2.56 CM2
OHS LV EJECTION FRACTION SIMPSONS BIPLANE MOD: 70 %
PISA TR MAX VEL: 2.79 M/S
PV MV: 0.67 M/S
PV PEAK GRADIENT: 4 MMHG
PV PEAK VELOCITY: 1 M/S
RA PRESSURE ESTIMATED: 3 MMHG
RIGHT ATRIUM VOLUME AREA LENGTH APICAL 4 CHAMBER: 28 ML
RIGHT VENTRICULAR END-DIASTOLIC DIMENSION: 2.1 CM
RV TB RVSP: 6 MMHG
RV TISSUE DOPPLER FREE WALL SYSTOLIC VELOCITY 1 (APICAL 4 CHAMBER VIEW): 15.3 CM/S
TDI LATERAL: 0.1 M/S
TDI SEPTAL: 0.1 M/S
TDI: 0.1 M/S
TR MAX PG: 31 MMHG
TV REST PULMONARY ARTERY PRESSURE: 34 MMHG
Z-SCORE OF LEFT VENTRICULAR DIMENSION IN END DIASTOLE: -1.04
Z-SCORE OF LEFT VENTRICULAR DIMENSION IN END SYSTOLE: -0.42

## 2024-11-06 PROCEDURE — 93306 TTE W/DOPPLER COMPLETE: CPT | Mod: 26,,, | Performed by: INTERNAL MEDICINE

## 2024-11-06 PROCEDURE — 93306 TTE W/DOPPLER COMPLETE: CPT

## 2024-11-07 DIAGNOSIS — R10.9 UNSPECIFIED ABDOMINAL PAIN: ICD-10-CM

## 2024-11-07 DIAGNOSIS — G89.29 CHRONIC RIGHT LOWER QUADRANT PAIN: Primary | ICD-10-CM

## 2024-11-07 DIAGNOSIS — R10.31 CHRONIC RIGHT LOWER QUADRANT PAIN: Primary | ICD-10-CM

## 2024-11-12 ENCOUNTER — LAB VISIT (OUTPATIENT)
Dept: LAB | Facility: HOSPITAL | Age: 79
End: 2024-11-12
Attending: INTERNAL MEDICINE
Payer: COMMERCIAL

## 2024-11-12 DIAGNOSIS — R10.31 ABDOMINAL PAIN, RIGHT LOWER QUADRANT: ICD-10-CM

## 2024-11-12 DIAGNOSIS — R10.31 ABDOMINAL PAIN, RIGHT LOWER QUADRANT: Primary | ICD-10-CM

## 2024-11-12 LAB
ALBUMIN SERPL BCP-MCNC: 4.3 G/DL (ref 3.5–5.2)
ALP SERPL-CCNC: 115 U/L (ref 55–135)
ALT SERPL W/O P-5'-P-CCNC: 13 U/L (ref 10–44)
ANION GAP SERPL CALC-SCNC: 7 MMOL/L (ref 8–16)
AST SERPL-CCNC: 18 U/L (ref 10–40)
BASOPHILS # BLD AUTO: 0.07 K/UL (ref 0–0.2)
BASOPHILS NFR BLD: 1.4 % (ref 0–1.9)
BILIRUB SERPL-MCNC: 0.7 MG/DL (ref 0.1–1)
BUN SERPL-MCNC: 26 MG/DL (ref 8–23)
CALCIUM SERPL-MCNC: 9.6 MG/DL (ref 8.7–10.5)
CHLORIDE SERPL-SCNC: 109 MMOL/L (ref 95–110)
CO2 SERPL-SCNC: 25 MMOL/L (ref 23–29)
CREAT SERPL-MCNC: 1.1 MG/DL (ref 0.5–1.4)
CRP SERPL-MCNC: 0.2 MG/DL
DIFFERENTIAL METHOD BLD: NORMAL
EOSINOPHIL # BLD AUTO: 0.4 K/UL (ref 0–0.5)
EOSINOPHIL NFR BLD: 7.1 % (ref 0–8)
ERYTHROCYTE [DISTWIDTH] IN BLOOD BY AUTOMATED COUNT: 13 % (ref 11.5–14.5)
EST. GFR  (NO RACE VARIABLE): 50.5 ML/MIN/1.73 M^2
GLUCOSE SERPL-MCNC: 90 MG/DL (ref 70–110)
HCT VFR BLD AUTO: 37.4 % (ref 37–48.5)
HGB BLD-MCNC: 12.5 G/DL (ref 12–16)
IMM GRANULOCYTES # BLD AUTO: 0.01 K/UL (ref 0–0.04)
IMM GRANULOCYTES NFR BLD AUTO: 0.2 % (ref 0–0.5)
LYMPHOCYTES # BLD AUTO: 1.4 K/UL (ref 1–4.8)
LYMPHOCYTES NFR BLD: 28.4 % (ref 18–48)
MCH RBC QN AUTO: 30.6 PG (ref 27–31)
MCHC RBC AUTO-ENTMCNC: 33.4 G/DL (ref 32–36)
MCV RBC AUTO: 91 FL (ref 82–98)
MONOCYTES # BLD AUTO: 0.5 K/UL (ref 0.3–1)
MONOCYTES NFR BLD: 9.3 % (ref 4–15)
NEUTROPHILS # BLD AUTO: 2.7 K/UL (ref 1.8–7.7)
NEUTROPHILS NFR BLD: 53.6 % (ref 38–73)
NRBC BLD-RTO: 0 /100 WBC
PLATELET # BLD AUTO: 243 K/UL (ref 150–450)
PMV BLD AUTO: 9.9 FL (ref 9.2–12.9)
POTASSIUM SERPL-SCNC: 3.8 MMOL/L (ref 3.5–5.1)
PROT SERPL-MCNC: 6.9 G/DL (ref 6–8.4)
RBC # BLD AUTO: 4.09 M/UL (ref 4–5.4)
SODIUM SERPL-SCNC: 141 MMOL/L (ref 136–145)
WBC # BLD AUTO: 5.07 K/UL (ref 3.9–12.7)

## 2024-11-12 PROCEDURE — 80053 COMPREHEN METABOLIC PANEL: CPT | Performed by: INTERNAL MEDICINE

## 2024-11-12 PROCEDURE — 36415 COLL VENOUS BLD VENIPUNCTURE: CPT | Performed by: INTERNAL MEDICINE

## 2024-11-12 PROCEDURE — 85025 COMPLETE CBC W/AUTO DIFF WBC: CPT | Performed by: INTERNAL MEDICINE

## 2024-11-12 PROCEDURE — 86140 C-REACTIVE PROTEIN: CPT | Performed by: INTERNAL MEDICINE

## 2024-11-13 ENCOUNTER — HOSPITAL ENCOUNTER (OUTPATIENT)
Dept: RADIOLOGY | Facility: HOSPITAL | Age: 79
Discharge: HOME | End: 2024-11-13
Attending: INTERNAL MEDICINE
Payer: COMMERCIAL

## 2024-11-13 DIAGNOSIS — R10.9 UNSPECIFIED ABDOMINAL PAIN: ICD-10-CM

## 2024-11-13 PROCEDURE — 74178 CT ABD&PLV WO CNTR FLWD CNTR: CPT | Mod: 26,,, | Performed by: RADIOLOGY

## 2024-11-13 PROCEDURE — 74178 CT ABD&PLV WO CNTR FLWD CNTR: CPT | Mod: TC,PO

## 2024-11-13 PROCEDURE — 25500020 PHARM REV CODE 255: Mod: PO | Performed by: INTERNAL MEDICINE

## 2024-11-13 RX ADMIN — IOHEXOL 100 ML: 350 INJECTION, SOLUTION INTRAVENOUS at 03:11

## 2024-11-14 ENCOUNTER — TELEPHONE (OUTPATIENT)
Dept: CARDIOLOGY | Facility: CLINIC | Age: 79
End: 2024-11-14
Payer: COMMERCIAL

## 2024-12-02 ENCOUNTER — TELEPHONE (OUTPATIENT)
Dept: FAMILY MEDICINE | Facility: CLINIC | Age: 79
End: 2024-12-02
Payer: COMMERCIAL

## 2024-12-02 DIAGNOSIS — Z79.899 ENCOUNTER FOR LONG-TERM (CURRENT) USE OF MEDICATIONS: Primary | ICD-10-CM

## 2024-12-02 DIAGNOSIS — E78.00 HIGH CHOLESTEROL: ICD-10-CM

## 2024-12-02 DIAGNOSIS — N18.31 STAGE 3A CHRONIC KIDNEY DISEASE: ICD-10-CM

## 2024-12-02 DIAGNOSIS — I10 ESSENTIAL HYPERTENSION: ICD-10-CM

## 2024-12-05 LAB
ALBUMIN SERPL-MCNC: 4.4 G/DL (ref 3.6–5.1)
ALBUMIN/CREAT UR: 17 MG/G CREAT
ALBUMIN/GLOB SERPL: 1.8 (CALC) (ref 1–2.5)
ALP SERPL-CCNC: 139 U/L (ref 37–153)
ALT SERPL-CCNC: 10 U/L (ref 6–29)
AST SERPL-CCNC: 14 U/L (ref 10–35)
BILIRUB SERPL-MCNC: 0.8 MG/DL (ref 0.2–1.2)
BUN SERPL-MCNC: 23 MG/DL (ref 7–25)
BUN/CREAT SERPL: 21 (CALC) (ref 6–22)
CALCIUM SERPL-MCNC: 9.8 MG/DL (ref 8.6–10.4)
CHLORIDE SERPL-SCNC: 106 MMOL/L (ref 98–110)
CHOLEST SERPL-MCNC: 177 MG/DL
CHOLEST/HDLC SERPL: 3 (CALC)
CO2 SERPL-SCNC: 25 MMOL/L (ref 20–32)
CREAT SERPL-MCNC: 1.09 MG/DL (ref 0.6–0.95)
CREAT UR-MCNC: 89 MG/DL (ref 20–275)
EGFR: 51 ML/MIN/1.73M2
GLOBULIN SER CALC-MCNC: 2.5 G/DL (CALC) (ref 1.9–3.7)
GLUCOSE SERPL-MCNC: 94 MG/DL (ref 65–99)
HDLC SERPL-MCNC: 60 MG/DL
LDLC SERPL CALC-MCNC: 93 MG/DL (CALC)
MICROALBUMIN UR-MCNC: 1.5 MG/DL
NONHDLC SERPL-MCNC: 117 MG/DL (CALC)
POTASSIUM SERPL-SCNC: 4.2 MMOL/L (ref 3.5–5.3)
PROT SERPL-MCNC: 6.9 G/DL (ref 6.1–8.1)
SODIUM SERPL-SCNC: 139 MMOL/L (ref 135–146)
TRIGL SERPL-MCNC: 137 MG/DL

## 2024-12-11 ENCOUNTER — OFFICE VISIT (OUTPATIENT)
Dept: FAMILY MEDICINE | Facility: CLINIC | Age: 79
End: 2024-12-11
Payer: COMMERCIAL

## 2024-12-11 VITALS
HEIGHT: 63 IN | WEIGHT: 147 LBS | SYSTOLIC BLOOD PRESSURE: 136 MMHG | HEART RATE: 87 BPM | BODY MASS INDEX: 26.05 KG/M2 | OXYGEN SATURATION: 98 % | DIASTOLIC BLOOD PRESSURE: 64 MMHG

## 2024-12-11 DIAGNOSIS — I87.303 STASIS EDEMA OF BOTH LOWER EXTREMITIES: ICD-10-CM

## 2024-12-11 DIAGNOSIS — G45.9 TIA (TRANSIENT ISCHEMIC ATTACK): Primary | ICD-10-CM

## 2024-12-11 DIAGNOSIS — M51.9 LUMBAR DISC DISEASE: ICD-10-CM

## 2024-12-11 DIAGNOSIS — N18.31 STAGE 3A CHRONIC KIDNEY DISEASE: ICD-10-CM

## 2024-12-11 DIAGNOSIS — E83.118 OTHER HEMOCHROMATOSIS: ICD-10-CM

## 2024-12-11 DIAGNOSIS — E78.00 HIGH CHOLESTEROL: ICD-10-CM

## 2024-12-11 DIAGNOSIS — F31.76 BIPOLAR DISORDER, IN FULL REMISSION, MOST RECENT EPISODE DEPRESSED: ICD-10-CM

## 2024-12-11 DIAGNOSIS — I10 ESSENTIAL HYPERTENSION: ICD-10-CM

## 2024-12-11 DIAGNOSIS — H90.0 CONDUCTIVE HEARING LOSS, BILATERAL: ICD-10-CM

## 2024-12-11 DIAGNOSIS — G20.A1 PARKINSON'S DISEASE WITHOUT DYSKINESIA OR FLUCTUATING MANIFESTATIONS: ICD-10-CM

## 2024-12-11 DIAGNOSIS — K21.9 GASTROESOPHAGEAL REFLUX DISEASE WITHOUT ESOPHAGITIS: ICD-10-CM

## 2024-12-11 PROCEDURE — 3078F DIAST BP <80 MM HG: CPT | Mod: CPTII,S$GLB,, | Performed by: FAMILY MEDICINE

## 2024-12-11 PROCEDURE — 3288F FALL RISK ASSESSMENT DOCD: CPT | Mod: CPTII,S$GLB,, | Performed by: FAMILY MEDICINE

## 2024-12-11 PROCEDURE — 1159F MED LIST DOCD IN RCRD: CPT | Mod: CPTII,S$GLB,, | Performed by: FAMILY MEDICINE

## 2024-12-11 PROCEDURE — 1125F AMNT PAIN NOTED PAIN PRSNT: CPT | Mod: CPTII,S$GLB,, | Performed by: FAMILY MEDICINE

## 2024-12-11 PROCEDURE — 1101F PT FALLS ASSESS-DOCD LE1/YR: CPT | Mod: CPTII,S$GLB,, | Performed by: FAMILY MEDICINE

## 2024-12-11 PROCEDURE — 99214 OFFICE O/P EST MOD 30 MIN: CPT | Mod: S$GLB,,, | Performed by: FAMILY MEDICINE

## 2024-12-11 PROCEDURE — 3075F SYST BP GE 130 - 139MM HG: CPT | Mod: CPTII,S$GLB,, | Performed by: FAMILY MEDICINE

## 2024-12-13 PROBLEM — G45.9 TIA (TRANSIENT ISCHEMIC ATTACK): Status: ACTIVE | Noted: 2024-12-13

## 2024-12-13 PROBLEM — I87.303 STASIS EDEMA OF BOTH LOWER EXTREMITIES: Status: ACTIVE | Noted: 2024-12-13

## 2024-12-13 NOTE — PROGRESS NOTES
SUBJECTIVE:    Patient ID: Payton Gutierrez is a 79 y.o. female.    Chief Complaint: Hypertension (Brought bottles, review Lab-results, declined Dexa, concern about height, discuss about medication(side effect), explant stress test please, RLQ pain, abc )    Hypertension  Pertinent negatives include no chest pain, palpitations or shortness of breath.       History of Present Illness    CHIEF COMPLAINT:  Payton presents today for follow-up on multiple health concerns.    EDEMA:  She reports leg swelling, worse in the evening and more pronounced in the left leg. The swelling is minimal in the morning, progressively worsens throughout the day, and almost returns to normal by the next morning. She denies any other associated symptoms.    URINARY SYMPTOMS:  She reports urinary frequency, urinating every few hours during the day and experiencing nocturia 3-4 times per night, which is affecting her sleep.    CARDIAC HISTORY:  She underwent a cardiac workup following an episode of vision changes. Evaluations included continuous heart monitoring for two weeks, which showed brief episodes of increased heart rate but no atrial fibrillation, and an echocardiogram with bubble study, which indicated a structurally normal heart. She was advised to start daily baby aspirin, which she has implemented. She denies any current cardiac symptoms or concerns.    ABDOMINAL PAIN:  She reports lower right abdominal pain for the past month. CT showed no abnormalities or blockages. She believes the pain may be related to her colon and disagrees with the suggestion that it may be due to adhesions.    GASTROINTESTINAL:  She denies constipation and reports regular bowel movements. She takes one chewable fiber supplement at night, noting that taking the recommended three per day would cause excessive bowel movements.    HEMOCHROMATOSIS:  She reports no blood has been taken for her hemochromatosis in two years. She undergoes ferritin testing every  six months with Dr. Monroe. Her last ferritin test result was approximately 80-87.    MUSCULOSKELETAL PAIN:  She reports back and hip pain that radiates to the thigh, affecting her ability to walk. She can walk approximately two blocks before experiencing discomfort. She takes Advil for pain management when symptoms occur. She acknowledges not performing prescribed exercises regularly but attempts to walk inside her house occasionally.    FUNCTIONAL STATUS:  She reports ability to perform activities of daily living, including grocery shopping independently. She can still mop and vacuum, though vacuuming affects her back.      ROS:  Constitutional: -appetite change, -chills, -fatigue, -fever, -unexpected weight change  HENT: -ear pain, -trouble swallowing  Eyes: -pain, -discharge, -visual disturbance  Respiratory: -apnea, -cough, -shortness of breath, -wheezing  Cardiovascular: -chest pain, -leg swelling, -palpitations  Gastrointestinal: +abdominal pain, -blood in stool, -constipation, -diarrhea, -nausea, -vomiting, -reflux  Endocrine: -cold intolerance, -heat intolerance, -polydipsia  Genitourinary: -bladder incontinence, -dysuria, -erectile dysfunction, +frequency, -hematuria, -testicular pain, -urgency, +nocturia  Musculoskeletal: -gait problem, -joint swelling, -myalgia, +joint pain  Neurological: -dizziness, -seizures, -numbness  Psychiatric/Behavioral: -agitation, -hallucinations, -nervous, -anxiety symptoms         Telephone on 12/02/2024   Component Date Value Ref Range Status    Creatinine, Urine 12/04/2024 89  20 - 275 mg/dL Final    Microalb, Ur 12/04/2024 1.5  See Note: mg/dL Final    Microalb/Creat Ratio 12/04/2024 17  <30 mg/g creat Final    Cholesterol 12/04/2024 177  <200 mg/dL Final    HDL 12/04/2024 60  > OR = 50 mg/dL Final    Triglycerides 12/04/2024 137  <150 mg/dL Final    LDL Cholesterol 12/04/2024 93  mg/dL (calc) Final    HDL/Cholesterol Ratio 12/04/2024 3.0  <5.0 (calc) Final    Non HDL  Chol. (LDL+VLDL) 12/04/2024 117  <130 mg/dL (calc) Final    Glucose 12/04/2024 94  65 - 99 mg/dL Final    BUN 12/04/2024 23  7 - 25 mg/dL Final    Creatinine 12/04/2024 1.09 (H)  0.60 - 0.95 mg/dL Final    eGFR 12/04/2024 51 (L)  > OR = 60 mL/min/1.73m2 Final    BUN/Creatinine Ratio 12/04/2024 21  6 - 22 (calc) Final    Sodium 12/04/2024 139  135 - 146 mmol/L Final    Potassium 12/04/2024 4.2  3.5 - 5.3 mmol/L Final    Chloride 12/04/2024 106  98 - 110 mmol/L Final    CO2 12/04/2024 25  20 - 32 mmol/L Final    Calcium 12/04/2024 9.8  8.6 - 10.4 mg/dL Final    Total Protein 12/04/2024 6.9  6.1 - 8.1 g/dL Final    Albumin 12/04/2024 4.4  3.6 - 5.1 g/dL Final    Globulin, Total 12/04/2024 2.5  1.9 - 3.7 g/dL (calc) Final    Albumin/Globulin Ratio 12/04/2024 1.8  1.0 - 2.5 (calc) Final    Total Bilirubin 12/04/2024 0.8  0.2 - 1.2 mg/dL Final    Alkaline Phosphatase 12/04/2024 139  37 - 153 U/L Final    AST 12/04/2024 14  10 - 35 U/L Final    ALT 12/04/2024 10  6 - 29 U/L Final   Lab Visit on 11/12/2024   Component Date Value Ref Range Status    WBC 11/12/2024 5.07  3.90 - 12.70 K/uL Final    RBC 11/12/2024 4.09  4.00 - 5.40 M/uL Final    Hemoglobin 11/12/2024 12.5  12.0 - 16.0 g/dL Final    Hematocrit 11/12/2024 37.4  37.0 - 48.5 % Final    MCV 11/12/2024 91  82 - 98 fL Final    MCH 11/12/2024 30.6  27.0 - 31.0 pg Final    MCHC 11/12/2024 33.4  32.0 - 36.0 g/dL Final    RDW 11/12/2024 13.0  11.5 - 14.5 % Final    Platelets 11/12/2024 243  150 - 450 K/uL Final    MPV 11/12/2024 9.9  9.2 - 12.9 fL Final    Immature Granulocytes 11/12/2024 0.2  0.0 - 0.5 % Final    Gran # (ANC) 11/12/2024 2.7  1.8 - 7.7 K/uL Final    Immature Grans (Abs) 11/12/2024 0.01  0.00 - 0.04 K/uL Final    Lymph # 11/12/2024 1.4  1.0 - 4.8 K/uL Final    Mono # 11/12/2024 0.5  0.3 - 1.0 K/uL Final    Eos # 11/12/2024 0.4  0.0 - 0.5 K/uL Final    Baso # 11/12/2024 0.07  0.00 - 0.20 K/uL Final    nRBC 11/12/2024 0  0 /100 WBC Final    Gran % 11/12/2024  53.6  38.0 - 73.0 % Final    Lymph % 11/12/2024 28.4  18.0 - 48.0 % Final    Mono % 11/12/2024 9.3  4.0 - 15.0 % Final    Eosinophil % 11/12/2024 7.1  0.0 - 8.0 % Final    Basophil % 11/12/2024 1.4  0.0 - 1.9 % Final    Differential Method 11/12/2024 Automated   Final    Sodium 11/12/2024 141  136 - 145 mmol/L Final    Potassium 11/12/2024 3.8  3.5 - 5.1 mmol/L Final    Chloride 11/12/2024 109  95 - 110 mmol/L Final    CO2 11/12/2024 25  23 - 29 mmol/L Final    Glucose 11/12/2024 90  70 - 110 mg/dL Final    BUN 11/12/2024 26 (H)  8 - 23 mg/dL Final    Creatinine 11/12/2024 1.1  0.5 - 1.4 mg/dL Final    Calcium 11/12/2024 9.6  8.7 - 10.5 mg/dL Final    Total Protein 11/12/2024 6.9  6.0 - 8.4 g/dL Final    Albumin 11/12/2024 4.3  3.5 - 5.2 g/dL Final    Total Bilirubin 11/12/2024 0.7  0.1 - 1.0 mg/dL Final    Alkaline Phosphatase 11/12/2024 115  55 - 135 U/L Final    AST 11/12/2024 18  10 - 40 U/L Final    ALT 11/12/2024 13  10 - 44 U/L Final    eGFR 11/12/2024 50.5 (A)  >60 mL/min/1.73 m^2 Final    Anion Gap 11/12/2024 7 (L)  8 - 16 mmol/L Final    CRP 11/12/2024 0.20  <1.00 mg/dL Final   Clinical Support on 11/06/2024   Component Date Value Ref Range Status    TR Max Billy 11/06/2024 2.79  m/s Final    IVC diameter 11/06/2024 1.80  cm Final    TDI SEPTAL 11/06/2024 0.10  m/s Final    MV valve area p 1/2 method 11/06/2024 2.56  cm2 Final    Ascending aorta 11/06/2024 2.80  cm Final    Ao root annulus 11/06/2024 3.00  cm Final    Pulmonary Valve Mean Velocity 11/06/2024 0.67  m/s Final    PV peak gradient 11/06/2024 4  mmHg Final    PV PEAK VELOCITY 11/06/2024 1.00  m/s Final    MV stenosis pressure 1/2 time 11/06/2024 86.00  ms Final    Triscuspid Valve Regurgitation Pea* 11/06/2024 31  mmHg Final    Left Ventricular Outflow Tract Marly* 11/06/2024 4.00  mmHg Final    Left Ventricular Outflow Tract Marly* 11/06/2024 0.90  cm/s Final    LVOT peak billy 11/06/2024 1.3  m/s Final    MV VTI 11/06/2024 32.8  cm Final    MV valve  area by continuity eq 11/06/2024 2.65  cm2 Final    MV peak gradient 11/06/2024 4  mmHg Final    MV mean gradient 11/06/2024 2  mmHg Final    VENECIA by Velocity Ratio 11/06/2024 2.9  cm² Final    AV index (prosthetic) 11/06/2024 0.92   Final    AV Velocity Ratio 11/06/2024 0.93   Final    AV valve area 11/06/2024 2.9  cm² Final    LVOT peak VTI 11/06/2024 27.7  cm Final    Ao VTI 11/06/2024 30.2  cm Final    Ao peak billy 11/06/2024 1.4  m/s Final    AV peak gradient 11/06/2024 7.8  mmHg Final    AV mean gradient 11/06/2024 4.0  mmHg Final    RA area length vol 11/06/2024 28.00  mL Final    LA size 11/06/2024 2.90  cm Final    MV Peak A Billy 11/06/2024 0.91  m/s Final    RV S' 11/06/2024 15.30  cm/s Final    E/E' ratio 11/06/2024 9.30  m/s Final    LV mass 11/06/2024 88.5  g Final    LV LATERAL E/E' RATIO 11/06/2024 9.30  m/s Final    LV SEPTAL E/E' RATIO 11/06/2024 9.30  m/s Final    E wave deceleration time 11/06/2024 176.00  msec Final    E/A ratio 11/06/2024 1.02   Final    TDI LATERAL 11/06/2024 0.10  m/s Final    MV Peak E Billy 11/06/2024 0.93  m/s Final    PW 11/06/2024 0.5 (A)  0.6 - 1.1 cm Final    Left Ventricle Relative Wall Thick* 11/06/2024 0.23  cm Final    FS 11/06/2024 34.9  28 - 44 % Final    LVOT area 11/06/2024 3.1  cm2 Final    LVOT diameter 11/06/2024 2.0  cm Final    IVS 11/06/2024 0.9  0.6 - 1.1 cm Final    Left Ventricular End Diastolic Vol* 11/06/2024 83.10  mL Final    Left Ventricular End Systolic Volu* 11/06/2024 29.60  mL Final    LV EDV A4C 11/06/2024 83.10  mL Final    LV EDV A2C 11/06/2024 85  mL Final    LV ESV A4C 11/06/2024 34.30  mL Final    LV Diastolic Volume 11/06/2024 83.10  mL Final    LVIDs 11/06/2024 2.8  2.1 - 4.0 cm Final    LV Systolic Volume 11/06/2024 29.60  mL Final    LVIDd 11/06/2024 4.3  3.5 - 6.0 cm Final    LVOT stroke volume 11/06/2024 87.0  cm3 Final    A4C EF 11/06/2024 68  % Final    A2C EF 11/06/2024 74  % Final    Kim's Biplane MOD Ejection Fra* 11/06/2024 70   % Final    Mean e' 11/06/2024 0.10  m/s Final    LA area A4C 11/06/2024 15.30  cm2 Final    RVDD 11/06/2024 2.10  cm Final    AORTIC VALVE CUSP SEPERATION 11/06/2024 2.00  cm Final    Est. RA pres 11/06/2024 3  mmHg Final    RV TB RVSP 11/06/2024 6  mmHg Final    TV resting pulmonary artery pressu* 11/06/2024 34  mmHg Final    BSA 11/06/2024 1.74  m2 Final    LV Systolic Volume Index 11/06/2024 17.2  mL/m2 Final    LV Diastolic Volume Index 11/06/2024 48.31  mL/m2 Final    LV Mass Index 11/06/2024 51.5  g/m2 Final    ZLVIDS 11/06/2024 -0.42   Final    ZLVIDD 11/06/2024 -1.04   Final   Hospital Outpatient Visit on 09/16/2024   Component Date Value Ref Range Status    Holter Hookup Date 09/16/2024 [id number]   Final    Holter Hookup Time 09/16/2024 [id number]   Final    Holter Study End Date 09/16/2024 [id number]   Final    Holter Study End Time 09/16/2024 [id number]   Final    Holter Scan Date 09/16/2024 [id number]   Final    Sinus min HR 09/16/2024 46  bpm Final    Sinus max hr 09/16/2024 179  bpm Final    Sinus avg hr 09/16/2024 71  bpm Final    Event Monitor Day 09/16/2024 13   Final    Holter length hours 09/16/2024 16   Final    holter length minutes 09/16/2024 0   Final    holter length dec hours 09/16/2024 328.00   Final   Office Visit on 09/09/2024   Component Date Value Ref Range Status    QRS Duration 09/09/2024 76  ms Final    [facility] QTC Calculation 09/09/2024 426  ms Final   Lab Visit on 09/03/2024   Component Date Value Ref Range Status    Ferritin 09/03/2024 80.2  20.0 - 300.0 ng/mL Final    WBC 09/03/2024 5.82  3.90 - 12.70 K/uL Final    RBC 09/03/2024 4.28  4.00 - 5.40 M/uL Final    Hemoglobin 09/03/2024 13.2  12.0 - 16.0 g/dL Final    Hematocrit 09/03/2024 39.1  37.0 - 48.5 % Final    MCV 09/03/2024 91  82 - 98 fL Final    MCH 09/03/2024 30.8  27.0 - 31.0 pg Final    MCHC 09/03/2024 33.8  32.0 - 36.0 g/dL Final    RDW 09/03/2024 13.0  11.5 - 14.5 % Final    Platelets 09/03/2024 260  150 - 450 K/uL Final    MPV  09/03/2024 10.3  9.2 - 12.9 fL Final    Immature Granulocytes 09/03/2024 0.2  0.0 - 0.5 % Final    Gran # (ANC) 09/03/2024 3.5  1.8 - 7.7 K/uL Final    Immature Grans (Abs) 09/03/2024 0.01  0.00 - 0.04 K/uL Final    Lymph # 09/03/2024 1.6  1.0 - 4.8 K/uL Final    Mono # 09/03/2024 0.5  0.3 - 1.0 K/uL Final    Eos # 09/03/2024 0.3  0.0 - 0.5 K/uL Final    Baso # 09/03/2024 0.07  0.00 - 0.20 K/uL Final    nRBC 09/03/2024 0  0 /100 WBC Final    Gran % 09/03/2024 59.6  38.0 - 73.0 % Final    Lymph % 09/03/2024 26.6  18.0 - 48.0 % Final    Mono % 09/03/2024 7.9  4.0 - 15.0 % Final    Eosinophil % 09/03/2024 4.5  0.0 - 8.0 % Final    Basophil % 09/03/2024 1.2  0.0 - 1.9 % Final    Differential Method 09/03/2024 Automated   Final       Past Medical History:   Diagnosis Date    Glaucoma     Hyperlipidemia     Hypertension      Social History     Socioeconomic History    Marital status: Single   Tobacco Use    Smoking status: Never    Smokeless tobacco: Never   Substance and Sexual Activity    Alcohol use: No    Drug use: No     Past Surgical History:   Procedure Laterality Date    CATARACT EXTRACTION  2016    HYSTERECTOMY       Family History   Problem Relation Name Age of Onset    Cancer Mother         The CVD Risk score (D'Agostino, et al., 2008) failed to calculate for the following reasons:    The 2008 CVD risk score is only valid for ages 30 to 74    The patient has a prior MI, stroke, CHF, or peripheral vascular disease diagnosis    All of your core healthy metrics are met.      Review of patient's allergies indicates:   Allergen Reactions    Codeine      Other reaction(s): Not available    Sulfa (sulfonamide antibiotics)      Other reaction(s): Not available    Sulfur Other (See Comments)       Current Outpatient Medications:     amLODIPine (NORVASC) 5 MG tablet, Take 1 tablet (5 mg total) by mouth 2 (two) times daily., Disp: 180 tablet, Rfl: 1    aspirin 81 MG Chew, Take 81 mg by mouth once daily., Disp: , Rfl:      "ergocalciferol, vitamin D2, (VITAMIN D ORAL), Take 1 tablet by mouth once daily., Disp: , Rfl:     lisinopriL (PRINIVIL,ZESTRIL) 20 MG tablet, Take 1 tablet (20 mg total) by mouth once daily., Disp: 90 tablet, Rfl: 3    LUMIGAN 0.01 % Drop, Place 1 drop into both eyes every evening., Disp: , Rfl:     omeprazole (PRILOSEC) 20 MG capsule, Take 1 capsule (20 mg total) by mouth once daily., Disp: 90 capsule, Rfl: 3    rosuvastatin (CRESTOR) 10 MG tablet, Take 1 tablet (10 mg total) by mouth once daily., Disp: 90 tablet, Rfl: 3    diclofenac sodium (VOLTAREN) 1 % Gel, Apply 2 g topically once daily. for 10 days, Disp: 450 g, Rfl: 0    lithium carbonate 150 MG capsule, Take 1 capsule (150 mg total) by mouth once daily., Disp: 90 capsule, Rfl: 3    Review of Systems   Constitutional:  Negative for appetite change, chills, fatigue, fever and unexpected weight change.   HENT:  Negative for ear discharge and ear pain.    Eyes:  Positive for visual disturbance. Negative for pain and discharge.   Respiratory:  Negative for apnea, cough, shortness of breath and wheezing.    Cardiovascular:  Negative for chest pain, palpitations and leg swelling.   Gastrointestinal:  Negative for abdominal pain, blood in stool, constipation, diarrhea, nausea, vomiting and reflux.   Endocrine: Negative for cold intolerance, heat intolerance and polydipsia.   Genitourinary:  Negative for bladder incontinence, dysuria, hematuria, nocturia and urgency.   Musculoskeletal:  Positive for back pain. Negative for gait problem, joint swelling and myalgias.   Neurological:  Negative for dizziness, seizures and numbness.   Psychiatric/Behavioral:  Negative for behavioral problems and hallucinations. The patient is not nervous/anxious.            Objective:      Vitals:    12/11/24 1010   BP: 136/64   Pulse: 87   SpO2: 98%   Weight: 66.7 kg (147 lb)   Height: 5' 2.5" (1.588 m)     Physical Exam  Vitals and nursing note reviewed.   Constitutional:       " General: She is not in acute distress.     Appearance: Normal appearance. She is well-developed. She is not toxic-appearing.   HENT:      Head: Normocephalic and atraumatic.      Right Ear: Tympanic membrane and external ear normal.      Left Ear: Tympanic membrane and external ear normal.      Nose: Nose normal.      Mouth/Throat:      Pharynx: Oropharynx is clear. No posterior oropharyngeal erythema.   Eyes:      Pupils: Pupils are equal, round, and reactive to light.   Neck:      Thyroid: No thyromegaly.      Vascular: No carotid bruit.   Cardiovascular:      Rate and Rhythm: Normal rate and regular rhythm.      Heart sounds: Normal heart sounds. No murmur heard.  Pulmonary:      Effort: Pulmonary effort is normal.      Breath sounds: Normal breath sounds. No wheezing or rales.   Abdominal:      General: Bowel sounds are normal. There is no distension.      Palpations: Abdomen is soft.      Tenderness: There is no abdominal tenderness.   Musculoskeletal:         General: No tenderness or deformity. Normal range of motion.      Cervical back: Normal range of motion and neck supple.      Lumbar back: Normal. No spasms.      Right lower leg: Edema (1+ pitting pedal edema) present.      Left lower leg: Edema (1+ pitting pedal edema) present.      Comments: Bends 90 degrees at  waist, shoulders and knees have full range of motion, no pitting edema to lower extremities   Lymphadenopathy:      Cervical: No cervical adenopathy.   Skin:     General: Skin is warm and dry.      Findings: No rash.   Neurological:      General: No focal deficit present.      Mental Status: She is alert and oriented to person, place, and time. Mental status is at baseline.      Cranial Nerves: No cranial nerve deficit.      Coordination: Coordination normal.   Psychiatric:         Mood and Affect: Mood normal.         Behavior: Behavior normal.         Thought Content: Thought content normal.         Judgment: Judgment normal.       Physical  Exam    HENT: Tympanic membranes normal bilaterally. Oropharynx is clear.             Assessment:       1. TIA (transient ischemic attack)    2. Parkinson's disease without dyskinesia or fluctuating manifestations    3. Lumbar disc disease    4. Bipolar disorder, in full remission, most recent episode depressed    5. Conductive hearing loss, bilateral    6. High cholesterol    7. Essential hypertension    8. Stage 3a chronic kidney disease    9. Other hemochromatosis    10. Gastroesophageal reflux disease without esophagitis    11. Stasis edema of both lower extremities         Plan:       TIA (transient ischemic attack)  Now on aspirin 81 mg daily.  No further vision changes described.  Parkinson's disease without dyskinesia or fluctuating manifestations  Very mild symptoms, not on medication for this  Lumbar disc disease  Intermittent low back pain unable to walk greater than 2 blocks, avoid NSAIDs, try Arthritis Strength Tylenol  Bipolar disorder, in full remission, most recent episode depressed  Stable on lithium  Conductive hearing loss, bilateral  Wearing hearing aids  High cholesterol  Cholesterol 177 HDL 60  LDL 93, excellent improvement with rosuvastatin 10 mg  Essential hypertension  Monitor blood pressure measurements daily for 2 weeks.  Decreased amlodipine to 5 mg daily to prevent edema  Stage 3a chronic kidney disease  GFR 51  Other hemochromatosis  Ferritin level down to 80.2  Gastroesophageal reflux disease without esophagitis  Continue omeprazole  Stasis edema of both lower extremities  This probably due to 10 mg of amlodipine.  Reduce amlodipine to 5 mg    Assessment & Plan    IMPRESSION:  - Assessed leg swelling, likely due to amlodipine; decided to reduce dosage to daily and monitor blood pressure response  - Evaluated recent cardiac workup results, including normal echocardiogram and structurally normal heart  - Noted mild kidney issues with improved GFR from 40s to 51  - Considered vision  changes as possible TIA; continued aspirin for stroke prevention  - Reviewed cholesterol levels, finding them within acceptable range  - Assessed chronic back and hip pain; recommended switching from Tylenol to Advil due to kidney concerns    HYPERTENSION:  - Explained the connection between amlodipine and leg swelling.  - Payton to monitor blood pressure at home 2 times daily for 2 weeks.  - Decreased amlodipine from 2 times daily to daily.  - Continued baby aspirin daily.  - Contact the office if blood pressure readings are consistently above 140.  - Option to come in for a free blood pressure check in 2 weeks after [place].    BACK AND HIP PAIN:  - Discussed the importance of movement for managing back and hip pain.  - Payton to engage in regular movement to manage back and hip pain.  - Started arthritis strength Tylenol as needed for pain, replacing Advil.    KIDNEY FUNCTION:  - Educated on the risks of NSAIDs (Advil, Aleve) for kidney function.  - Recommend increasing water intake to support kidney function.  - Discontinued Advil.    TRANSIENT ISCHEMIC ATTACK:  - Provided information on TIA and its potential connection to reported vision changes.    DIVERTICULITIS:  - Payton to continue with current fiber intake routine.  - Continued current fiber supplement routine (up to daily).    FOLLOW-UP:  - Follow up in 6 months.  - Drop off 2-week blood pressure diary at the office in 2 weeks.         Follow up in about 6 months (around 6/11/2025), or tia  htn.        This note was generated with the assistance of ambient listening technology. Verbal consent was obtained by the patient and accompanying visitor(s) for the recording of patient appointment to facilitate this note. I attest to having reviewed and edited the generated note for accuracy, though some syntax or spelling errors may persist. Please contact the author of this note for any clarification.      12/13/2024 Max Landeros

## 2024-12-26 ENCOUNTER — LAB VISIT (OUTPATIENT)
Dept: LAB | Facility: HOSPITAL | Age: 79
End: 2024-12-26
Attending: INTERNAL MEDICINE
Payer: COMMERCIAL

## 2024-12-26 DIAGNOSIS — R82.998 HYPERURICURIA: ICD-10-CM

## 2024-12-26 DIAGNOSIS — N18.30 CHRONIC KIDNEY DISEASE, STAGE III (MODERATE): Primary | ICD-10-CM

## 2024-12-26 DIAGNOSIS — E55.9 AVITAMINOSIS D: ICD-10-CM

## 2024-12-26 DIAGNOSIS — N18.30 CHRONIC KIDNEY DISEASE, STAGE III (MODERATE): ICD-10-CM

## 2024-12-26 LAB
25(OH)D3+25(OH)D2 SERPL-MCNC: 68 NG/ML (ref 30–96)
ALBUMIN SERPL BCP-MCNC: 4.3 G/DL (ref 3.5–5.2)
ANION GAP SERPL CALC-SCNC: 7 MMOL/L (ref 8–16)
BUN SERPL-MCNC: 20 MG/DL (ref 8–23)
CALCIUM SERPL-MCNC: 9.9 MG/DL (ref 8.7–10.5)
CHLORIDE SERPL-SCNC: 107 MMOL/L (ref 95–110)
CO2 SERPL-SCNC: 26 MMOL/L (ref 23–29)
CREAT SERPL-MCNC: 1.1 MG/DL (ref 0.5–1.4)
CREAT UR-MCNC: 100.3 MG/DL (ref 15–325)
EST. GFR  (NO RACE VARIABLE): 50.5 ML/MIN/1.73 M^2
GLUCOSE SERPL-MCNC: 96 MG/DL (ref 70–110)
MICROSCOPIC COMMENT: NORMAL
PHOSPHATE SERPL-MCNC: 3.1 MG/DL (ref 2.7–4.5)
POTASSIUM SERPL-SCNC: 3.9 MMOL/L (ref 3.5–5.1)
PROT UR-MCNC: 15 MG/DL (ref 6–15)
PROT/CREAT UR: 0.15 MG/G{CREAT} (ref 0–0.2)
RBC #/AREA URNS HPF: 1 /HPF (ref 0–4)
SODIUM SERPL-SCNC: 140 MMOL/L (ref 136–145)
SQUAMOUS #/AREA URNS HPF: 0 /HPF
URATE SERPL-MCNC: 5.8 MG/DL (ref 2.4–5.7)
WBC #/AREA URNS HPF: 3 /HPF (ref 0–5)

## 2024-12-26 PROCEDURE — 82306 VITAMIN D 25 HYDROXY: CPT | Performed by: INTERNAL MEDICINE

## 2024-12-26 PROCEDURE — 36415 COLL VENOUS BLD VENIPUNCTURE: CPT | Performed by: INTERNAL MEDICINE

## 2024-12-26 PROCEDURE — 81001 URINALYSIS AUTO W/SCOPE: CPT | Performed by: INTERNAL MEDICINE

## 2024-12-26 PROCEDURE — 84156 ASSAY OF PROTEIN URINE: CPT | Performed by: INTERNAL MEDICINE

## 2024-12-26 PROCEDURE — 80069 RENAL FUNCTION PANEL: CPT | Performed by: INTERNAL MEDICINE

## 2024-12-26 PROCEDURE — 84550 ASSAY OF BLOOD/URIC ACID: CPT | Performed by: INTERNAL MEDICINE

## 2025-01-17 DIAGNOSIS — Z12.31 OTHER SCREENING MAMMOGRAM: Primary | ICD-10-CM

## 2025-01-29 ENCOUNTER — HOSPITAL ENCOUNTER (OUTPATIENT)
Dept: RADIOLOGY | Facility: HOSPITAL | Age: 80
Discharge: HOME | End: 2025-01-29
Attending: FAMILY MEDICINE
Payer: COMMERCIAL

## 2025-01-29 VITALS — WEIGHT: 147 LBS | HEIGHT: 63 IN | BODY MASS INDEX: 26.05 KG/M2

## 2025-01-29 DIAGNOSIS — Z12.31 OTHER SCREENING MAMMOGRAM: ICD-10-CM

## 2025-01-29 PROCEDURE — 77063 BREAST TOMOSYNTHESIS BI: CPT | Mod: 26,,, | Performed by: RADIOLOGY

## 2025-01-29 PROCEDURE — 77067 SCR MAMMO BI INCL CAD: CPT | Mod: 26,,, | Performed by: RADIOLOGY

## 2025-01-29 PROCEDURE — 77067 SCR MAMMO BI INCL CAD: CPT | Mod: TC,PO

## 2025-02-03 ENCOUNTER — TELEPHONE (OUTPATIENT)
Dept: FAMILY MEDICINE | Facility: CLINIC | Age: 80
End: 2025-02-03
Payer: COMMERCIAL

## 2025-02-03 NOTE — TELEPHONE ENCOUNTER
----- Message from Max Landeros MD sent at 2/2/2025  8:05 PM CST -----  Call patient.  Mammogram was normal.  Repeat mammogram in 1 year.

## 2025-03-05 ENCOUNTER — LAB VISIT (OUTPATIENT)
Dept: LAB | Facility: HOSPITAL | Age: 80
End: 2025-03-05
Attending: INTERNAL MEDICINE
Payer: COMMERCIAL

## 2025-03-05 DIAGNOSIS — E83.118 OTHER HEMOCHROMATOSIS: ICD-10-CM

## 2025-03-05 LAB
BASOPHILS # BLD AUTO: 0.06 K/UL (ref 0–0.2)
BASOPHILS NFR BLD: 1.1 % (ref 0–1.9)
DIFFERENTIAL METHOD BLD: NORMAL
EOSINOPHIL # BLD AUTO: 0.3 K/UL (ref 0–0.5)
EOSINOPHIL NFR BLD: 5.7 % (ref 0–8)
ERYTHROCYTE [DISTWIDTH] IN BLOOD BY AUTOMATED COUNT: 12.5 % (ref 11.5–14.5)
FERRITIN SERPL-MCNC: 71.6 NG/ML (ref 20–300)
HCT VFR BLD AUTO: 41.3 % (ref 37–48.5)
HGB BLD-MCNC: 13.5 G/DL (ref 12–16)
IMM GRANULOCYTES # BLD AUTO: 0.01 K/UL (ref 0–0.04)
IMM GRANULOCYTES NFR BLD AUTO: 0.2 % (ref 0–0.5)
LYMPHOCYTES # BLD AUTO: 1.5 K/UL (ref 1–4.8)
LYMPHOCYTES NFR BLD: 26.8 % (ref 18–48)
MCH RBC QN AUTO: 29.3 PG (ref 27–31)
MCHC RBC AUTO-ENTMCNC: 32.7 G/DL (ref 32–36)
MCV RBC AUTO: 90 FL (ref 82–98)
MONOCYTES # BLD AUTO: 0.4 K/UL (ref 0.3–1)
MONOCYTES NFR BLD: 7 % (ref 4–15)
NEUTROPHILS # BLD AUTO: 3.3 K/UL (ref 1.8–7.7)
NEUTROPHILS NFR BLD: 59.2 % (ref 38–73)
NRBC BLD-RTO: 0 /100 WBC
PLATELET # BLD AUTO: 243 K/UL (ref 150–450)
PMV BLD AUTO: 10.1 FL (ref 9.2–12.9)
RBC # BLD AUTO: 4.6 M/UL (ref 4–5.4)
WBC # BLD AUTO: 5.57 K/UL (ref 3.9–12.7)

## 2025-03-05 PROCEDURE — 36415 COLL VENOUS BLD VENIPUNCTURE: CPT | Performed by: INTERNAL MEDICINE

## 2025-03-05 PROCEDURE — 85025 COMPLETE CBC W/AUTO DIFF WBC: CPT | Performed by: INTERNAL MEDICINE

## 2025-03-05 PROCEDURE — 82728 ASSAY OF FERRITIN: CPT | Performed by: INTERNAL MEDICINE

## 2025-03-10 ENCOUNTER — OFFICE VISIT (OUTPATIENT)
Facility: CLINIC | Age: 80
End: 2025-03-10
Payer: COMMERCIAL

## 2025-03-10 VITALS
HEIGHT: 63 IN | OXYGEN SATURATION: 95 % | HEART RATE: 76 BPM | BODY MASS INDEX: 26.22 KG/M2 | WEIGHT: 148 LBS | TEMPERATURE: 98 F | DIASTOLIC BLOOD PRESSURE: 79 MMHG | RESPIRATION RATE: 16 BRPM | SYSTOLIC BLOOD PRESSURE: 141 MMHG

## 2025-03-10 DIAGNOSIS — E83.110 HEREDITARY HEMOCHROMATOSIS: Primary | ICD-10-CM

## 2025-03-10 PROCEDURE — 1101F PT FALLS ASSESS-DOCD LE1/YR: CPT | Mod: CPTII,S$GLB,, | Performed by: INTERNAL MEDICINE

## 2025-03-10 PROCEDURE — 3077F SYST BP >= 140 MM HG: CPT | Mod: CPTII,S$GLB,, | Performed by: INTERNAL MEDICINE

## 2025-03-10 PROCEDURE — 3288F FALL RISK ASSESSMENT DOCD: CPT | Mod: CPTII,S$GLB,, | Performed by: INTERNAL MEDICINE

## 2025-03-10 PROCEDURE — 1159F MED LIST DOCD IN RCRD: CPT | Mod: CPTII,S$GLB,, | Performed by: INTERNAL MEDICINE

## 2025-03-10 PROCEDURE — G2211 COMPLEX E/M VISIT ADD ON: HCPCS | Mod: S$GLB,,, | Performed by: INTERNAL MEDICINE

## 2025-03-10 PROCEDURE — 99215 OFFICE O/P EST HI 40 MIN: CPT | Mod: S$GLB,,, | Performed by: INTERNAL MEDICINE

## 2025-03-10 PROCEDURE — 1126F AMNT PAIN NOTED NONE PRSNT: CPT | Mod: CPTII,S$GLB,, | Performed by: INTERNAL MEDICINE

## 2025-03-10 PROCEDURE — 3078F DIAST BP <80 MM HG: CPT | Mod: CPTII,S$GLB,, | Performed by: INTERNAL MEDICINE

## 2025-03-10 PROCEDURE — 99999 PR PBB SHADOW E&M-EST. PATIENT-LVL IV: CPT | Mod: PBBFAC,,, | Performed by: INTERNAL MEDICINE

## 2025-03-11 NOTE — PROGRESS NOTES
[facility] [address] [facility] oncology Subsequent encounter note    3/10/25    Subjective:      Patient ID:   Payton Gutierrez  79 y.o. female  1945  [place]      Chief Complaint:   Iron disorder follow up    HPI:  79 y.o. female with diagnosis of increased iron stores, hemochromatosis treated with intermittent phlebotomy in the past.  Phlebotomy is on hold. Her last therapeutic phlebotomy   was 5 years  Ago.    Ferritin went from 132 to 206.  Hgb 13.6.  Resumed phlebotomy schedule, orders written.    Ferritin came down to  32, and has gradually increased to 87 over 6 months.  Hgb 13.2, observe for now.  Plan to resume phlebotomy at 200.  Recent ferritin stable at 58, Hgb 13.5.    Recent lab work shows that her hemoglobin is 13.2 and her ferritin is stable at 80 with observation.  She does not require therapeutic phlebotomy at this time.  She will get a CBC and ferritin done at [facility] in 6 months.    In May 20, 2024 she had diarrhea for 2 or 3 weeks.  She felt this was probably diverticulitis, she has had diverticulitis on several occasions before.  She had right lower quadrant abdominal pain.  She saw Dr. Jain.  CT scan of the abdomen and pelvis was negative for diverticulitis.  She tells me that she was found to have an E coli infection and treated per Dr. Jain.  GI symptoms have resolved.      Mammogram from January 20, 2024 was negative for malignancy.      She saw Dr. Beach of Cardiology earlier this month.  She does node where she had partial loss symptoms he is cardia radiation to include of the carotid arteries, I believe she is getting an echocardiogram, and is to have a monitoring device placed to evaluate her I suspect for atrial fibrillation or other arrhythmia.    I have asked her to begin aspirin 81 mg 1 p.o. daily as prophylaxis to reduce her risk of recurrent eye symptoms or TIA.  I have informed her that if she develops I symptoms again or focal neurological symptoms again, that she is  not to wait around at home but is to go to the emergency room promptly so that she can be evaluated for tPA administration to revolve her symptoms and prevent permanent neurological damage or visual loss?    No further TIA sx on ASA 81 mg daily.  Bubble Echo did not show shunt, Ej Fx 70%.  Hgb 13.5 and Ferritin 80 to 72.  Observe for now.  CBC Ferritin q 6 months and in 12 months.  RTC 6 months, can cancel.  RTC 1 year.    She has not had covid 19 infection.  She did take 2/2 vaccines.  She is undecided if she will take the booster vaccine.    Had recent episode of diverticulitis, being evaluated for surgery is sx recur.  R hip pain sx, DDD.    She has history of GERD, glaucoma, hypercholesterolemia,hypertension, and has been diagnosed with Parkinson's disease.  She sees Dr. Spencer.  He tells me her Parkinson's symptoms have resolved, and she is off Parkinson medications.  Parkinson's Dx ?    She is status post tonsillectomy, appendectomy, complete hysterectomy.    She is a retired , she is allergic to codeine. She does not smoke, she does not drink alcohol with regularity.    Her dad passed away of a motor vehicle accident and her mother  of vaginal cancer. She has a brother with the hemochromatosis gene and increased ferritin on intermittent phlebotomy.    She denies gallbladder disease jaundice or hepatitis.  She is on lithium.  She is seeing Dr. Watts for renal dx.  She recently had diverticulitis.    2nd cousin Adry STARKS.    ROS:   GEN: normal without any fever, night sweats or weight loss  HEENT: normal with no HA's, sore throat, stiff neck, changes in vision  CV: normal with no CP, SOB, PND, SAUNDERS or orthopnea  PULM: normal with no SOB, cough, hemoptysis, sputum or pleuritic pain  GI: normal with no abdominal pain, nausea, vomiting, constipation, diarrhea, melanotic stools, BRBPR, or hematemesis  : normal with no hematuria, dysuria  BREAST: normal with no mass, discharge, pain  SKIN: normal  "with no rash, erythema, bruising, or swelling     Past Medical History:   Diagnosis Date    Glaucoma     Hyperlipidemia     Hypertension      Past Surgical History:   Procedure Laterality Date    CATARACT EXTRACTION  2016    HYSTERECTOMY         Review of patient's allergies indicates:   Allergen Reactions    Codeine     Sulfa (sulfonamide antibiotics)            Current Outpatient Medications:     amLODIPine (NORVASC) 5 MG tablet, Take 1 tablet (5 mg total) by mouth 2 (two) times daily., Disp: 180 tablet, Rfl: 1    aspirin 81 MG Chew, Take 81 mg by mouth once daily., Disp: , Rfl:     ergocalciferol, vitamin D2, (VITAMIN D ORAL), Take 1 tablet by mouth once daily., Disp: , Rfl:     lisinopriL (PRINIVIL,ZESTRIL) 20 MG tablet, Take 1 tablet (20 mg total) by mouth once daily., Disp: 90 tablet, Rfl: 3    LUMIGAN 0.01 % Drop, Place 1 drop into both eyes every evening., Disp: , Rfl:     omeprazole (PRILOSEC) 20 MG capsule, Take 1 capsule (20 mg total) by mouth once daily., Disp: 90 capsule, Rfl: 3    rosuvastatin (CRESTOR) 10 MG tablet, Take 1 tablet (10 mg total) by mouth once daily., Disp: 90 tablet, Rfl: 3    diclofenac sodium (VOLTAREN) 1 % Gel, Apply 2 g topically once daily. for 10 days, Disp: 450 g, Rfl: 0    lithium carbonate 150 MG capsule, Take 1 capsule (150 mg total) by mouth once daily., Disp: 90 capsule, Rfl: 3          Objective:   Vitals:  Blood pressure (!) 141/79, pulse 76, temperature 97.7 °F (36.5 °C), temperature source Temporal, resp. rate 16, height 5' 2.5" (1.588 m), weight 67.1 kg (148 lb), SpO2 95%.    Physical Examination:   GEN: no apparent distress, comfortable  HEAD: atraumatic and normocephalic  EYES: no pallor, no icterus  ENT: OMM, no pharyngeal erythema, external ears WNL; no nasal discharge; no thrush  NECK: no masses, thyroid normal, trachea midline, no LAD/LN's, supple  CV: RRR with no murmur; normal pulse; normal S1 and S2; no pedal edema  CHEST: Normal respiratory effort; CTAB; " normal breath sounds; no wheeze or crackles  ABDOM: nontender and nondistended; soft;  no rebound/guarding, the liver and spleen are not palpable  MUSC/Skeletal: ROM normal; no crepitus; joints normal  EXTREM: no clubbing, cyanosis, inflammation or swelling  SKIN: no rashes, lesions, ulcers, petechia  : no cvat  NEURO: grossly intact; motor/sensory WNL;  no tremors  PSYCH: normal mood, affect and behavior  LYMPH: normal cervical, supraclavicular, axillary and groin LN's  BREASTS:she left the bra on, I did not examine the breasts      Dr. Jain did colonoscopy on August 17, 2017, diverticular disease were found, biopsy was negative for colitis.  Hgb 13.5, ferritin 58 now.    Mamm 1/22/24 negative.    Assessment:   (1) 79 y.o. female with diagnosis of increased iron stores, or hemochromatosis. She is status post intermittent phlebotomy in the past. Ferritin level is now 80. Observe for now.  Plan to resume phlebotomy at ferritin of 200.    (2)other history includes hypertension, hypercholesterolemia, she will follow-up with Dr. Landeros for primary care.     (3)She has recently been diagnosed with Parkinson's disease? and Dr. Chapa for renal.    (4) recent partial visual loss x2 hours.  Suspect embolic phenomena affecting her vision.  Dr. Beach is doing cardiac evaluation to include ultrasound of the carotids, echocardiogram, monitoring studies for arrhythmia.    I have asked her to begin aspirin 81 mg 1 p.o. daily as prophylaxis to reduce the risk of recurrent embolic TIA, CVA, or visual loss symptoms.    Hgb 13.5, Ferritin 80 to 72.  Observe for now.  CBC ferritin in 6 and in 12 months.  RTC 6 months, can cancel, and in 12 months.    Derick Monroe MD  [facility]  3/10/25

## 2025-06-02 DIAGNOSIS — E78.00 HIGH CHOLESTEROL: ICD-10-CM

## 2025-06-02 DIAGNOSIS — K21.9 GASTROESOPHAGEAL REFLUX DISEASE WITHOUT ESOPHAGITIS: ICD-10-CM

## 2025-06-02 RX ORDER — ROSUVASTATIN CALCIUM 10 MG/1
10 TABLET, COATED ORAL DAILY
Qty: 90 TABLET | Refills: 3 | Status: SHIPPED | OUTPATIENT
Start: 2025-06-02

## 2025-06-02 RX ORDER — OMEPRAZOLE 20 MG/1
20 CAPSULE, DELAYED RELEASE ORAL DAILY
Qty: 90 CAPSULE | Refills: 3 | Status: SHIPPED | OUTPATIENT
Start: 2025-06-02

## 2025-06-03 ENCOUNTER — TELEPHONE (OUTPATIENT)
Dept: FAMILY MEDICINE | Facility: CLINIC | Age: 80
End: 2025-06-03
Payer: COMMERCIAL

## 2025-06-13 ENCOUNTER — PATIENT OUTREACH (OUTPATIENT)
Dept: ADMINISTRATIVE | Facility: HOSPITAL | Age: 80
End: 2025-06-13
Payer: COMMERCIAL

## 2025-06-13 NOTE — PROGRESS NOTES
Population Health Chart Review & Patient Outreach Details      Additional [facility] Notes:               Updates Requested / Reviewed:      Updated Care Coordination Note, Care Everywhere, , and Immunizations Reconciliation Completed or Queried: [facility] Topics Overdue:      [facility] Score: 1     Osteoporosis Screening  Uncontrolled BP    Tetanus Vaccine  RSV Vaccine                  Health Maintenance Topic(s) Outreach Outcomes & Actions Taken:    Blood Pressure - Outreach Outcomes & Actions Taken  : Primary Care Follow Up Visit Scheduled

## 2025-06-18 ENCOUNTER — OFFICE VISIT (OUTPATIENT)
Dept: FAMILY MEDICINE | Facility: CLINIC | Age: 80
End: 2025-06-18
Payer: COMMERCIAL

## 2025-06-18 DIAGNOSIS — E78.2 MIXED HYPERLIPIDEMIA: ICD-10-CM

## 2025-06-18 DIAGNOSIS — I10 ESSENTIAL HYPERTENSION: ICD-10-CM

## 2025-06-18 DIAGNOSIS — G45.9 TIA (TRANSIENT ISCHEMIC ATTACK): ICD-10-CM

## 2025-06-18 DIAGNOSIS — D69.2 PURPURA SENILIS: ICD-10-CM

## 2025-06-18 DIAGNOSIS — F31.76 BIPOLAR DISORDER, IN FULL REMISSION, MOST RECENT EPISODE DEPRESSED: Primary | ICD-10-CM

## 2025-06-18 DIAGNOSIS — H90.0 CONDUCTIVE HEARING LOSS, BILATERAL: ICD-10-CM

## 2025-06-18 DIAGNOSIS — K21.9 GASTROESOPHAGEAL REFLUX DISEASE WITHOUT ESOPHAGITIS: ICD-10-CM

## 2025-06-18 DIAGNOSIS — G20.A1 PARKINSON'S DISEASE WITHOUT DYSKINESIA OR FLUCTUATING MANIFESTATIONS: ICD-10-CM

## 2025-06-18 DIAGNOSIS — R10.31 RIGHT LOWER QUADRANT ABDOMINAL PAIN: ICD-10-CM

## 2025-06-18 DIAGNOSIS — M51.9 LUMBAR DISC DISEASE: ICD-10-CM

## 2025-06-18 DIAGNOSIS — Z78.0 MENOPAUSE: ICD-10-CM

## 2025-06-18 DIAGNOSIS — N18.31 STAGE 3A CHRONIC KIDNEY DISEASE: ICD-10-CM

## 2025-06-18 PROCEDURE — 1101F PT FALLS ASSESS-DOCD LE1/YR: CPT | Mod: CPTII,S$GLB,, | Performed by: FAMILY MEDICINE

## 2025-06-18 PROCEDURE — 3077F SYST BP >= 140 MM HG: CPT | Mod: CPTII,S$GLB,, | Performed by: FAMILY MEDICINE

## 2025-06-18 PROCEDURE — 3288F FALL RISK ASSESSMENT DOCD: CPT | Mod: CPTII,S$GLB,, | Performed by: FAMILY MEDICINE

## 2025-06-18 PROCEDURE — 3078F DIAST BP <80 MM HG: CPT | Mod: CPTII,S$GLB,, | Performed by: FAMILY MEDICINE

## 2025-06-18 PROCEDURE — 99214 OFFICE O/P EST MOD 30 MIN: CPT | Mod: S$GLB,,, | Performed by: FAMILY MEDICINE

## 2025-06-18 PROCEDURE — 1159F MED LIST DOCD IN RCRD: CPT | Mod: CPTII,S$GLB,, | Performed by: FAMILY MEDICINE

## 2025-06-18 RX ORDER — AMLODIPINE BESYLATE 5 MG/1
5 TABLET ORAL DAILY
Qty: 90 TABLET | Refills: 1 | Status: SHIPPED | OUTPATIENT
Start: 2025-06-18

## 2025-06-18 RX ORDER — LISINOPRIL 20 MG/1
20 TABLET ORAL DAILY
Qty: 90 TABLET | Refills: 3 | Status: SHIPPED | OUTPATIENT
Start: 2025-06-18

## 2025-06-18 RX ORDER — LITHIUM CARBONATE 150 MG/1
150 CAPSULE ORAL DAILY
Qty: 90 CAPSULE | Refills: 3 | Status: CANCELLED | OUTPATIENT
Start: 2025-06-18 | End: 2025-07-18

## 2025-06-21 VITALS
SYSTOLIC BLOOD PRESSURE: 142 MMHG | WEIGHT: 144.63 LBS | HEART RATE: 73 BPM | OXYGEN SATURATION: 96 % | BODY MASS INDEX: 25.62 KG/M2 | DIASTOLIC BLOOD PRESSURE: 60 MMHG | HEIGHT: 63 IN

## 2025-06-21 PROBLEM — E78.2 MIXED HYPERLIPIDEMIA: Status: ACTIVE | Noted: 2017-09-11

## 2025-06-21 NOTE — PROGRESS NOTES
SUBJECTIVE:    Patient ID: Payton Gutierrez is a 79 y.o. female.    Chief Complaint: Follow-up (6 mo follow up/ lab work in Epic/ declines DEXA scan / sore throat x 4-5 days, has reflux unsure if this is the cause, no sinus pressure, no headache, no ear ache, no fever, no body aches, no chills, no n/v/d / occasional right side abdominal pain //mp)    Follow-up  Pertinent negatives include no abdominal pain, chest pain, chills, coughing, fatigue, fever, joint swelling, myalgias, nausea, numbness or vomiting.       History of Present Illness    CHIEF COMPLAINT:  Payton presents today with a sore throat that started about five days ago.    HISTORY OF PRESENT ILLNESS:  She reports sore throat that started approximately 5 days ago with occasional associated coughing. She wonders if symptoms might be related to reflux. She denies recent cold or sinus problems, associated heartburn, or GI symptoms.    GERD:  She manages reflux symptoms through lifestyle modifications including an adjustable bed with elevated head positioning and sleeping primarily on her left side, though occasionally sleeps on her right side. She acquired a new mattress approximately 3 weeks ago and expresses uncertainty about whether her current bed elevation is optimal. She denies current heartburn or throat irritation. She previously experienced acid reflux but states this has resolved. She takes omeprazole in the morning for acid reflux, recently ran out and missed one day, but refilled prescription approximately 2 weeks ago.    GASTROINTESTINAL:  She describes abdominal pain for the last year, occurring primarily at night around 5:30 AM. Pain is associated with bowel movements and improves after defecation around 7:30 AM. Two abdominal CTs were performed with the most recent in November showing normal findings. She believes pain is related to bowel movements. She denies pain when swallowing but reports occasional choking on food frequently followed  "by sneezing. She has regular bowel movements and is not constipated. She has loose stools with certain foods and experiences diarrhea specifically after eating seasoning at [employer] restaurant. She remains cautious about food triggers and uses only salt for seasoning.    POSSIBLE TIA:  She experienced a possible transient ischemic attack with visual disturbance where the television appeared "broken up", uncertain whether the issue was with the TV or her vision. Symptoms lasted for a few hours. She denies any additional neurological symptoms before or after this episode.    CARDIAC TESTING:  Comprehensive cardiac testing was completed due to concern for potential TIA. All cardiac tests were normal and she reports her heart "checked out good".    MUSCULOSKELETAL:  She has a history of knee arthritis with previously intermittent but intense pain when present. She was initially less active due to knee discomfort but was advised to continue movement. Walking was initially painful but she persisted through discomfort. She currently reports no ongoing knee pain and has resumed walking. She experiences stiffness when sitting for prolonged periods that improves with movement and physical activity. She reports easy bruising with minimal trauma, noting that even minor interventions like blood draws can result in bruising.    EXERCISE:  She resumed walking in mid-April after previously stopping due to knee issues. She currently walks 4 days per week for 30 minutes in her neighborhood. She is cautious about not overdoing activity to prevent knee pain recurrence and acknowledges walking   less far and less fast compared to previously.    SLEEP:  She has long-standing poor sleep patterns with fragmented sleep, typically sleeping in 3-hour intervals interrupted by bathroom visits. Her routine consists of approximately 3 hours of sleep, waking up, returning to sleep for another 3 hours, and potentially getting an additional 2 " hours. When first starting lithium, her sleep was even more disrupted with only 2-3 hours of total sleep.    APPETITE:  She eats two meals daily and describes her appetite as consistent.    SINUS:  She reports occasional mild nasal rhinorrhea but denies significant sinus drainage or postnasal drip.    CURRENT MEDICATIONS:  Omeprazole in the morning for acid reflux, Losartan in the morning for blood pressure, amlodipine daily at night for blood pressure (reduced from twice daily due to ankle swelling), lisinopril for blood pressure, rosuvastatin 20 mg daily for cholesterol, baby aspirin daily initiated due to possible TIA, and Advil intermittently for knee pain. She self-weaned off lithium over 8 weeks starting April 21st, gradually reducing dosage from 5 mg to 2 mg over 2-week intervals. The lithium was for mood stability and she reports no high periods or mood instability with daily self-monitoring of mood symptoms.    ROS:  Constitutional: -appetite change, -chills, -fatigue, -fever, -unexpected weight change  HENT: -ear pain, +trouble swallowing, +sore throat, +choking sensation, +frequent sneezing, +runny nose, +nasal discharge  Eyes: -pain, -discharge, -visual disturbance  Respiratory: -apnea, +cough, -shortness of breath, -wheezing  Cardiovascular: -chest pain, -leg swelling  Gastrointestinal: +abdominal pain, -blood in stool, -constipation, +diarrhea, -nausea, -vomiting, -reflux, +pain with defecation  Endocrine: -cold intolerance, -heat intolerance, -polydipsia  Genitourinary: -bladder incontinence, -dysuria, -erectile dysfunction, -frequency, -hematuria, -testicular pain, -urgency, +nocturia  Musculoskeletal: -gait problem, -joint swelling, -myalgia, +joint stiffness, +muscle stiffness  Neurological: -dizziness, -seizures, -numbness, +difficulty staying asleep  Psychiatric/Behavioral: -agitation, -hallucinations, -nervous, -anxiety symptoms         Lab Visit on 03/05/2025   Component Date Value Ref Range  Status    Ferritin 03/05/2025 71.6  20.0 - 300.0 ng/mL Final    WBC 03/05/2025 5.57  3.90 - 12.70 K/uL Final    RBC 03/05/2025 4.60  4.00 - 5.40 M/uL Final    Hemoglobin 03/05/2025 13.5  12.0 - 16.0 g/dL Final    Hematocrit 03/05/2025 41.3  37.0 - 48.5 % Final    MCV 03/05/2025 90  82 - 98 fL Final    MCH 03/05/2025 29.3  27.0 - 31.0 pg Final    MCHC 03/05/2025 32.7  32.0 - 36.0 g/dL Final    RDW 03/05/2025 12.5  11.5 - 14.5 % Final    Platelets 03/05/2025 243  150 - 450 K/uL Final    MPV 03/05/2025 10.1  9.2 - 12.9 fL Final    Immature Granulocytes 03/05/2025 0.2  0.0 - 0.5 % Final    Gran # (ANC) 03/05/2025 3.3  1.8 - 7.7 K/uL Final    Immature Grans (Abs) 03/05/2025 0.01  0.00 - 0.04 K/uL Final    Lymph # 03/05/2025 1.5  1.0 - 4.8 K/uL Final    Mono # 03/05/2025 0.4  0.3 - 1.0 K/uL Final    Eos # 03/05/2025 0.3  0.0 - 0.5 K/uL Final    Baso # 03/05/2025 0.06  0.00 - 0.20 K/uL Final    nRBC 03/05/2025 0  0 /100 WBC Final    Gran % 03/05/2025 59.2  38.0 - 73.0 % Final    Lymph % 03/05/2025 26.8  18.0 - 48.0 % Final    Mono % 03/05/2025 7.0  4.0 - 15.0 % Final    Eosinophil % 03/05/2025 5.7  0.0 - 8.0 % Final    Basophil % 03/05/2025 1.1  0.0 - 1.9 % Final    Differential Method 03/05/2025 Automated   Final   Lab Visit on 12/26/2024   Component Date Value Ref Range Status    Uric Acid 12/26/2024 5.8 (H)  2.4 - 5.7 mg/dL Final    Glucose 12/26/2024 96  70 - 110 mg/dL Final    Sodium 12/26/2024 140  136 - 145 mmol/L Final    Potassium 12/26/2024 3.9  3.5 - 5.1 mmol/L Final    Chloride 12/26/2024 107  95 - 110 mmol/L Final    CO2 12/26/2024 26  23 - 29 mmol/L Final    BUN 12/26/2024 20  8 - 23 mg/dL Final    Calcium 12/26/2024 9.9  8.7 - 10.5 mg/dL Final    Creatinine 12/26/2024 1.1  0.5 - 1.4 mg/dL Final    Albumin 12/26/2024 4.3  3.5 - 5.2 g/dL Final    Phosphorus 12/26/2024 3.1  2.7 - 4.5 mg/dL Final    eGFR 12/26/2024 50.5 (A)  >60 mL/min/1.73 m^2 Final    Anion Gap 12/26/2024 7 (L)  8 - 16 mmol/L Final    Vit D,  25-Hydroxy 12/26/2024 68  30 - 96 ng/mL Final    Protein, Urine Random 12/26/2024 15  6 - 15 mg/dL Final    Creatinine, Urine 12/26/2024 100.3  15.0 - 325.0 mg/dL Final    Prot/Creat Ratio, Urine 12/26/2024 0.15  0.00 - 0.20 Final    RBC, UA 12/26/2024 1  0 - 4 /hpf Final    WBC, UA 12/26/2024 3  0 - 5 /hpf Final    Squam Epithel, UA 12/26/2024 0  /hpf Final    Microscopic Comment 12/26/2024 SEE COMMENT   Final       Past Medical History:   Diagnosis Date    Glaucoma     Hyperlipidemia     Hypertension      Social History[1]  Past Surgical History:   Procedure Laterality Date    CATARACT EXTRACTION  2016    HYSTERECTOMY  1988     Family History   Problem Relation Name Age of Onset    Cancer Mother         The CVD Risk score (Tawana, et al., 2008) failed to calculate for the following reasons:    The 2008 CVD risk score is only valid for ages 30 to 74    The patient has a prior MI, stroke, CHF, or peripheral vascular disease diagnosis    Tests to Keep You Healthy    Mammogram: Met on 1/29/2025  Last Blood Pressure <= 139/89 (6/18/2025): NO      Review of patient's allergies indicates:   Allergen Reactions    Codeine      Other reaction(s): Not available    Sulfa (sulfonamide antibiotics)      Other reaction(s): Not available    Sulfur Other (See Comments)     Current Medications[2]    Review of Systems   Constitutional:  Negative for appetite change, chills, fatigue, fever and unexpected weight change.   HENT:  Negative for ear discharge and ear pain.    Eyes:  Negative for pain, discharge and visual disturbance.   Respiratory:  Negative for apnea, cough, shortness of breath and wheezing.    Cardiovascular:  Negative for chest pain, palpitations and leg swelling.   Gastrointestinal:  Negative for abdominal pain, blood in stool, constipation, diarrhea, nausea, vomiting and reflux.   Endocrine: Negative for cold intolerance, heat intolerance and polydipsia.   Genitourinary:  Negative for bladder incontinence,  "dysuria, hematuria, nocturia and urgency.   Musculoskeletal:  Negative for gait problem, joint swelling and myalgias.   Neurological:  Negative for dizziness, seizures and numbness.   Psychiatric/Behavioral:  Negative for behavioral problems and hallucinations. The patient is not nervous/anxious.            Objective:      Vitals:    06/18/25 1043 06/18/25 1056   BP: (!) 146/62 (!) 150/58   Pulse: 73    SpO2: 96%    Weight: 65.6 kg (144 lb 9.6 oz)    Height: 5' 2.5" (1.588 m)      Physical Exam  Vitals and nursing note reviewed.   Constitutional:       General: She is not in acute distress.     Appearance: Normal appearance. She is well-developed. She is not toxic-appearing.   HENT:      Head: Normocephalic and atraumatic.      Right Ear: Tympanic membrane and external ear normal.      Left Ear: Tympanic membrane and external ear normal.      Nose: Nose normal.      Mouth/Throat:      Pharynx: Oropharynx is clear. No posterior oropharyngeal erythema.   Eyes:      Pupils: Pupils are equal, round, and reactive to light.   Neck:      Thyroid: No thyromegaly.      Vascular: No carotid bruit.   Cardiovascular:      Rate and Rhythm: Normal rate and regular rhythm.      Heart sounds: Normal heart sounds. No murmur heard.  Pulmonary:      Effort: Pulmonary effort is normal.      Breath sounds: Normal breath sounds. No wheezing or rales.   Abdominal:      General: Bowel sounds are normal. There is no distension.      Palpations: Abdomen is soft.      Tenderness: There is no abdominal tenderness.   Musculoskeletal:         General: No tenderness or deformity. Normal range of motion.      Cervical back: Normal range of motion and neck supple.      Lumbar back: Normal. No spasms.      Comments: Bends 90 degrees at  waist, shoulders and knees have full range of motion, no pitting edema to lower extremities   Lymphadenopathy:      Cervical: No cervical adenopathy.   Skin:     General: Skin is warm and dry.      Findings: No rash. "   Neurological:      General: No focal deficit present.      Mental Status: She is alert and oriented to person, place, and time. Mental status is at baseline.      Cranial Nerves: No cranial nerve deficit.      Coordination: Coordination normal.   Psychiatric:         Mood and Affect: Mood normal.         Behavior: Behavior normal.         Thought Content: Thought content normal.         Judgment: Judgment normal.       Physical Exam    HENT: Tympanic membranes normal bilaterally. External ears normal bilaterally. Oropharynx is clear. No posterior oropharyngeal erythema.  Neck: No carotid bruit.  Cardiovascular: Normal rate and regular rhythm. Normal heart sounds. No murmur heard.  Musculoskeletal: Crepitus in knee.             Assessment:       1. Bipolar disorder, in full remission, most recent episode depressed    2. Menopause    3. Essential hypertension    4. Lumbar disc disease    5. Parkinson's disease without dyskinesia or fluctuating manifestations    6. TIA (transient ischemic attack)    7. Conductive hearing loss, bilateral    8. Mixed hyperlipidemia    9. Stage 3a chronic kidney disease    10. Purpura senilis    11. Right lower quadrant abdominal pain    12. Gastroesophageal reflux disease without esophagitis         Plan:       Bipolar disorder, in full remission, most recent episode depressed  Patient discontinuing her lithium and seems to be doing well.  We will continue to monitor  Menopause  No hot flashes lately  Essential hypertension  -     amLODIPine (NORVASC) 5 MG tablet; Take 1 tablet (5 mg total) by mouth once daily.  Dispense: 90 tablet; Refill: 1  -     lisinopriL (PRINIVIL,ZESTRIL) 20 MG tablet; Take 1 tablet (20 mg total) by mouth once daily.  Dispense: 90 tablet; Refill: 3  Blood pressure slight elevation, home blood pressure diary for 2 weeks and then return for nurse blood pressure visit 2 weeks  Lumbar disc disease    Parkinson's disease without dyskinesia or fluctuating  manifestations  No tremors or cogwheeling today  TIA (transient ischemic attack)    Conductive hearing loss, bilateral    Mixed hyperlipidemia  Cholesterol 170 HDL 59  LDL 87 continue rosuvastatin  Stage 3a chronic kidney disease    Purpura senilis    Right lower quadrant abdominal pain  No Source of the abdominal pain found on recent CAT scan  Gastroesophageal reflux disease without esophagitis  Increase omeprazole 20 mg b.i.d.    Assessment & Plan    I10 Essential (primary) hypertension  K21.9 Gastro-esophageal reflux disease without esophagitis  E78.5 Hyperlipidemia, unspecified  G45.3 Amaurosis fugax  M17.9 Osteoarthritis of knee, unspecified  G47.00 Insomnia, unspecified  R10.30 Lower abdominal pain, unspecified  R23.3 Spontaneous ecchymoses  Z86.59 Personal history of other mental and behavioral disorders    HYPERTENSION:  - Blood pressure measured at 146, indicating hypertension.  - Continue current antihypertensive regimen including morning Losartan, morning lisinopril, and evening amlodipine.    GASTROESOPHAGEAL REFLUX DISEASE (GERD):  - Payton denies heartburn and reports no reflux symptoms in a long time, though still has sore throat and cough.  - Continue omeprazole to be taken first thing in the morning.  - Payton uses an adjustable bed to elevate head.  - Advised that sleeping position (left or right side) should not significantly impact reflux symptoms as long as the head of the bed remains elevated.    HYPERLIPIDEMIA:  - Continue rosuvastatin for cholesterol management.    HISTORY OF TRANSIENT ISCHEMIC ATTACK (TIA):  - Continue daily baby aspirin as a preventive measure due to uncertain history of TIA.  - Discussed that baby aspirin is unlikely to cause throat irritation, though may contribute to easy bruising noted elsewhere.    KNEE OSTEOARTHRITIS:  - Payton reports improvement in knee pain with exercise and OTC ibuprofen as needed.  - Encouraged to continue walking 4 days a week for 30  minutes to manage severe knee arthritis.    INSOMNIA:  - Payton reports fragmented sleep, waking every 3 hours during the night, possibly due to bathroom needs.  - No concerning manic symptoms observed during sleep pattern evaluation.    ABDOMINAL PAIN:  - Abdominal pain related to bowel movements has shown improvement after bowel movements.    EASY BRUISING:  - Payton continues to bruise easily, possibly related to baby aspirin use.    HISTORY OF MENTAL HEALTH DISORDERS:  - Payton self-discontinued lithium over 8 weeks ago.  - No current signs of mood instability or depression observed.    OTHER:  - Assessed sore throat that started 5 days ago.  - Throat exam ruled out strep throat.         Follow up in about 2 weeks (around 7/2/2025), or 6 mo htn Dr Bowman, for BP Check-Up with Nurse.        This note was generated with the assistance of ambient listening technology. Verbal consent was obtained by the patient and accompanying visitor(s) for the recording of patient appointment to facilitate this note. I attest to having reviewed and edited the generated note for accuracy, though some syntax or spelling errors may persist. Please contact the author of this note for any clarification.      6/21/2025 Max Landeros           [1]   Social History  Socioeconomic History    Marital status: Single   Tobacco Use    Smoking status: Never    Smokeless tobacco: Never   Substance and Sexual Activity    Alcohol use: No    Drug use: No   [2]   Current Outpatient Medications:     aspirin 81 MG Chew, Take 81 mg by mouth once daily., Disp: , Rfl:     ergocalciferol, vitamin D2, (VITAMIN D ORAL), Take 1 tablet by mouth once daily., Disp: , Rfl:     latanoprost 0.005 % ophthalmic solution, Place 1 drop into both eyes every evening., Disp: , Rfl:     lithium carbonate 150 MG capsule, Take 1 capsule (150 mg total) by mouth once daily., Disp: 90 capsule, Rfl: 3    omeprazole (PRILOSEC) 20 MG capsule, Take 1 capsule (20 mg total) by  mouth once daily., Disp: 90 capsule, Rfl: 3    rosuvastatin (CRESTOR) 10 MG tablet, Take 1 tablet (10 mg total) by mouth once daily., Disp: 90 tablet, Rfl: 3    amLODIPine (NORVASC) 5 MG tablet, Take 1 tablet (5 mg total) by mouth once daily., Disp: 90 tablet, Rfl: 1    diclofenac sodium (VOLTAREN) 1 % Gel, Apply 2 g topically once daily. for 10 days (Patient not taking: Reported on 6/18/2025), Disp: 450 g, Rfl: 0    lisinopriL (PRINIVIL,ZESTRIL) 20 MG tablet, Take 1 tablet (20 mg total) by mouth once daily., Disp: 90 tablet, Rfl: 3    LUMIGAN 0.01 % Drop, Place 1 drop into both eyes every evening. (Patient not taking: Reported on 6/18/2025), Disp: , Rfl:

## 2025-06-24 RX ORDER — RABEPRAZOLE SODIUM 20 MG/1
20 TABLET, DELAYED RELEASE ORAL DAILY
Qty: 30 TABLET | Refills: 2 | Status: SHIPPED | OUTPATIENT
Start: 2025-06-24 | End: 2026-06-24

## 2025-06-24 NOTE — TELEPHONE ENCOUNTER
Spoke to pt and she stated when she saw Dr. Landeros he told her for her acid reflux to take two 20 mg Omeprazole and see if that helps. Pt stated it is not helping and she would like to try something different

## 2025-06-24 NOTE — TELEPHONE ENCOUNTER
----- Message from Dotty sent at 6/24/2025  3:38 PM CDT -----  - 3:34- pt would like something else besides pantoprazole. It is not working   [phone]

## 2025-06-30 ENCOUNTER — CLINICAL SUPPORT (OUTPATIENT)
Dept: FAMILY MEDICINE | Facility: CLINIC | Age: 80
End: 2025-06-30
Payer: COMMERCIAL

## 2025-06-30 VITALS — SYSTOLIC BLOOD PRESSURE: 128 MMHG | DIASTOLIC BLOOD PRESSURE: 60 MMHG

## 2025-06-30 DIAGNOSIS — I10 ESSENTIAL HYPERTENSION: Primary | ICD-10-CM

## 2025-06-30 NOTE — PROGRESS NOTES
Presents to clinic with home BP logs and BP check. At last visit with Dr. Landeros slight elevation in readings. Manual readings in clinic 128/60, 130/58  Instructed to continue to monitor at home and notify of any readings out of range.

## 2025-09-02 ENCOUNTER — LAB VISIT (OUTPATIENT)
Dept: LAB | Facility: HOSPITAL | Age: 80
End: 2025-09-02
Attending: INTERNAL MEDICINE
Payer: COMMERCIAL

## 2025-09-02 DIAGNOSIS — E83.110 HEREDITARY HEMOCHROMATOSIS: ICD-10-CM

## 2025-09-02 LAB
ABSOLUTE EOSINOPHIL (SMH): 0.19 K/UL
ABSOLUTE MONOCYTE (SMH): 0.47 K/UL (ref 0.3–1)
ABSOLUTE NEUTROPHIL COUNT (SMH): 2.7 K/UL (ref 1.8–7.7)
BASOPHILS # BLD AUTO: 0.04 K/UL
BASOPHILS NFR BLD AUTO: 0.8 %
ERYTHROCYTE [DISTWIDTH] IN BLOOD BY AUTOMATED COUNT: 13.3 % (ref 11.5–14.5)
HCT VFR BLD AUTO: 40.7 % (ref 37–48.5)
HGB BLD-MCNC: 13.5 GM/DL (ref 12–16)
IMM GRANULOCYTES # BLD AUTO: 0.01 K/UL (ref 0–0.04)
IMM GRANULOCYTES NFR BLD AUTO: 0.2 % (ref 0–0.5)
LYMPHOCYTES # BLD AUTO: 1.47 K/UL (ref 1–4.8)
MCH RBC QN AUTO: 30.2 PG (ref 27–31)
MCHC RBC AUTO-ENTMCNC: 33.2 G/DL (ref 32–36)
MCV RBC AUTO: 91 FL (ref 82–98)
NUCLEATED RBC (/100WBC) (SMH): 0 /100 WBC
PLATELET # BLD AUTO: 240 K/UL (ref 150–450)
PMV BLD AUTO: 10.1 FL (ref 9.2–12.9)
RBC # BLD AUTO: 4.47 M/UL (ref 4–5.4)
RELATIVE EOSINOPHIL (SMH): 3.9 % (ref 0–8)
RELATIVE LYMPHOCYTE (SMH): 30.3 % (ref 18–48)
RELATIVE MONOCYTE (SMH): 9.7 % (ref 4–15)
RELATIVE NEUTROPHIL (SMH): 55.1 % (ref 38–73)
WBC # BLD AUTO: 4.85 K/UL (ref 3.9–12.7)

## 2025-09-02 PROCEDURE — 36415 COLL VENOUS BLD VENIPUNCTURE: CPT | Mod: PO

## 2025-09-02 PROCEDURE — 85025 COMPLETE CBC W/AUTO DIFF WBC: CPT

## 2025-09-03 ENCOUNTER — TELEPHONE (OUTPATIENT)
Facility: CLINIC | Age: 80
End: 2025-09-03
Payer: COMMERCIAL

## 2025-09-03 DIAGNOSIS — E83.110 HEREDITARY HEMOCHROMATOSIS: Primary | ICD-10-CM

## 2025-09-03 DIAGNOSIS — R79.89 ELEVATED FERRITIN: ICD-10-CM
